# Patient Record
Sex: FEMALE | Race: WHITE | NOT HISPANIC OR LATINO | Employment: PART TIME | URBAN - METROPOLITAN AREA
[De-identification: names, ages, dates, MRNs, and addresses within clinical notes are randomized per-mention and may not be internally consistent; named-entity substitution may affect disease eponyms.]

---

## 2018-01-18 NOTE — PROGRESS NOTES
Assessment    1  Fracture, tooth (323 91) (S02  5XXA)    Plan  Fracture, tooth    · Amoxicillin 875 MG Oral Tablet; TAKE 1 TABLET EVERY 12 HOURS DAILY   · Oral Surgery Follow Up Evaluation and Treatment  Follow-up  Status: Hold For -  Scheduling  Requested for: 09Apr2016    Discussion/Summary  Discussion Summary:   Patient has broken crown leading to overgrowth of the gum tissue in the teeth which is also likely a source of bleeding  I do not see any evidence of tuan infection at this point  Advised to continue anti-inflammatory for pain control  May use benzocaine 20%(Oraljel Max strength) locally with Q-tip a few times a day as needed for pain control  Watch for worsening of the symptoms and if worsening swelling or pus discharge, start antibiotic but strongly advised to follow up with dentist as soon as possible  Medication Side Effects Reviewed: Possible side effects of new medications were reviewed with the patient/guardian today  Understands and agrees with treatment plan: The treatment plan was reviewed with the patient/guardian  The patient/guardian understands and agrees with the treatment plan      Chief Complaint    1  Dental Pain  Chief Complaint Free Text Note Form: Pt with broken left upper molar  Pain at tooth and swelling of gum for 5 days  Taking tylenol      History of Present Illness  Hospital Based Practices Required Assessment:   Pain Assessment   the patient states they have pain  The pain is located in the left upper molar  The patient describes the pain as throbbing  (on a scale of 0 to 10, the patient rates the pain at 6 )   Abuse And Domestic Violence Screen    Yes, the patient is safe at home  The patient states no one is hurting them  Depression And Suicide Screen  No, the patient has not had thoughts of hurting themself  No, the patient has not felt depressed in the past 7 days  Dental Pain: Christie Alston presents with complaints of dental pain     Associated symptoms include dental pain in a single tooth, inflamed gums and temperature sensitivity, but no dental pain in multiple teeth, no bleeding gums, no purulent discharge, no buccal mucosal irritation, no facial swelling, no fever, no headache, no sore throat, no swollen glands, no ear pain and no sinus pain  Review of Systems  Focused-Female:   Constitutional: No fever, no chills, feels well, no tiredness, no recent weight gain or loss  Active Problems    1  Abnormal menses (626 9) (N92 6)   2  Acne (706 1) (L70 9)   3  Carpal tunnel syndrome (354 0) (G56 00)   4  Irregular menstrual cycle (626 4) (N92 6)   5  Need for prophylactic vaccination and inoculation against influenza (V04 81) (Z23)   6  PCOS (polycystic ovarian syndrome) (256 4) (E28 2)   7  Screening for lipid disorders (V77 91) (Z13 220)   8  Screening for thyroid disorder (V77 0) (Z13 29)   9  Sore throat (462) (J02 9)   10  Strep pharyngitis (034 0) (J02 0)   11  Weight gain (783 1) (R63 5)   12  Well woman exam with routine gynecological exam (V72 31) (Z01 419)    Past Medical History    1  History of Blood pressure elevated (401 9) (I10)    Family History    1  Family history of Heart disease    2  Family history of Diabetes   3  Family history of Stroke    Social History    · Never smoker   · No alcohol use   · No drug use    Surgical History    1  History of  Section    Current Meds   1  Meloxicam 15 MG Oral Tablet; Therapy: (Recorded:2016) to Recorded   2  MetFORMIN HCl - 500 MG Oral Tablet; Therapy: (Recorded:2016) to Recorded    Allergies    1  No Known Drug Allergies    2  No Known Environmental Allergies   3   No Known Food Allergies    Vitals  Signs [Data Includes: Current Encounter]   Recorded: 2016 01:57PM   Temperature: 98 2 F  Heart Rate: 84  Respiration: 20  Systolic: 229  Diastolic: 80  O2 Saturation: 98  Pain Scale: 6    Physical Exam    Constitutional   General appearance: No acute distress, well appearing and well nourished  Ears, Nose, Mouth, and Throat Broken crown of the second left upper molar with surrounding gum tissue going in the teeth itself  Mild localized contained redness and swelling of the gumline noticed  No buccal mucosal irritation  No swelling of the oral cavity  No tenderness of the maxilla        Signatures   Electronically signed by : KRYSTAL Jernigan ; Apr 10 2016  1:30PM EST                       (Author)

## 2018-07-16 DIAGNOSIS — G43.909 MIGRAINE WITHOUT STATUS MIGRAINOSUS, NOT INTRACTABLE, UNSPECIFIED MIGRAINE TYPE: Primary | ICD-10-CM

## 2018-07-16 RX ORDER — TOPIRAMATE 50 MG/1
TABLET, FILM COATED ORAL
Qty: 30 TABLET | Refills: 3 | Status: SHIPPED | OUTPATIENT
Start: 2018-07-16 | End: 2018-10-30 | Stop reason: SDUPTHER

## 2018-10-30 DIAGNOSIS — G43.909 MIGRAINE WITHOUT STATUS MIGRAINOSUS, NOT INTRACTABLE, UNSPECIFIED MIGRAINE TYPE: ICD-10-CM

## 2018-10-30 RX ORDER — TOPIRAMATE 50 MG/1
TABLET, FILM COATED ORAL
Qty: 30 TABLET | Refills: 3 | Status: SHIPPED | OUTPATIENT
Start: 2018-10-30 | End: 2019-02-13 | Stop reason: SDUPTHER

## 2019-01-03 ENCOUNTER — OFFICE VISIT (OUTPATIENT)
Dept: URGENT CARE | Age: 29
End: 2019-01-03
Payer: COMMERCIAL

## 2019-01-03 VITALS
RESPIRATION RATE: 16 BRPM | BODY MASS INDEX: 37.73 KG/M2 | HEART RATE: 80 BPM | WEIGHT: 205 LBS | OXYGEN SATURATION: 98 % | HEIGHT: 62 IN | DIASTOLIC BLOOD PRESSURE: 65 MMHG | SYSTOLIC BLOOD PRESSURE: 102 MMHG | TEMPERATURE: 98 F

## 2019-01-03 DIAGNOSIS — H60.501 ACUTE OTITIS EXTERNA OF RIGHT EAR, UNSPECIFIED TYPE: Primary | ICD-10-CM

## 2019-01-03 PROCEDURE — 99283 EMERGENCY DEPT VISIT LOW MDM: CPT | Performed by: PHYSICIAN ASSISTANT

## 2019-01-03 PROCEDURE — 99203 OFFICE O/P NEW LOW 30 MIN: CPT | Performed by: PHYSICIAN ASSISTANT

## 2019-01-03 PROCEDURE — G0382 LEV 3 HOSP TYPE B ED VISIT: HCPCS | Performed by: PHYSICIAN ASSISTANT

## 2019-01-03 RX ORDER — OFLOXACIN 3 MG/ML
5 SOLUTION AURICULAR (OTIC) DAILY
Qty: 5 ML | Refills: 0 | Status: SHIPPED | OUTPATIENT
Start: 2019-01-03 | End: 2019-01-10

## 2019-01-03 NOTE — PROGRESS NOTES
[facility]        NAME: Ashley Zacarias is a 25 y o  female  : 1990    MRN: [id number]  DATE: January 3, 2019  TIME: 1:46 PM    Assessment and Plan   Acute otitis externa of right ear, unspecified type [H60 501]  1  Acute otitis externa of right ear, unspecified type  ofloxacin (FLOXIN) 0 3 % otic solution         Patient Instructions     Use Ofloxacin drops as prescribed  Avoid Q-Tip use  Tylenol/Ibuprofen for discomfort  Fluids  Change pillowcase daily  Follow up with PCP in 3-5 days  Proceed to  ER if symptoms worsen  Chief Complaint     Chief Complaint   Patient presents with    Earache     Pt c/o right ear pain x 2 days  History of Present Illness       Patient is a chronic Q-tip user  Earache    There is pain in the right ear  This is a new problem  Episode onset: 3 days  The problem occurs constantly  The problem has been gradually worsening  There has been no fever  The pain is at a severity of 6/10  The pain is moderate  Pertinent negatives include no abdominal pain, coughing, diarrhea, ear discharge, hearing loss, rash, rhinorrhea, sore throat or vomiting  She has tried NSAIDs for the symptoms  The treatment provided mild relief  Review of Systems   Review of Systems   Constitutional: Negative for appetite change, chills and fever  HENT: Positive for ear pain  Negative for ear discharge, hearing loss, postnasal drip, rhinorrhea, sinus pain, sinus pressure, sore throat, tinnitus and trouble swallowing  Respiratory: Negative for cough and shortness of breath  Cardiovascular: Negative for chest pain and palpitations  Gastrointestinal: Negative for abdominal pain, constipation, diarrhea, nausea and vomiting  Musculoskeletal: Negative for myalgias  Skin: Negative for rash  Neurological: Positive for dizziness (resolved)  Negative for light-headedness           Current Medications       Current Outpatient Prescriptions:     ofloxacin (FLOXIN) 0 3 % otic solution, Administer 5 drops to the right ear daily for 7 days, Disp: 5 mL, Rfl: 0    topiramate (TOPAMAX) 50 MG tablet, TAKE 1 TABLET BY ORAL ROUTE EVERY DAY (Patient not taking: Reported on 1/3/2019), Disp: 30 tablet, Rfl: 3    Current Allergies     Allergies as of 2019    (No Known Allergies)            The following portions of the patient's history were reviewed and updated as appropriate: allergies, current medications, past family history, past medical history, past social history, past surgical history and problem list      Past Medical History:   Diagnosis Date    PCOS (polycystic ovarian syndrome)        Past Surgical History:   Procedure Laterality Date     SECTION      x1       Family History   Problem Relation Age of Onset    Breast cancer Family     Lung cancer Family          Medications have been verified  Objective   /65   Pulse 80   Temp 98 °F (36 7 °C) (Tympanic)   Resp 16   Ht 5' 2" (1 575 m)   Wt 93 kg (205 lb)   LMP 2018   SpO2 98%   BMI 37 49 kg/m²        Physical Exam     Physical Exam   Constitutional: She appears well-developed and well-nourished  No distress  HENT:   Head: Normocephalic and atraumatic  Left Ear: External ear normal    Nose: Nose normal    Mouth/Throat: Oropharynx is clear and moist  No oropharyngeal exudate  R tragus TTP  L Auricle, tragus, and pina without tenderness to palpation  R canals with erythema and edema but without discharge  B/L TM without erythema, bulging, perforation  Eyes: EOM are normal    Cardiovascular: Normal rate, regular rhythm and normal heart sounds  Exam reveals no gallop and no friction rub  No murmur heard  Pulmonary/Chest: Effort normal and breath sounds normal  No respiratory distress  She has no wheezes  She has no rales  She exhibits no tenderness  Lymphadenopathy:     She has no cervical adenopathy  Neurological: She is alert  Skin: Skin is warm   She is not diaphoretic  Psychiatric: She has a normal mood and affect  Her behavior is normal  Judgment and thought content normal    Vitals reviewed

## 2019-02-13 DIAGNOSIS — G43.909 MIGRAINE WITHOUT STATUS MIGRAINOSUS, NOT INTRACTABLE, UNSPECIFIED MIGRAINE TYPE: ICD-10-CM

## 2019-02-13 RX ORDER — TOPIRAMATE 50 MG/1
TABLET, FILM COATED ORAL
Qty: 30 TABLET | Refills: 3 | Status: SHIPPED | OUTPATIENT
Start: 2019-02-13 | End: 2019-05-29 | Stop reason: SDUPTHER

## 2019-05-29 DIAGNOSIS — G43.909 MIGRAINE WITHOUT STATUS MIGRAINOSUS, NOT INTRACTABLE, UNSPECIFIED MIGRAINE TYPE: ICD-10-CM

## 2019-05-30 RX ORDER — TOPIRAMATE 50 MG/1
TABLET, FILM COATED ORAL
Qty: 30 TABLET | Refills: 3 | Status: SHIPPED | OUTPATIENT
Start: 2019-05-30 | End: 2019-08-06

## 2019-07-08 ENCOUNTER — OFFICE VISIT (OUTPATIENT)
Dept: FAMILY MEDICINE CLINIC | Facility: CLINIC | Age: 29
End: 2019-07-08
Payer: COMMERCIAL

## 2019-07-08 VITALS
TEMPERATURE: 98.1 F | RESPIRATION RATE: 16 BRPM | HEART RATE: 80 BPM | SYSTOLIC BLOOD PRESSURE: 110 MMHG | OXYGEN SATURATION: 99 % | HEIGHT: 62 IN | DIASTOLIC BLOOD PRESSURE: 70 MMHG | BODY MASS INDEX: 37.73 KG/M2 | WEIGHT: 205 LBS

## 2019-07-08 DIAGNOSIS — Z23 IMMUNIZATION DUE: ICD-10-CM

## 2019-07-08 DIAGNOSIS — L30.1 ECZEMA, DYSHIDROTIC: ICD-10-CM

## 2019-07-08 PROCEDURE — 3725F SCREEN DEPRESSION PERFORMED: CPT | Performed by: FAMILY MEDICINE

## 2019-07-08 PROCEDURE — 99213 OFFICE O/P EST LOW 20 MIN: CPT | Performed by: FAMILY MEDICINE

## 2019-07-08 PROCEDURE — 3008F BODY MASS INDEX DOCD: CPT | Performed by: FAMILY MEDICINE

## 2019-07-08 RX ORDER — TRIAMCINOLONE ACETONIDE 5 MG/G
CREAM TOPICAL 3 TIMES DAILY
Qty: 30 G | Refills: 2 | Status: SHIPPED | OUTPATIENT
Start: 2019-07-08 | End: 2019-08-06

## 2019-07-08 NOTE — PROGRESS NOTES
Assessment/Plan:      Diagnoses and all orders for this visit:    Eczema, dyshidrotic  Comments:  Was given prescription for triamcinolone cream twice a day  Apply moisturizer       Orders:  -     triamcinolone (KENALOG) 0 5 % cream; Apply topically 3 (three) times a day    Immunization due  -     TDAP VACCINE GREATER THAN OR EQUAL TO 8YO IM    BMI 37 0-37 9, adult          Subjective:     Patient ID: Bryan Chavez is a 30 y o  female  Pt has blisters on the bottom of her right foot that have been recurring over the past year  She wears sneakers for work everyday and is on her feet outside  She was previously told it was athlete's foot, but the prescribed cream did not help  It drains a clear liquid  No puss or drainage  Nonpainful  It itches to a point that sometimes wakes her up in the middle of the night  Doesn't bother her during the day  She reports that at one point the rash "spread" and she had three grouped blisters on her finger  She has tried eczema cream which helped minimally with the itch, but did not make the rash go away  Review of Systems   Constitutional: Negative for chills and fever  HENT: Negative for trouble swallowing  Eyes: Negative for visual disturbance  Respiratory: Negative for cough and shortness of breath  Cardiovascular: Negative for chest pain, palpitations and leg swelling  Gastrointestinal: Negative for abdominal pain, constipation and diarrhea  Endocrine: Negative for cold intolerance and heat intolerance  Genitourinary: Negative for difficulty urinating and dysuria  Musculoskeletal: Negative for gait problem  Skin: Negative for rash  Neurological: Negative for dizziness, tremors, seizures and headaches  Hematological: Negative for adenopathy  Psychiatric/Behavioral: Negative for behavioral problems  Objective:     Physical Exam   Constitutional: She appears well-developed and well-nourished     Feet:   Right Foot:   Skin Integrity: Positive for blister and dry skin  Negative for skin breakdown, erythema, warmth or callus  Skin:   Bottom of right foot reveals about 12 spots of peeled skin that resemble popped blisters  No drainage, erythema, or ecchymosis  No thickening of skin around the region  BMI Counseling: Body mass index is 37 49 kg/m²  Discussed the patient's BMI with her  The BMI is above average  BMI counseling and education was provided to the patient  Nutrition recommendations include reducing portion sizes, decreasing overall calorie intake and 3-5 servings of fruits/vegetables daily  Exercise recommendations include moderate aerobic physical activity for 150 minutes/week

## 2019-07-08 NOTE — PATIENT INSTRUCTIONS
Obesity   AMBULATORY CARE:   Obesity  is when your body mass index (BMI) is greater than 30  Your healthcare provider will use your height and weight to measure your BMI  The risks of obesity include  many health problems, such as injuries or physical disability  You may need tests to check for the following:  · Diabetes     · High blood pressure or high cholesterol     · Heart disease     · Gallbladder or liver disease     · Cancer of the colon, breast, prostate, liver, or kidney     · Sleep apnea     · Arthritis or gout  Seek care immediately if:   · You have a severe headache, confusion, or difficulty speaking  · You have weakness on one side of your body  · You have chest pain, sweating, or shortness of breath  Contact your healthcare provider if:   · You have symptoms of gallbladder or liver disease, such as pain in your upper abdomen  · You have knee or hip pain and discomfort while walking  · You have symptoms of diabetes, such as intense hunger and thirst, and frequent urination  · You have symptoms of sleep apnea, such as snoring or daytime sleepiness  · You have questions or concerns about your condition or care  Treatment for obesity  focuses on helping you lose weight to improve your health  Even a small decrease in BMI can reduce the risk for many health problems  Your healthcare provider will help you set a weight-loss goal   · Lifestyle changes  are the first step in treating obesity  These include making healthy food choices and getting regular physical activity  Your healthcare provider may suggest a weight-loss program that involves coaching, education, and therapy  · Medicine  may help you lose weight when it is used with a healthy diet and physical activity  · Surgery  can help you lose weight if you are very obese and have other health problems  There are several types of weight-loss surgery  Ask your healthcare provider for more information    Be successful losing weight:   · Set small, realistic goals  An example of a small goal is to walk for 20 minutes 5 days a week  Anther goal is to lose 5% of your body weight  · Tell friends, family members, and coworkers about your goals  and ask for their support  Ask a friend to lose weight with you, or join a weight-loss support group  · Identify foods or triggers that may cause you to overeat , and find ways to avoid them  Remove tempting high-calorie foods from your home and workplace  Place a bowl of fresh fruit on your kitchen counter  If stress causes you to eat, then find other ways to cope with stress  · Keep a diary to track what you eat and drink  Also write down how many minutes of physical activity you do each day  Weigh yourself once a week and record it in your diary  Eating changes: You will need to eat 500 to 1,000 fewer calories each day than you currently eat to lose 1 to 2 pounds a week  The following changes will help you cut calories:  · Eat smaller portions  Use small plates, no larger than 9 inches in diameter  Fill your plate half full of fruits and vegetables  Measure your food using measuring cups until you know what a serving size looks like  · Eat 3 meals and 1 or 2 snacks each day  Plan your meals in advance  [facility] and eat at home most of the time  Eat slowly  · Eat fruits and vegetables at every meal   They are low in calories and high in fiber, which makes you feel full  Do not add butter, margarine, or cream sauce to vegetables  Use herbs to season steamed vegetables  · Eat less fat and fewer fried foods  Eat more baked or grilled chicken and fish  These protein sources are lower in calories and fat than red meat  Limit fast food  Dress your salads with olive oil and vinegar instead of bottled dressing  · Limit the amount of sugar you eat  Do not drink sugary beverages  Limit alcohol  Activity changes:  Physical activity is good for your body in many ways   It helps you burn calories and build strong muscles  It decreases stress and depression, and improves your mood  It can also help you sleep better  Talk to your healthcare provider before you begin an exercise program   · Exercise for at least 30 minutes 5 days a week  Start slowly  Set aside time each day for physical activity that you enjoy and that is convenient for you  It is best to do both weight training and an activity that increases your heart rate, such as walking, bicycling, or swimming  · Find ways to be more active  Do yard work and housecleaning  Walk up the stairs instead of using elevators  Spend your leisure time going to events that require walking, such as outdoor festivals or fairs  This extra physical activity can help you lose weight and keep it off  Follow up with your healthcare provider as directed: You may need to meet with a dietitian  Write down your questions so you remember to ask them during your visits  © 2017 [employer] Information is for End User's use only and may not be sold, redistributed or otherwise used for commercial purposes  All illustrations and images included in CareNotes® are the copyrighted property of A [employer] M [employer] Inc  or [employer]  The above information is an  only  It is not intended as medical advice for individual conditions or treatments  Talk to your doctor, nurse or pharmacist before following any medical regimen to see if it is safe and effective for you

## 2019-07-30 ENCOUNTER — CLINICAL SUPPORT (OUTPATIENT)
Dept: OBGYN CLINIC | Facility: CLINIC | Age: 29
End: 2019-07-30
Payer: COMMERCIAL

## 2019-07-30 VITALS — BODY MASS INDEX: 41.19 KG/M2 | WEIGHT: 225.2 LBS

## 2019-07-30 DIAGNOSIS — N92.6 MISSED MENSES: Primary | ICD-10-CM

## 2019-07-30 LAB — SL AMB POCT URINE HCG: POSITIVE

## 2019-07-30 PROCEDURE — 81025 URINE PREGNANCY TEST: CPT | Performed by: OBSTETRICS & GYNECOLOGY

## 2019-07-30 NOTE — PROGRESS NOTES
Patient presents in office for walk in pregnancy test  Patient reports that her LMP was 6-9-2019 and she has taken 4 pregnancy tests at home which all were positive for pregnancy  Patient provided urine sample and HCG was performed in office  Test was positive giving a MCKAYLA of 3-  Patient will schedule OB intake within the next few weeks

## 2019-08-06 ENCOUNTER — INITIAL PRENATAL (OUTPATIENT)
Dept: OBGYN CLINIC | Facility: CLINIC | Age: 29
End: 2019-08-06
Payer: COMMERCIAL

## 2019-08-06 ENCOUNTER — ULTRASOUND (OUTPATIENT)
Dept: OBGYN CLINIC | Facility: CLINIC | Age: 29
End: 2019-08-06
Payer: COMMERCIAL

## 2019-08-06 VITALS
BODY MASS INDEX: 41.26 KG/M2 | DIASTOLIC BLOOD PRESSURE: 64 MMHG | WEIGHT: 224.2 LBS | HEIGHT: 62 IN | SYSTOLIC BLOOD PRESSURE: 114 MMHG

## 2019-08-06 DIAGNOSIS — O36.80X1 ENCOUNTER TO DETERMINE FETAL VIABILITY OF PREGNANCY, FETUS 1: Primary | ICD-10-CM

## 2019-08-06 DIAGNOSIS — Z34.81 SUPERVISION OF NORMAL INTRAUTERINE PREGNANCY IN MULTIGRAVIDA, FIRST TRIMESTER: Primary | ICD-10-CM

## 2019-08-06 DIAGNOSIS — Z98.891 HISTORY OF CESAREAN SECTION: ICD-10-CM

## 2019-08-06 DIAGNOSIS — Z36.9 ANTENATAL SCREENING ENCOUNTER: ICD-10-CM

## 2019-08-06 DIAGNOSIS — O09.891 HISTORY OF PRETERM DELIVERY, CURRENTLY PREGNANT IN FIRST TRIMESTER: ICD-10-CM

## 2019-08-06 DIAGNOSIS — E55.9 VITAMIN D DEFICIENCY: ICD-10-CM

## 2019-08-06 PROCEDURE — NOBC: Performed by: NURSE PRACTITIONER

## 2019-08-06 PROCEDURE — 76801 OB US < 14 WKS SINGLE FETUS: CPT | Performed by: OBSTETRICS & GYNECOLOGY

## 2019-08-06 RX ORDER — FLUTICASONE PROPIONATE 50 MCG
2 SPRAY, SUSPENSION (ML) NASAL EVERY 24 HOURS
COMMUNITY
Start: 2018-04-05 | End: 2019-08-06

## 2019-08-06 RX ORDER — ERGOCALCIFEROL (VITAMIN D2) 1250 MCG
1 CAPSULE ORAL
COMMUNITY
Start: 2018-04-24 | End: 2019-08-06

## 2019-08-06 RX ORDER — PHENTERMINE HYDROCHLORIDE 30 MG/1
1 CAPSULE ORAL EVERY 24 HOURS
COMMUNITY
Start: 2018-04-05 | End: 2019-08-06

## 2019-08-06 NOTE — PROGRESS NOTES
IU Sac=5+wks  CRL=2 9mm=5w6d    Repeat for viability in 2 wks    UT=95 x 68 x 77 mm  RT OV=34 x 33 x 25 mm  LT OV=35 x 30 x 29 mm

## 2019-08-06 NOTE — PROGRESS NOTES
Kylah Lawrence is a 30year old  3 para 3 pregnant female who presents for her obstetrical intake  Pregnancy unplanned but desired  Hx of PCOS  Complicated by obesity, h/o PTD at 34w5d, h/o   FOB/  "Edita Rich" is involved  Obstetrical history:  2006  38w, 8lbs 7oz, female, uncomplicated  6402  38w, 8lbs 1 oz, male, uncomplicated    delivery, 34w3d, 6lbs 3oz, male, emergency  due to cord prolapse    Personal history:  PCOS w/ assocd infertility, obesity     Surgical history:   delivery (3rd pregnancy)    Medications  None    Allergies:  NKDA    Family history:  Diabetes - mgm, mgf  Heart disease- mother  HIV- mother  Kidney disease- mother    Substance use:  None    Genetic history:  Unremarkable  Patient-; FOB-     Infection history:  Unremarkable  + varicella    ASSESSMENT / PLAN:    1  Supervision of normal intrauterine pregnancy in multigravida, first trimester    Unplanned but desired pregnancy, ~ 8w gestation based upon LMP of ; cycles were irregular due to hx of PCOS, therefore dating US to be done today  Agrees to genetic screening  Prenatal lab orders placed  Advised to start prenatal vitamin  Reviewed OB packet in detail  *addendum:  Dating US today measuring at 2w less than dates by LMP  Rpt US in 2 wks  - Obstetric panel; Future  - HIV 1/2 AG-AB combo; Future  - Urine culture  - Vitamin D 25 hydroxy; Future  - Glucose, 1H PG; Future  - Ambulatory Referral to Maternal Fetal Medicine; Future  - Hemoglobin Electrophoresis; Future  - Cystic fibrosis gene test; Future    2  Vitamin D deficiency  If vit D low, will supplement accordingly    - Vitamin D 25 hydroxy; Future    3   screening encounter  Genetic screening    - Ambulatory Referral to Maternal Fetal Medicine; Future  - Hemoglobin Electrophoresis; Future  - Cystic fibrosis gene test; Future    4   History of  delivery, currently pregnant in first trimester  Consult with MFM    - Ambulatory Referral to Maternal Fetal Medicine; Future    5  BMI 40 0-44 9, adult (HCC)    - Glucose, 1H PG; Future    Discomforts and complaints were addressed and her questions were answered  Return visit ~12w gestation for initial OB exam; needs pap update & gc/chlam screening

## 2019-09-12 DIAGNOSIS — G43.909 MIGRAINE WITHOUT STATUS MIGRAINOSUS, NOT INTRACTABLE, UNSPECIFIED MIGRAINE TYPE: ICD-10-CM

## 2019-09-13 DIAGNOSIS — G43.909 MIGRAINE WITHOUT STATUS MIGRAINOSUS, NOT INTRACTABLE, UNSPECIFIED MIGRAINE TYPE: Primary | ICD-10-CM

## 2019-09-14 RX ORDER — TOPIRAMATE 50 MG/1
50 TABLET, FILM COATED ORAL DAILY
Qty: 30 TABLET | Refills: 0 | Status: SHIPPED | OUTPATIENT
Start: 2019-09-14 | End: 2020-05-05

## 2019-09-14 RX ORDER — TOPIRAMATE 50 MG/1
TABLET, FILM COATED ORAL
Qty: 30 TABLET | Refills: 3 | Status: SHIPPED | OUTPATIENT
Start: 2019-09-14 | End: 2020-05-05

## 2020-01-07 ENCOUNTER — TELEPHONE (OUTPATIENT)
Dept: OBGYN CLINIC | Facility: CLINIC | Age: 30
End: 2020-01-07

## 2020-01-07 ENCOUNTER — CLINICAL SUPPORT (OUTPATIENT)
Dept: OBGYN CLINIC | Facility: CLINIC | Age: 30
End: 2020-01-07
Payer: COMMERCIAL

## 2020-01-07 VITALS — BODY MASS INDEX: 41.01 KG/M2 | HEIGHT: 62 IN

## 2020-01-07 DIAGNOSIS — N94.89 SUPPRESSION OF MENSES: Primary | ICD-10-CM

## 2020-01-07 LAB — SL AMB POCT URINE HCG: POSITIVE

## 2020-01-07 PROCEDURE — 81025 URINE PREGNANCY TEST: CPT | Performed by: OBSTETRICS & GYNECOLOGY

## 2020-01-07 NOTE — TELEPHONE ENCOUNTER
Positive HCG urine test in office today  Patient had miscarriage in August 2019, she had spotting 09/23/2019, unsure if it was her period  Based on information provided, patient is approximately 15w1d pregnant with an MCKAYLA of 06/29/2020  An initial OB Intake was scheduled before leaving the office

## 2020-01-07 NOTE — PROGRESS NOTES
Patient presents to office for walk-in hcg urine test  In office test was positive  Based on patients LMP of 09/23/2019 - she was spotting (miscarriage in August), she is currently 15w1d pregnant with an MCKAYLA of 06/29/2020  All information was given to patient and initial OB intake was scheduled before leaving the office

## 2020-01-15 ENCOUNTER — INITIAL PRENATAL (OUTPATIENT)
Dept: OBGYN CLINIC | Facility: CLINIC | Age: 30
End: 2020-01-15

## 2020-01-15 VITALS
HEART RATE: 108 BPM | OXYGEN SATURATION: 98 % | WEIGHT: 240.8 LBS | SYSTOLIC BLOOD PRESSURE: 122 MMHG | HEIGHT: 62 IN | BODY MASS INDEX: 44.31 KG/M2 | DIASTOLIC BLOOD PRESSURE: 78 MMHG

## 2020-01-15 DIAGNOSIS — O09.892 HISTORY OF PRETERM DELIVERY, CURRENTLY PREGNANT IN SECOND TRIMESTER: ICD-10-CM

## 2020-01-15 DIAGNOSIS — Z11.51 SCREENING FOR HPV (HUMAN PAPILLOMAVIRUS): ICD-10-CM

## 2020-01-15 DIAGNOSIS — Z12.4 ENCOUNTER FOR PAPANICOLAOU SMEAR FOR CERVICAL CANCER SCREENING: ICD-10-CM

## 2020-01-15 DIAGNOSIS — O99.212 OBESITY AFFECTING PREGNANCY IN SECOND TRIMESTER: Primary | ICD-10-CM

## 2020-01-15 DIAGNOSIS — Z34.82 MULTIGRAVIDA IN SECOND TRIMESTER: ICD-10-CM

## 2020-01-15 DIAGNOSIS — Z98.891 HISTORY OF CESAREAN SECTION: ICD-10-CM

## 2020-01-15 PROBLEM — O09.212 SUPERVISION OF PREGNANCY WITH HISTORY OF PRE-TERM LABOR IN SECOND TRIMESTER: Status: ACTIVE | Noted: 2019-08-06

## 2020-01-15 PROBLEM — Z34.81 SUPERVISION OF NORMAL INTRAUTERINE PREGNANCY IN MULTIGRAVIDA, FIRST TRIMESTER: Status: RESOLVED | Noted: 2019-08-06 | Resolved: 2020-01-15

## 2020-01-15 LAB — EXTERNAL CHLAMYDIA SCREEN: NEGATIVE

## 2020-01-15 PROCEDURE — OBC: Performed by: NURSE PRACTITIONER

## 2020-01-15 PROCEDURE — 87591 N.GONORRHOEAE DNA AMP PROB: CPT | Performed by: NURSE PRACTITIONER

## 2020-01-15 PROCEDURE — G0145 SCR C/V CYTO,THINLAYER,RESCR: HCPCS | Performed by: NURSE PRACTITIONER

## 2020-01-15 PROCEDURE — 87624 HPV HI-RISK TYP POOLED RSLT: CPT | Performed by: NURSE PRACTITIONER

## 2020-01-15 PROCEDURE — 87491 CHLMYD TRACH DNA AMP PROBE: CPT | Performed by: NURSE PRACTITIONER

## 2020-01-15 NOTE — PROGRESS NOTES
Morgan Manning is a 30year old  11 para 3 pregnant female who presents for her obstetrical intake  She is approximately 16 week gestation based on an LMP of 19, although it was light and short and patient has h/o very irregular menses due to PCOS  Pregnancy was a surprise but is very much desired  Obstetrical history:  2006  38w female 8lbs 1 oz, uncomplicated    38w male 8 lbs 1 oz, uncomplicated   / emergent, 34w, prolapsed cord, male 6 lbs 3 oz   2019 1st trimester miscarriage    Personal history:  Obesity, BMI 44  Hx PCOS/infertility    Surgical history:      Medications  Prenatal vitamin    Allergies:  NKDA    Family history:  Asthma  Breast cancer  Diabetes  Heart disease  Kidney disease  Prostate cancer    Substance use:  None    Genetic history:  Unremarkable  Patient- 100% , [place]  FOB- 100%     Infection history:  Unremarkable  + varicella      ASSESSMENT / PLAN:    1  Obesity affecting pregnancy in second trimester  31 yo  at ~16 wks gestation, complicated by obesity, hx of  delivery at 34 wks, hx of   Will verify dating via ultrasound in 2 days  - Chlamydia/GC amplified DNA by PCR  - Obstetric panel; Future  - HIV 1/2 AG-AB combo; Future  - Urine culture  - Glucose, 1H PG; Future  - Hemoglobin Electrophoresis; Future  - Ambulatory Referral to Maternal Fetal Medicine; Future    2  History of  delivery, currently pregnant in second trimester  Referral to [facility]    - Ambulatory Referral to Maternal Fetal Medicine; Future    3  Multigravida in second trimester      4  History of  section      31 yo  at 10M gestation, complicated by above  Discussed and reviewed initial prenatal lab work and lab order was provided to patient  Reinforced daily administration of prenatal vitamin  Discussed role of aneuploidy screening with Quad screen in her pregnancy which patient declines      Essentials of Pregnancy book and related materials were reviewed in detail  Flu vaccine was recommended  Discomforts and complaints were addressed and her questions were answered  Pap, GC & chlamydia obtained today  RV 2 days for dating US  Return visit 4 weeks for PN visit

## 2020-01-16 LAB
HPV HR 12 DNA CVX QL NAA+PROBE: NEGATIVE
HPV16 DNA CVX QL NAA+PROBE: NEGATIVE
HPV18 DNA CVX QL NAA+PROBE: NEGATIVE

## 2020-01-17 ENCOUNTER — ULTRASOUND (OUTPATIENT)
Dept: OBGYN CLINIC | Facility: CLINIC | Age: 30
End: 2020-01-17
Payer: COMMERCIAL

## 2020-01-17 ENCOUNTER — TELEPHONE (OUTPATIENT)
Dept: OBGYN CLINIC | Facility: CLINIC | Age: 30
End: 2020-01-17

## 2020-01-17 DIAGNOSIS — Z34.91 CURRENTLY PREGNANT IN FIRST TRIMESTER WITH UNKNOWN DATE OF LAST MENSTRUAL PERIOD: ICD-10-CM

## 2020-01-17 DIAGNOSIS — O36.80X0 PREGNANCY WITH UNCERTAIN FETAL VIABILITY, SINGLE OR UNSPECIFIED FETUS: Primary | ICD-10-CM

## 2020-01-17 LAB
C TRACH DNA SPEC QL NAA+PROBE: NEGATIVE
N GONORRHOEA DNA SPEC QL NAA+PROBE: NEGATIVE

## 2020-01-17 PROCEDURE — 76801 OB US < 14 WKS SINGLE FETUS: CPT | Performed by: OBSTETRICS & GYNECOLOGY

## 2020-01-17 NOTE — PROGRESS NOTES
SV AOA=680 BPM @? 16w4d  CRL=48 8mm=11w5d    EDC=8/2/2020    UT=146 x 72 x 96 mm  RT OV=35 x 29 x 24 mm  LT OV=36 x 30 x 27 mm

## 2020-01-17 NOTE — TELEPHONE ENCOUNTER
Patient needs new referral to [facility] as her ultrasound showed her to be less than 16 wks as originally thought

## 2020-01-20 ENCOUNTER — OFFICE VISIT (OUTPATIENT)
Dept: URGENT CARE | Age: 30
End: 2020-01-20
Payer: COMMERCIAL

## 2020-01-20 ENCOUNTER — OB ABSTRACT (OUTPATIENT)
Dept: OBGYN CLINIC | Facility: CLINIC | Age: 30
End: 2020-01-20

## 2020-01-20 VITALS
DIASTOLIC BLOOD PRESSURE: 74 MMHG | HEIGHT: 61 IN | WEIGHT: 239 LBS | HEART RATE: 108 BPM | SYSTOLIC BLOOD PRESSURE: 129 MMHG | OXYGEN SATURATION: 99 % | BODY MASS INDEX: 45.12 KG/M2 | RESPIRATION RATE: 18 BRPM | TEMPERATURE: 100.2 F

## 2020-01-20 DIAGNOSIS — J11.1 INFLUENZA: Primary | ICD-10-CM

## 2020-01-20 DIAGNOSIS — O99.211 OBESITY COMPLICATING PREGNANCY, FIRST TRIMESTER: Primary | ICD-10-CM

## 2020-01-20 PROBLEM — O09.211 SUPERVISION OF PREGNANCY WITH HISTORY OF PRE-TERM LABOR IN FIRST TRIMESTER: Status: ACTIVE | Noted: 2019-08-06

## 2020-01-20 LAB
LAB AP GYN PRIMARY INTERPRETATION: NORMAL
Lab: NORMAL
S PYO AG THROAT QL: NEGATIVE
SL AMB  POCT GLUCOSE, UA: ABNORMAL
SL AMB LEUKOCYTE ESTERASE,UA: NEGATIVE
SL AMB POCT BILIRUBIN,UA: ABNORMAL
SL AMB POCT BLOOD,UA: NEGATIVE
SL AMB POCT CLARITY,UA: ABNORMAL
SL AMB POCT COLOR,UA: YELLOW
SL AMB POCT KETONES,UA: 5
SL AMB POCT NITRITE,UA: NEGATIVE
SL AMB POCT PH,UA: 7.5
SL AMB POCT SPECIFIC GRAVITY,UA: 1.01
SL AMB POCT URINE PROTEIN: ABNORMAL
SL AMB POCT UROBILINOGEN: 1

## 2020-01-20 PROCEDURE — G0382 LEV 3 HOSP TYPE B ED VISIT: HCPCS | Performed by: PHYSICIAN ASSISTANT

## 2020-01-20 PROCEDURE — 87631 RESP VIRUS 3-5 TARGETS: CPT | Performed by: PHYSICIAN ASSISTANT

## 2020-01-20 PROCEDURE — 87086 URINE CULTURE/COLONY COUNT: CPT | Performed by: PHYSICIAN ASSISTANT

## 2020-01-20 PROCEDURE — 87070 CULTURE OTHR SPECIMN AEROBIC: CPT | Performed by: PHYSICIAN ASSISTANT

## 2020-01-20 PROCEDURE — 81002 URINALYSIS NONAUTO W/O SCOPE: CPT | Performed by: PHYSICIAN ASSISTANT

## 2020-01-20 PROCEDURE — 87880 STREP A ASSAY W/OPTIC: CPT | Performed by: PHYSICIAN ASSISTANT

## 2020-01-20 RX ORDER — OSELTAMIVIR PHOSPHATE 75 MG/1
75 CAPSULE ORAL 2 TIMES DAILY
Qty: 10 CAPSULE | Refills: 0 | Status: SHIPPED | OUTPATIENT
Start: 2020-01-20 | End: 2020-01-25

## 2020-01-20 RX ORDER — ALBUTEROL SULFATE 90 UG/1
2 AEROSOL, METERED RESPIRATORY (INHALATION) EVERY 6 HOURS PRN
Qty: 8.5 G | Refills: 0 | Status: SHIPPED | OUTPATIENT
Start: 2020-01-20 | End: 2021-09-21 | Stop reason: ALTCHOICE

## 2020-01-20 NOTE — PROGRESS NOTES
[facility]        NAME: Ana Lin is a 30 y o  female  : 1990    MRN: [id number]  DATE: 2020  TIME: 3:30 PM    /74 (BP Location: Left arm, Patient Position: Sitting, Cuff Size: Adult)   Pulse (!) 108   Temp 100 2 °F (37 9 °C) (Tympanic)   Resp 18   Ht 5' 1" (1 549 m)   Wt 108 kg (239 lb)   LMP 2019   SpO2 99%   BMI 45 16 kg/m²     Assessment and Plan   Influenza [J11 1]  1  Influenza  POCT rapid strepA    Influenza A/B and RSV by PCR    POCT urine dip    Throat culture    Urine culture    albuterol (PROAIR HFA) 90 mcg/act inhaler    oseltamivir (TAMIFLU) 75 mg capsule         Patient Instructions       Follow up with PCP in 3-5 days  Proceed to  ER if symptoms worsen  Chief Complaint     Chief Complaint   Patient presents with    Sore Throat     pt c/o sore throat x2 days  since yesterday bodyaches, chills, cough, and fever of 101 9 at home  pt took robatussin and tylenol (last dose @ 1000)         History of Present Illness       Pt with cough congestion fever and total body aches for 2 days     Sore Throat    This is a new problem  The current episode started yesterday  The problem has been unchanged  Neither side of throat is experiencing more pain than the other  There has been no fever  The pain is at a severity of 2/10  The pain is mild  Associated symptoms include congestion and coughing  Pertinent negatives include no abdominal pain, diarrhea, drooling, ear discharge, headaches, plugged ear sensation, neck pain, shortness of breath, swollen glands, trouble swallowing or vomiting  She has tried nothing for the symptoms  The treatment provided no relief  Review of Systems   Review of Systems   Constitutional: Negative  HENT: Positive for congestion and sore throat  Negative for drooling, ear discharge and trouble swallowing  Eyes: Negative  Respiratory: Positive for cough  Negative for shortness of breath      Cardiovascular: Negative  Gastrointestinal: Negative  Negative for abdominal pain, diarrhea and vomiting  Endocrine: Negative  Genitourinary: Negative  Musculoskeletal: Negative  Negative for neck pain  Skin: Negative  Allergic/Immunologic: Negative  Neurological: Negative  Negative for headaches  Hematological: Negative  Psychiatric/Behavioral: Negative  All other systems reviewed and are negative          Current Medications       Current Outpatient Medications:     Prenatal Vit-Fe Fumarate-FA (PRENATAL VITAMIN PO), Take by mouth, Disp: , Rfl:     albuterol (PROAIR HFA) 90 mcg/act inhaler, Inhale 2 puffs every 6 (six) hours as needed for wheezing, Disp: 8 5 g, Rfl: 0    oseltamivir (TAMIFLU) 75 mg capsule, Take 1 capsule (75 mg total) by mouth 2 (two) times a day for 5 days, Disp: 10 capsule, Rfl: 0    topiramate (TOPAMAX) 50 MG tablet, TAKE 1 TABLET BY ORAL ROUTE EVERY DAY (Patient not taking: Reported on 1/15/2020), Disp: 30 tablet, Rfl: 3    topiramate (TOPAMAX) 50 MG tablet, Take 1 tablet (50 mg total) by mouth daily (Patient not taking: Reported on 1/15/2020), Disp: 30 tablet, Rfl: 0    Current Allergies     Allergies as of 2020    (No Known Allergies)            The following portions of the patient's history were reviewed and updated as appropriate: allergies, current medications, past family history, past medical history, past social history, past surgical history and problem list      Past Medical History:   Diagnosis Date    Female infertility     PCOS related    Obesity, morbid, BMI 40 0-49 9 ([address] )     Polycystic ovary syndrome     Varicella        Past Surgical History:   Procedure Laterality Date     SECTION      x1       Family History   Problem Relation Age of Onset    Breast cancer Family     Lung cancer Family     Fibroids Mother     Heart disease Mother     HIV Mother     Kidney disease Mother     Asthma Son     Lung cancer Maternal Grandmother     Diabetes Maternal Grandmother     Prostate cancer Maternal Grandfather     Breast cancer Maternal Grandfather     Diabetes Maternal Grandfather          Medications have been verified  Objective   /74 (BP Location: Left arm, Patient Position: Sitting, Cuff Size: Adult)   Pulse (!) 108   Temp 100 2 °F (37 9 °C) (Tympanic)   Resp 18   Ht 5' 1" (1 549 m)   Wt 108 kg (239 lb)   LMP 09/23/2019   SpO2 99%   BMI 45 16 kg/m²        Physical Exam     Physical Exam   Constitutional: She is oriented to person, place, and time  She appears well-developed and well-nourished  Pt appears to not feel well   Coughing in office    HENT:   Head: Normocephalic and atraumatic  Right Ear: Hearing, tympanic membrane, external ear and ear canal normal    Left Ear: Hearing, tympanic membrane, external ear and ear canal normal    Nose: Nose normal    Mouth/Throat: Oropharynx is clear and moist    Eyes: Pupils are equal, round, and reactive to light  Conjunctivae and EOM are normal    Neck: Normal range of motion  Neck supple  Cardiovascular: Normal rate, regular rhythm, normal heart sounds and intact distal pulses  Pulmonary/Chest: Effort normal and breath sounds normal    Abdominal: Soft  Bowel sounds are normal    Musculoskeletal: Normal range of motion  Neurological: She is alert and oriented to person, place, and time  Skin: Skin is warm  Capillary refill takes less than 2 seconds  Psychiatric: She has a normal mood and affect  Her behavior is normal    Nursing note and vitals reviewed

## 2020-01-20 NOTE — PATIENT INSTRUCTIONS
Influenza   WHAT YOU NEED TO KNOW:   Influenza (the flu) is an infection caused by the influenza virus  The flu is easily spread when an infected person coughs, sneezes, or has close contact with others  You may be able to spread the flu to others for 1 week or longer after signs or symptoms appear  DISCHARGE INSTRUCTIONS:   Call 911 for any of the following:   · You have trouble breathing, and your lips look purple or blue  · You have a seizure  Return to the emergency department if:   · You are dizzy, or you are urinating less or not at all  · You have a headache with a stiff neck, and you feel tired or confused  · You have new pain or pressure in your chest     · Your symptoms, such as shortness of breath, vomiting, or diarrhea, get worse  · Your symptoms, such as fever and coughing, seem to get better, but then get worse  Contact your healthcare provider if:   · You have new muscle pain or weakness  · You have questions or concerns about your condition or care  Medicines: You may need any of the following:  · Acetaminophen  decreases pain and fever  It is available without a doctor's order  Ask how much to take and how often to take it  Follow directions  Acetaminophen can cause liver damage if not taken correctly  · NSAIDs , such as ibuprofen, help decrease swelling, pain, and fever  This medicine is available with or without a doctor's order  NSAIDs can cause stomach bleeding or kidney problems in certain people  If you take blood thinner medicine, always ask your healthcare provider if NSAIDs are safe for you  Always read the medicine label and follow directions  · Antivirals  help fight a viral infection  · Take your medicine as directed  Contact your healthcare provider if you think your medicine is not helping or if you have side effects  Tell him or her if you are allergic to any medicine  Keep a list of the medicines, vitamins, and herbs you take   Include the amounts, and when and why you take them  Bring the list or the pill bottles to follow-up visits  Carry your medicine list with you in case of an emergency  Rest  as much as you can to help you recover  Drink liquids as directed  to help prevent dehydration  Ask how much liquid to drink each day and which liquids are best for you  Prevent the spread of influenza:   · Wash your hands often  Use soap and water  Wash your hands after you use the bathroom, change a child's diapers, or sneeze  Wash your hands before you prepare or eat food  Use gel hand cleanser when soap and water are not available  Do not touch your eyes, nose, or mouth unless you have washed your hands first            · Cover your mouth when you sneeze or cough  Cough into a tissue or the bend of your arm  · Clean shared items with a germ-killing   Clean table surfaces, doorknobs, and light switches  Do not share towels, silverware, and dishes with people who are sick  Wash bed sheets, towels, silverware, and dishes with soap and water  · Wear a mask  over your mouth and nose if you are sick or are near anyone who is sick  · Stay away from others  if you are sick  · Influenza vaccine  helps prevent influenza (flu)  Everyone older than 6 months should get a yearly influenza vaccine  Get the vaccine as soon as it is available, usually in September or October each year  Follow up with your healthcare provider as directed:  Write down your questions so you remember to ask them during your visits  © 2017 [address] [employer] Information is for End User's use only and may not be sold, redistributed or otherwise used for commercial purposes  All illustrations and images included in CareNotes® are the copyrighted property of [employer] , [employer]  or Phil Hdez  The above information is an  only  It is not intended as medical advice for individual conditions or treatments   Talk to your doctor, nurse or pharmacist before following any medical regimen to see if it is safe and effective for you

## 2020-01-22 ENCOUNTER — TELEPHONE (OUTPATIENT)
Dept: URGENT CARE | Age: 30
End: 2020-01-22

## 2020-01-22 LAB
BACTERIA UR CULT: NORMAL
FLUAV RNA NPH QL NAA+PROBE: DETECTED
FLUBV RNA NPH QL NAA+PROBE: ABNORMAL
RSV RNA NPH QL NAA+PROBE: ABNORMAL

## 2020-01-22 NOTE — TELEPHONE ENCOUNTER
PT CALLED TODAY ACQUIRING FLU RESULTS, I TOLD THE PT THAT HER FLU RESULTS ARE POSITIVE AND SHE SHOULD CONTINUE TAKING THE TAMAFLU AS PRESCRIBED

## 2020-01-23 LAB — BACTERIA THROAT CULT: NORMAL

## 2020-01-24 ENCOUNTER — OFFICE VISIT (OUTPATIENT)
Dept: URGENT CARE | Age: 30
End: 2020-01-24
Payer: COMMERCIAL

## 2020-01-24 VITALS
SYSTOLIC BLOOD PRESSURE: 131 MMHG | TEMPERATURE: 97.6 F | RESPIRATION RATE: 18 BRPM | DIASTOLIC BLOOD PRESSURE: 75 MMHG | BODY MASS INDEX: 44.37 KG/M2 | HEART RATE: 94 BPM | HEIGHT: 61 IN | OXYGEN SATURATION: 100 % | WEIGHT: 235 LBS

## 2020-01-24 DIAGNOSIS — J11.1 INFLUENZA: Primary | ICD-10-CM

## 2020-01-24 PROCEDURE — G0382 LEV 3 HOSP TYPE B ED VISIT: HCPCS | Performed by: PHYSICIAN ASSISTANT

## 2020-01-24 NOTE — LETTER
January 24, 2020     Patient: Rigoberto Ontiveros   YOB: 1990   Date of Visit: 1/24/2020       To Whom It May Concern: It is my medical opinion that Rigoberto Ontiveros may return to work on 1/27/2020  If you have any questions or concerns, please don't hesitate to call           Sincerely,        Sami Acevedo PA-C    CC: No Recipients

## 2020-01-24 NOTE — PROGRESS NOTES
[facility]        NAME: Karen Duenas is a 30 y o  female  : 1990    MRN: [id number]  DATE: 2020  TIME: 2:01 PM    /75 (BP Location: Right arm, Patient Position: Sitting, Cuff Size: Adult)   Pulse 94   Temp 97 6 °F (36 4 °C) (Tympanic)   Resp 18   Ht 5' 1" (1 549 m)   Wt 107 kg (235 lb)   LMP 2019   SpO2 100%   BMI 44 40 kg/m²     Assessment and Plan   Influenza [J11 1]  1  Influenza           Patient Instructions       Follow up with PCP in 3-5 days  Proceed to  ER if symptoms worsen  Chief Complaint     Chief Complaint   Patient presents with    Cold Like Symptoms     pt was seen x4 days ago  tested positive for the flu  pt states work is requiring her to get a work note and re evaluated  pt states has sweats today and a cough  History of Present Illness       Pt for flu follow up pt feeling better but asking for longer work note pt taking her tamiflu     Cough   This is a new problem  The current episode started in the past 7 days  The problem has been unchanged  The cough is non-productive  Associated symptoms include myalgias  Pertinent negatives include no chest pain, chills, ear congestion, ear pain, fever, headaches, heartburn, hemoptysis, nasal congestion, postnasal drip, rash, rhinorrhea, sore throat, shortness of breath, sweats, weight loss or wheezing  Nothing aggravates the symptoms  She has tried prescription cough suppressant for the symptoms  The treatment provided mild relief  Review of Systems   Review of Systems   Constitutional: Negative  Negative for chills, fever and weight loss  HENT: Negative  Negative for ear pain, postnasal drip, rhinorrhea and sore throat  Eyes: Negative  Respiratory: Positive for cough  Negative for hemoptysis, shortness of breath and wheezing  Cardiovascular: Negative  Negative for chest pain  Gastrointestinal: Negative for heartburn  Endocrine: Negative  Genitourinary: Negative  Musculoskeletal: Positive for myalgias  Skin: Negative  Negative for rash  Allergic/Immunologic: Negative  Neurological: Negative  Negative for headaches  Hematological: Negative  Psychiatric/Behavioral: Negative  All other systems reviewed and are negative          Current Medications       Current Outpatient Medications:     albuterol (PROAIR HFA) 90 mcg/act inhaler, Inhale 2 puffs every 6 (six) hours as needed for wheezing, Disp: 8 5 g, Rfl: 0    oseltamivir (TAMIFLU) 75 mg capsule, Take 1 capsule (75 mg total) by mouth 2 (two) times a day for 5 days, Disp: 10 capsule, Rfl: 0    Prenatal Vit-Fe Fumarate-FA (PRENATAL VITAMIN PO), Take by mouth, Disp: , Rfl:     topiramate (TOPAMAX) 50 MG tablet, TAKE 1 TABLET BY ORAL ROUTE EVERY DAY (Patient not taking: Reported on 1/15/2020), Disp: 30 tablet, Rfl: 3    topiramate (TOPAMAX) 50 MG tablet, Take 1 tablet (50 mg total) by mouth daily (Patient not taking: Reported on 1/15/2020), Disp: 30 tablet, Rfl: 0    Current Allergies     Allergies as of 2020    (No Known Allergies)            The following portions of the patient's history were reviewed and updated as appropriate: allergies, current medications, past family history, past medical history, past social history, past surgical history and problem list      Past Medical History:   Diagnosis Date    Female infertility     PCOS related    Obesity, morbid, BMI 40 0-49 9 [address] [address]     Polycystic ovary syndrome     Varicella        Past Surgical History:   Procedure Laterality Date     SECTION      x1       Family History   Problem Relation Age of Onset    Breast cancer Family     Lung cancer Family     Fibroids Mother     Heart disease Mother     HIV Mother     Kidney disease Mother     Asthma Son     Lung cancer Maternal Grandmother     Diabetes Maternal Grandmother     Prostate cancer Maternal Grandfather     Breast cancer Maternal Grandfather  Diabetes Maternal Grandfather          Medications have been verified  Objective   /75 (BP Location: Right arm, Patient Position: Sitting, Cuff Size: Adult)   Pulse 94   Temp 97 6 °F (36 4 °C) (Tympanic)   Resp 18   Ht 5' 1" (1 549 m)   Wt 107 kg (235 lb)   LMP 09/23/2019   SpO2 100%   BMI 44 40 kg/m²        Physical Exam     Physical Exam   Constitutional: She is oriented to person, place, and time  She appears well-developed and well-nourished  HENT:   Head: Normocephalic and atraumatic  Right Ear: External ear normal    Left Ear: External ear normal    Nose: Nose normal    Mouth/Throat: Oropharynx is clear and moist    Eyes: Pupils are equal, round, and reactive to light  Conjunctivae and EOM are normal    Neck: Normal range of motion  Neck supple  Cardiovascular: Normal rate, regular rhythm, normal heart sounds and intact distal pulses  Pulmonary/Chest: Effort normal and breath sounds normal    Abdominal: Soft  Bowel sounds are normal    Musculoskeletal: Normal range of motion  Neurological: She is alert and oriented to person, place, and time  Skin: Skin is warm  Psychiatric: She has a normal mood and affect  Her behavior is normal    Nursing note and vitals reviewed

## 2020-01-24 NOTE — PATIENT INSTRUCTIONS
Influenza   WHAT YOU NEED TO KNOW:   Influenza (the flu) is an infection caused by the influenza virus  The flu is easily spread when an infected person coughs, sneezes, or has close contact with others  You may be able to spread the flu to others for 1 week or longer after signs or symptoms appear  DISCHARGE INSTRUCTIONS:   Call 911 for any of the following:   · You have trouble breathing, and your lips look purple or blue  · You have a seizure  Return to the emergency department if:   · You are dizzy, or you are urinating less or not at all  · You have a headache with a stiff neck, and you feel tired or confused  · You have new pain or pressure in your chest     · Your symptoms, such as shortness of breath, vomiting, or diarrhea, get worse  · Your symptoms, such as fever and coughing, seem to get better, but then get worse  Contact your healthcare provider if:   · You have new muscle pain or weakness  · You have questions or concerns about your condition or care  Medicines: You may need any of the following:  · Acetaminophen  decreases pain and fever  It is available without a doctor's order  Ask how much to take and how often to take it  Follow directions  Acetaminophen can cause liver damage if not taken correctly  · NSAIDs , such as ibuprofen, help decrease swelling, pain, and fever  This medicine is available with or without a doctor's order  NSAIDs can cause stomach bleeding or kidney problems in certain people  If you take blood thinner medicine, always ask your healthcare provider if NSAIDs are safe for you  Always read the medicine label and follow directions  · Antivirals  help fight a viral infection  · Take your medicine as directed  Contact your healthcare provider if you think your medicine is not helping or if you have side effects  Tell him or her if you are allergic to any medicine  Keep a list of the medicines, vitamins, and herbs you take   Include the amounts, and when and why you take them  Bring the list or the pill bottles to follow-up visits  Carry your medicine list with you in case of an emergency  Rest  as much as you can to help you recover  Drink liquids as directed  to help prevent dehydration  Ask how much liquid to drink each day and which liquids are best for you  Prevent the spread of influenza:   · Wash your hands often  Use soap and water  Wash your hands after you use the bathroom, change a child's diapers, or sneeze  Wash your hands before you prepare or eat food  Use gel hand cleanser when soap and water are not available  Do not touch your eyes, nose, or mouth unless you have washed your hands first            · Cover your mouth when you sneeze or cough  Cough into a tissue or the bend of your arm  · Clean shared items with a germ-killing   Clean table surfaces, doorknobs, and light switches  Do not share towels, silverware, and dishes with people who are sick  Wash bed sheets, towels, silverware, and dishes with soap and water  · Wear a mask  over your mouth and nose if you are sick or are near anyone who is sick  · Stay away from others  if you are sick  · Influenza vaccine  helps prevent influenza (flu)  Everyone older than 6 months should get a yearly influenza vaccine  Get the vaccine as soon as it is available, usually in September or October each year  Follow up with your healthcare provider as directed:  Write down your questions so you remember to ask them during your visits  © 2017 [employer] [employer] Information is for End User's use only and may not be sold, redistributed or otherwise used for commercial purposes  All illustrations and images included in CareNotes® are the copyrighted property of [employer] , [employer]  or Phil Hdez  The above information is an  only  It is not intended as medical advice for individual conditions or treatments   Talk to your doctor, nurse or pharmacist before following any medical regimen to see if it is safe and effective for you

## 2020-02-10 ENCOUNTER — ROUTINE PRENATAL (OUTPATIENT)
Dept: OBGYN CLINIC | Facility: CLINIC | Age: 30
End: 2020-02-10

## 2020-02-10 VITALS
HEART RATE: 91 BPM | OXYGEN SATURATION: 98 % | WEIGHT: 239.6 LBS | HEIGHT: 61 IN | BODY MASS INDEX: 45.24 KG/M2 | DIASTOLIC BLOOD PRESSURE: 84 MMHG | SYSTOLIC BLOOD PRESSURE: 134 MMHG

## 2020-02-10 DIAGNOSIS — O09.212 SUPERVISION OF PREGNANCY WITH HISTORY OF PRE-TERM LABOR IN SECOND TRIMESTER: ICD-10-CM

## 2020-02-10 DIAGNOSIS — E66.01 MATERNAL MORBID OBESITY, ANTEPARTUM, SECOND TRIMESTER (HCC): ICD-10-CM

## 2020-02-10 DIAGNOSIS — O99.212 MATERNAL MORBID OBESITY, ANTEPARTUM, SECOND TRIMESTER (HCC): ICD-10-CM

## 2020-02-10 DIAGNOSIS — O99.212 OBESITY AFFECTING PREGNANCY IN SECOND TRIMESTER: Primary | ICD-10-CM

## 2020-02-10 PROCEDURE — PNV: Performed by: OBSTETRICS & GYNECOLOGY

## 2020-02-10 NOTE — PROGRESS NOTES
Pt is a 30 y o  [id number] 15w1d  Pregnancy is complicated by Obesity, History of  and History of Pre-term Delivery  Pt reports +FM  Denies vb, lof, ctx  PTL precautions  Reviewed  Reviewed with patient use of Wyocena and recent withdrawal from the FDA, but that ACOG still supports use, pt agreeable to try--will order  Pt desires TOLAC  Pt has not had PN labs drawn as of yet, encouraged to do so  Pt reports she had influenza and did not have in the vaccination  Symptoms resolved  Has anatomy scan scheduled for 3/17  F/U 4 weeks

## 2020-03-02 ENCOUNTER — APPOINTMENT (OUTPATIENT)
Dept: LAB | Age: 30
End: 2020-03-02
Payer: COMMERCIAL

## 2020-03-02 DIAGNOSIS — O99.212 OBESITY AFFECTING PREGNANCY IN SECOND TRIMESTER: ICD-10-CM

## 2020-03-02 LAB
ABO GROUP BLD: NORMAL
BASOPHILS # BLD AUTO: 0.03 THOUSANDS/ΜL (ref 0–0.1)
BASOPHILS NFR BLD AUTO: 0 % (ref 0–1)
BLD GP AB SCN SERPL QL: NEGATIVE
EOSINOPHIL # BLD AUTO: 0.13 THOUSAND/ΜL (ref 0–0.61)
EOSINOPHIL NFR BLD AUTO: 2 % (ref 0–6)
ERYTHROCYTE [DISTWIDTH] IN BLOOD BY AUTOMATED COUNT: 13.4 % (ref 11.6–15.1)
EXTERNAL GENE TEST BETA-THALASSEMIA: NORMAL
HBV SURFACE AG SER QL: NORMAL
HCT VFR BLD AUTO: 35.4 % (ref 34.8–46.1)
HGB BLD-MCNC: 11.7 G/DL (ref 11.5–15.4)
IMM GRANULOCYTES # BLD AUTO: 0.03 THOUSAND/UL (ref 0–0.2)
IMM GRANULOCYTES NFR BLD AUTO: 0 % (ref 0–2)
LYMPHOCYTES # BLD AUTO: 1.51 THOUSANDS/ΜL (ref 0.6–4.47)
LYMPHOCYTES NFR BLD AUTO: 18 % (ref 14–44)
MCH RBC QN AUTO: 30.1 PG (ref 26.8–34.3)
MCHC RBC AUTO-ENTMCNC: 33.1 G/DL (ref 31.4–37.4)
MCV RBC AUTO: 91 FL (ref 82–98)
MONOCYTES # BLD AUTO: 0.46 THOUSAND/ΜL (ref 0.17–1.22)
MONOCYTES NFR BLD AUTO: 6 % (ref 4–12)
NEUTROPHILS # BLD AUTO: 6.11 THOUSANDS/ΜL (ref 1.85–7.62)
NEUTS SEG NFR BLD AUTO: 74 % (ref 43–75)
NRBC BLD AUTO-RTO: 0 /100 WBCS
PLATELET # BLD AUTO: 220 THOUSANDS/UL (ref 149–390)
PMV BLD AUTO: 11.3 FL (ref 8.9–12.7)
RBC # BLD AUTO: 3.89 MILLION/UL (ref 3.81–5.12)
RH BLD: POSITIVE
RUBV IGG SERPL IA-ACNC: 19.9 IU/ML
SPECIMEN EXPIRATION DATE: NORMAL
WBC # BLD AUTO: 8.27 THOUSAND/UL (ref 4.31–10.16)

## 2020-03-02 PROCEDURE — 87086 URINE CULTURE/COLONY COUNT: CPT | Performed by: NURSE PRACTITIONER

## 2020-03-02 PROCEDURE — 36415 COLL VENOUS BLD VENIPUNCTURE: CPT

## 2020-03-02 PROCEDURE — 80081 OBSTETRIC PANEL INC HIV TSTG: CPT

## 2020-03-02 PROCEDURE — 83020 HEMOGLOBIN ELECTROPHORESIS: CPT

## 2020-03-03 ENCOUNTER — OB ABSTRACT (OUTPATIENT)
Dept: OBGYN CLINIC | Facility: CLINIC | Age: 30
End: 2020-03-03

## 2020-03-03 LAB
BACTERIA UR CULT: NORMAL
HIV 1+2 AB+HIV1 P24 AG SERPL QL IA: NORMAL
RPR SER QL: NORMAL

## 2020-03-04 LAB
DEPRECATED HGB OTHER BLD-IMP: 0 %
HGB A MFR BLD: 2.4 % (ref 1.8–3.2)
HGB A MFR BLD: 97.6 % (ref 96.4–98.8)
HGB C MFR BLD: 0 %
HGB F MFR BLD: 0 % (ref 0–2)
HGB FRACT BLD-IMP: NORMAL
HGB S BLD QL SOLY: NEGATIVE
HGB S MFR BLD: 0 %

## 2020-03-05 ENCOUNTER — OB ABSTRACT (OUTPATIENT)
Dept: OBGYN CLINIC | Facility: CLINIC | Age: 30
End: 2020-03-05

## 2020-03-10 ENCOUNTER — ROUTINE PRENATAL (OUTPATIENT)
Dept: OBGYN CLINIC | Facility: CLINIC | Age: 30
End: 2020-03-10

## 2020-03-10 VITALS
HEART RATE: 106 BPM | SYSTOLIC BLOOD PRESSURE: 100 MMHG | WEIGHT: 242.3 LBS | BODY MASS INDEX: 45.78 KG/M2 | OXYGEN SATURATION: 98 % | DIASTOLIC BLOOD PRESSURE: 70 MMHG

## 2020-03-10 DIAGNOSIS — E66.01 MATERNAL MORBID OBESITY, ANTEPARTUM, SECOND TRIMESTER (HCC): ICD-10-CM

## 2020-03-10 DIAGNOSIS — O99.212 OBESITY AFFECTING PREGNANCY IN SECOND TRIMESTER: ICD-10-CM

## 2020-03-10 DIAGNOSIS — O09.212 SUPERVISION OF PREGNANCY WITH HISTORY OF PRE-TERM LABOR IN SECOND TRIMESTER: Primary | ICD-10-CM

## 2020-03-10 DIAGNOSIS — O99.212 MATERNAL MORBID OBESITY, ANTEPARTUM, SECOND TRIMESTER (HCC): ICD-10-CM

## 2020-03-10 PROCEDURE — PNV: Performed by: OBSTETRICS & GYNECOLOGY

## 2020-03-10 RX ORDER — HYDROXYPROGESTERONE CAPROATE 250 MG/ML
250 INJECTION INTRAMUSCULAR
Status: DISCONTINUED | OUTPATIENT
Start: 2020-03-10 | End: 2020-07-20 | Stop reason: HOSPADM

## 2020-03-10 RX ADMIN — HYDROXYPROGESTERONE CAPROATE 250 MG: 250 INJECTION INTRAMUSCULAR at 13:38

## 2020-03-10 NOTE — PROGRESS NOTES
Pt is a 30 y o  [id number] 19w2d  Pregnancy is complicated by morbid obesity, history of C/S, h o  delivery  Pt reports +FM  Denies vb, lof, ctx  PTL precautions reviewed  Pt received first carlos today  Prenatal labs wnl and reviewed with patient  Has anatomy scan scheduled  Pt reports SOB when she lies flat on her back, advised this is due to compression of IVC  Advised to lie on her side  F/u 4 weeks

## 2020-03-16 ENCOUNTER — TELEPHONE (OUTPATIENT)
Dept: PERINATAL CARE | Facility: CLINIC | Age: 30
End: 2020-03-16

## 2020-03-17 ENCOUNTER — ROUTINE PRENATAL (OUTPATIENT)
Dept: PERINATAL CARE | Facility: OTHER | Age: 30
End: 2020-03-17
Payer: COMMERCIAL

## 2020-03-17 ENCOUNTER — CLINICAL SUPPORT (OUTPATIENT)
Dept: OBGYN CLINIC | Facility: CLINIC | Age: 30
End: 2020-03-17
Payer: COMMERCIAL

## 2020-03-17 VITALS
HEIGHT: 61 IN | DIASTOLIC BLOOD PRESSURE: 70 MMHG | HEART RATE: 96 BPM | SYSTOLIC BLOOD PRESSURE: 119 MMHG | BODY MASS INDEX: 45.99 KG/M2 | WEIGHT: 243.6 LBS

## 2020-03-17 VITALS — HEIGHT: 61 IN | BODY MASS INDEX: 46.03 KG/M2 | WEIGHT: 243.8 LBS

## 2020-03-17 DIAGNOSIS — O09.892 HISTORY OF PREMATURE DELIVERY, CURRENTLY PREGNANT, SECOND TRIMESTER: ICD-10-CM

## 2020-03-17 DIAGNOSIS — Z98.891 HISTORY OF CESAREAN SECTION: ICD-10-CM

## 2020-03-17 DIAGNOSIS — O09.892 HISTORY OF PRETERM DELIVERY, CURRENTLY PREGNANT IN SECOND TRIMESTER: Primary | ICD-10-CM

## 2020-03-17 DIAGNOSIS — E66.01 MATERNAL MORBID OBESITY, ANTEPARTUM, SECOND TRIMESTER (HCC): ICD-10-CM

## 2020-03-17 DIAGNOSIS — Z3A.20 20 WEEKS GESTATION OF PREGNANCY: ICD-10-CM

## 2020-03-17 DIAGNOSIS — O99.212 MATERNAL MORBID OBESITY, ANTEPARTUM, SECOND TRIMESTER (HCC): ICD-10-CM

## 2020-03-17 DIAGNOSIS — O09.212 SUPERVISION OF PREGNANCY WITH HISTORY OF PRE-TERM LABOR IN SECOND TRIMESTER: ICD-10-CM

## 2020-03-17 PROCEDURE — 76811 OB US DETAILED SNGL FETUS: CPT | Performed by: OBSTETRICS & GYNECOLOGY

## 2020-03-17 PROCEDURE — 76817 TRANSVAGINAL US OBSTETRIC: CPT | Performed by: OBSTETRICS & GYNECOLOGY

## 2020-03-17 PROCEDURE — 96372 THER/PROPH/DIAG INJ SC/IM: CPT | Performed by: OBSTETRICS & GYNECOLOGY

## 2020-03-17 PROCEDURE — 99241 PR OFFICE CONSULTATION NEW/ESTAB PATIENT 15 MIN: CPT | Performed by: OBSTETRICS & GYNECOLOGY

## 2020-03-17 NOTE — PROGRESS NOTES
Thank you very much for your kind referral of Ken Reid for fetal ultrasound evaluation and MFM consult at [facility] on 2020  Jonathan Smith is a 18-year-old  white female who is currently at 21 and 2/7 weeks gestation by an estimated due date of 2020 which is based upon first-trimester ultrasound dating  Consultation is performed for the indications of prior spontaneous  birth and Class 3 obesity  Jonathan Smith has had an unremarkable prenatal course so far  She has no complaints today  She reports fetal movement and denies vaginal bleeding  Jonathan Smith has not yet been screened for gestational diabetes during this pregnancy  She declined genetic screening earlier in the pregnancy  Jonathan Smith has a history of two prior vaginal deliveries at term in  and  following uncomplicated prenatal courses  She delivered appropriately grown babies, each currently healthy  Her next pregnancy in  was complicated by  labor, resulting in delivery by emergency  section following a diagnosis of cord prolapse at 34 and 3/7 weeks gestation  She delivered a 6 lb 3 oz baby boy who had a one week NICU stay and is currently healthy  Jonathan Smith also has history one first-trimester spontaneous pregnancy loss  Her current BMI is 45 9  She has a history of PCOS  Her past medical history is otherwise unremarkable with the exception of asthma  She takes no medication with the exception of a prenatal vitamin on a daily basis, along with low-dose aspirin to reduce her risk for preeclampsia  She is treated with weekly 17 P given her history of prior spontaneous  birth  Jonathan Smith denies tobacco, alcohol, or illicit drug use during the pregnancy  The family medical history is negative with respect to first-degree relatives with diabetes, hypertension, venous thromboembolism, or preeclampsia    The family genetic history is negative with respect to genetic abnormalities, birth defects, or mental retardation  No fetal structural abnormality or ultrasound marker for aneuploidy is identified on the Level II ultrasound study today   Several anatomic targets are suboptimally imaged secondary to unfavorable fetal position and maternal body habitus  Fetal growth and amniotic fluid volume are normal   The placenta is normal in appearance  The cervix is normal in appearance by transvaginal sonography  The cervical length is normal   Cervical debris is not present  Cervical funneling is not present  Neither provocative nor dynamic change is appreciated  There is no suspicion of vasa previa using color Doppler evaluation  Today's ultrasound findings and suggested follow-up were discussed in detail with Jonathan Smith  We discussed that prenatal ultrasound cannot rule out all congenital abnormalities  The strongest predictor of  birth (PTB)  is a prior spontaneous  birth (sPTB)  Spontaneous  birth (sPTB) recurs in 28 to 48 percent of pregnancies, and tends to recur at similar gestational ages  Based upon the study of Minda et al, women with a prior sPTB between 12 and 36 weeks of gestation should be offered empiric prophylactic treatment with weekly IM progesterone therapy (17 P)  The initial dose of 17 P should be started at 16 weeks of gestation optimally  17 P should be given every week  17 P should be continued through 36 weeks of gestation  Based upon the Meis study, administration of 17 P will reduce Tamika's risk for recurrence of sPTB by about 30 percent  However, the recently published PROLONG study showed no benefit  of treatment with 17 P in reduction of recurrence of sPTB  Currently, the FDA is reviewing data related to use of 17 P (Charisse) during pregnancy  Current recommendations by ACOG and SMFM suggest continuation of prior guidelines with respect to use of 17 P during pregnancy until the FDA review is complete    Women with a prior sPTB between 12 and 36 weeks of gestation should undergo cervical length (CL) screening via transvaginal ultrasound (TVU)  Women found to have a CL less than or equal to 25 mm at less than 24 weeks gestation should be offered an ultrasound indicated cerclage  Sharri Luis will return in two weeks for transvaginal ultrasound assessment of her cervix  Class 3 obesity is associated with an increased risk for adverse pregnancy outcomes, including gestational diabetes, fetal growth abnormalities including macrosomia, fetal structural abnormalities, preeclampsia, venous thromboembolism, stillbirth, and increased likelihood for  section  Serial fetal growth evaluations are recommended during the second half of the pregnancy   Sharri Luis will return at about 28 weeks gestation to assess interval growth and evaluate anatomic targets not imaged well today  Weekly non stress testing is recommended for additional pregnancy surveillance beginning at 36 weeks gestation, sooner if otherwise indicated, with a BMI of 40 or greater  Screening for gestational diabetes should be considered soon  Continuation of daily low dose aspirin therapy is recommended until delivery  The face to face time, in addition to time spent discussing ultrasound results, was approximately 15 minutes, greater than 50% of which was spent during counseling and coordination of care

## 2020-03-17 NOTE — PROGRESS NOTES
A transvaginal ultrasound was performed  Sonographer note on use of High Level Disinfection process (Trophon) for transvaginal probe #3 used, serial [id number]     Nadeem ARTEAGA, RDMS, RVT

## 2020-03-17 NOTE — LETTER
March 17, 2020     Jessica Acevedo MD  [address] [place]     Patient: Aruna Tapia   YOB: 1990   Date of Visit: 3/17/2020       Dear Dr Agus East: Thank you for referring Aruna Tapia to me for evaluation  Below are my notes for this consultation  If you have questions, please do not hesitate to call me  I look forward to following your patient along with you           Sincerely,        Daria Arias MD        CC: No Recipients

## 2020-03-24 ENCOUNTER — TELEPHONE (OUTPATIENT)
Dept: PERINATAL CARE | Facility: OTHER | Age: 30
End: 2020-03-24

## 2020-03-24 ENCOUNTER — CLINICAL SUPPORT (OUTPATIENT)
Dept: OBGYN CLINIC | Facility: CLINIC | Age: 30
End: 2020-03-24

## 2020-03-24 ENCOUNTER — TELEPHONE (OUTPATIENT)
Dept: OBGYN CLINIC | Facility: CLINIC | Age: 30
End: 2020-03-24

## 2020-03-24 VITALS — WEIGHT: 243.8 LBS | BODY MASS INDEX: 46.07 KG/M2

## 2020-03-24 DIAGNOSIS — O09.892 HISTORY OF PREMATURE DELIVERY, CURRENTLY PREGNANT, SECOND TRIMESTER: ICD-10-CM

## 2020-03-24 PROCEDURE — 96372 THER/PROPH/DIAG INJ SC/IM: CPT | Performed by: OBSTETRICS & GYNECOLOGY

## 2020-03-24 NOTE — TELEPHONE ENCOUNTER
Patient in office today for Kelso injection, last Gallito Sen was used  Attempted to call [facility] [address] [place] [address] for refills  and spoke with Dylan @4:27  [facility] has been trying to get in contact with patient for refills  Patient called and aware and given number to do so       [facility] [address] [place] [address] [phone] option 2

## 2020-03-24 NOTE — TELEPHONE ENCOUNTER
Due to ongoing concerns with coronavirus, Dr Nicky Escobedo, our MFM , reviewed your chart and feels it is safe to postpone your ultrasound date  Spoke to patient appt for 3/31 cx we will see patient in May  Patient ok with the cancellation        Jenifer Newburg

## 2020-03-24 NOTE — TELEPHONE ENCOUNTER
Dylan from from [facility] [address] [place] [address] called stating she did speak with patient  Delivery for patients Charisse to be here 3/26/2020

## 2020-03-31 ENCOUNTER — CLINICAL SUPPORT (OUTPATIENT)
Dept: OBGYN CLINIC | Facility: CLINIC | Age: 30
End: 2020-03-31

## 2020-03-31 DIAGNOSIS — O09.892 HISTORY OF PREMATURE DELIVERY, CURRENTLY PREGNANT, SECOND TRIMESTER: Primary | ICD-10-CM

## 2020-04-03 ENCOUNTER — TELEMEDICINE (OUTPATIENT)
Dept: FAMILY MEDICINE CLINIC | Facility: CLINIC | Age: 30
End: 2020-04-03
Payer: COMMERCIAL

## 2020-04-03 ENCOUNTER — TELEPHONE (OUTPATIENT)
Dept: OBGYN CLINIC | Facility: CLINIC | Age: 30
End: 2020-04-03

## 2020-04-03 VITALS — TEMPERATURE: 97.8 F

## 2020-04-03 DIAGNOSIS — Z20.828 EXPOSURE TO SARS-ASSOCIATED CORONAVIRUS: Primary | ICD-10-CM

## 2020-04-03 DIAGNOSIS — R05.9 COUGH: ICD-10-CM

## 2020-04-03 DIAGNOSIS — Z20.828 EXPOSURE TO SARS-ASSOCIATED CORONAVIRUS: ICD-10-CM

## 2020-04-03 PROCEDURE — 87635 SARS-COV-2 COVID-19 AMP PRB: CPT

## 2020-04-03 PROCEDURE — 99213 OFFICE O/P EST LOW 20 MIN: CPT | Performed by: FAMILY MEDICINE

## 2020-04-05 LAB — SARS-COV-2 RNA SPEC QL NAA+PROBE: NOT DETECTED

## 2020-04-06 ENCOUNTER — TELEMEDICINE (OUTPATIENT)
Dept: PERINATAL CARE | Facility: OTHER | Age: 30
End: 2020-04-06
Payer: COMMERCIAL

## 2020-04-06 ENCOUNTER — TELEMEDICINE (OUTPATIENT)
Dept: OBGYN CLINIC | Facility: CLINIC | Age: 30
End: 2020-04-06

## 2020-04-06 VITALS — WEIGHT: 243.5 LBS | BODY MASS INDEX: 46.01 KG/M2

## 2020-04-06 DIAGNOSIS — O09.212 SUPERVISION OF PREGNANCY WITH HISTORY OF PRE-TERM LABOR IN SECOND TRIMESTER: ICD-10-CM

## 2020-04-06 DIAGNOSIS — O99.519 ASTHMA AFFECTING PREGNANCY, ANTEPARTUM: ICD-10-CM

## 2020-04-06 DIAGNOSIS — J45.909 ASTHMA AFFECTING PREGNANCY, ANTEPARTUM: ICD-10-CM

## 2020-04-06 DIAGNOSIS — E66.01 MATERNAL MORBID OBESITY, ANTEPARTUM, SECOND TRIMESTER (HCC): ICD-10-CM

## 2020-04-06 DIAGNOSIS — Z20.828 EXPOSURE TO SARS-ASSOCIATED CORONAVIRUS: Primary | ICD-10-CM

## 2020-04-06 DIAGNOSIS — Z98.891 HISTORY OF CESAREAN SECTION: Primary | ICD-10-CM

## 2020-04-06 DIAGNOSIS — O99.212 MATERNAL MORBID OBESITY, ANTEPARTUM, SECOND TRIMESTER (HCC): ICD-10-CM

## 2020-04-06 DIAGNOSIS — O09.892 HISTORY OF PREMATURE DELIVERY, CURRENTLY PREGNANT, SECOND TRIMESTER: ICD-10-CM

## 2020-04-06 PROCEDURE — PNV: Performed by: OBSTETRICS & GYNECOLOGY

## 2020-04-06 PROCEDURE — G2012 BRIEF CHECK IN BY MD/QHP: HCPCS | Performed by: OBSTETRICS & GYNECOLOGY

## 2020-04-06 RX ORDER — ADHESIVE BANDAGE 3/4"
BANDAGE TOPICAL ONCE
Qty: 1 EACH | Refills: 0 | Status: SHIPPED | OUTPATIENT
Start: 2020-04-06 | End: 2020-04-06

## 2020-04-09 ENCOUNTER — CLINICAL SUPPORT (OUTPATIENT)
Dept: OBGYN CLINIC | Facility: CLINIC | Age: 30
End: 2020-04-09
Payer: COMMERCIAL

## 2020-04-09 ENCOUNTER — TELEPHONE (OUTPATIENT)
Dept: FAMILY MEDICINE CLINIC | Facility: CLINIC | Age: 30
End: 2020-04-09

## 2020-04-09 DIAGNOSIS — O09.892 HISTORY OF PREMATURE DELIVERY, CURRENTLY PREGNANT, SECOND TRIMESTER: Primary | ICD-10-CM

## 2020-04-09 PROCEDURE — 96372 THER/PROPH/DIAG INJ SC/IM: CPT | Performed by: OBSTETRICS & GYNECOLOGY

## 2020-04-14 ENCOUNTER — TELEPHONE (OUTPATIENT)
Dept: PERINATAL CARE | Facility: OTHER | Age: 30
End: 2020-04-14

## 2020-04-14 ENCOUNTER — CLINICAL SUPPORT (OUTPATIENT)
Dept: OBGYN CLINIC | Facility: CLINIC | Age: 30
End: 2020-04-14

## 2020-04-14 ENCOUNTER — TELEPHONE (OUTPATIENT)
Dept: OBGYN CLINIC | Facility: CLINIC | Age: 30
End: 2020-04-14

## 2020-04-14 DIAGNOSIS — O09.892 HISTORY OF PREMATURE DELIVERY, CURRENTLY PREGNANT, SECOND TRIMESTER: Primary | ICD-10-CM

## 2020-05-05 ENCOUNTER — ROUTINE PRENATAL (OUTPATIENT)
Dept: OBGYN CLINIC | Facility: CLINIC | Age: 30
End: 2020-05-05

## 2020-05-05 VITALS
WEIGHT: 256.8 LBS | DIASTOLIC BLOOD PRESSURE: 78 MMHG | SYSTOLIC BLOOD PRESSURE: 130 MMHG | HEIGHT: 62 IN | BODY MASS INDEX: 47.26 KG/M2

## 2020-05-05 DIAGNOSIS — O99.212 MATERNAL MORBID OBESITY, ANTEPARTUM, SECOND TRIMESTER (HCC): ICD-10-CM

## 2020-05-05 DIAGNOSIS — J45.909 ASTHMA AFFECTING PREGNANCY, ANTEPARTUM: Primary | ICD-10-CM

## 2020-05-05 DIAGNOSIS — O99.519 ASTHMA AFFECTING PREGNANCY, ANTEPARTUM: Primary | ICD-10-CM

## 2020-05-05 DIAGNOSIS — O09.892 HISTORY OF PREMATURE DELIVERY, CURRENTLY PREGNANT, SECOND TRIMESTER: ICD-10-CM

## 2020-05-05 DIAGNOSIS — O09.212 SUPERVISION OF PREGNANCY WITH HISTORY OF PRE-TERM LABOR IN SECOND TRIMESTER: ICD-10-CM

## 2020-05-05 DIAGNOSIS — O34.219 HISTORY OF CESAREAN DELIVERY, ANTEPARTUM: ICD-10-CM

## 2020-05-05 DIAGNOSIS — E66.01 MATERNAL MORBID OBESITY, ANTEPARTUM, SECOND TRIMESTER (HCC): ICD-10-CM

## 2020-05-05 PROCEDURE — PNV: Performed by: OBSTETRICS & GYNECOLOGY

## 2020-05-11 ENCOUNTER — TELEPHONE (OUTPATIENT)
Dept: PERINATAL CARE | Facility: OTHER | Age: 30
End: 2020-05-11

## 2020-05-11 PROBLEM — O99.213 MATERNAL MORBID OBESITY, ANTEPARTUM, THIRD TRIMESTER (HCC): Status: ACTIVE | Noted: 2020-02-10

## 2020-05-11 PROBLEM — O09.893 HISTORY OF PREMATURE DELIVERY, CURRENTLY PREGNANT, THIRD TRIMESTER: Status: ACTIVE | Noted: 2020-03-17

## 2020-05-12 ENCOUNTER — CLINICAL SUPPORT (OUTPATIENT)
Dept: OBGYN CLINIC | Facility: CLINIC | Age: 30
End: 2020-05-12
Payer: COMMERCIAL

## 2020-05-12 ENCOUNTER — ULTRASOUND (OUTPATIENT)
Dept: PERINATAL CARE | Facility: OTHER | Age: 30
End: 2020-05-12
Payer: COMMERCIAL

## 2020-05-12 VITALS
HEART RATE: 106 BPM | HEIGHT: 62 IN | DIASTOLIC BLOOD PRESSURE: 77 MMHG | TEMPERATURE: 98 F | WEIGHT: 261.2 LBS | SYSTOLIC BLOOD PRESSURE: 115 MMHG | BODY MASS INDEX: 48.07 KG/M2

## 2020-05-12 DIAGNOSIS — O34.211 MATERNAL CARE DUE TO LOW TRANSVERSE UTERINE SCAR FROM PREVIOUS CESAREAN DELIVERY: ICD-10-CM

## 2020-05-12 DIAGNOSIS — O99.519 ASTHMA AFFECTING PREGNANCY, ANTEPARTUM: ICD-10-CM

## 2020-05-12 DIAGNOSIS — O09.892 HISTORY OF PRETERM DELIVERY, CURRENTLY PREGNANT IN SECOND TRIMESTER: Primary | ICD-10-CM

## 2020-05-12 DIAGNOSIS — E66.01 MATERNAL MORBID OBESITY, ANTEPARTUM, THIRD TRIMESTER (HCC): Primary | ICD-10-CM

## 2020-05-12 DIAGNOSIS — O99.213 MATERNAL MORBID OBESITY, ANTEPARTUM, THIRD TRIMESTER (HCC): Primary | ICD-10-CM

## 2020-05-12 DIAGNOSIS — J45.909 ASTHMA AFFECTING PREGNANCY, ANTEPARTUM: ICD-10-CM

## 2020-05-12 DIAGNOSIS — O09.893 HISTORY OF PREMATURE DELIVERY, CURRENTLY PREGNANT, THIRD TRIMESTER: ICD-10-CM

## 2020-05-12 DIAGNOSIS — Z3A.28 28 WEEKS GESTATION OF PREGNANCY: ICD-10-CM

## 2020-05-12 PROCEDURE — 76816 OB US FOLLOW-UP PER FETUS: CPT | Performed by: OBSTETRICS & GYNECOLOGY

## 2020-05-12 PROCEDURE — 96372 THER/PROPH/DIAG INJ SC/IM: CPT | Performed by: OBSTETRICS & GYNECOLOGY

## 2020-05-19 ENCOUNTER — ROUTINE PRENATAL (OUTPATIENT)
Dept: OBGYN CLINIC | Facility: CLINIC | Age: 30
End: 2020-05-19

## 2020-05-19 VITALS
SYSTOLIC BLOOD PRESSURE: 138 MMHG | HEART RATE: 108 BPM | BODY MASS INDEX: 47.55 KG/M2 | WEIGHT: 260 LBS | TEMPERATURE: 97.1 F | OXYGEN SATURATION: 98 % | DIASTOLIC BLOOD PRESSURE: 82 MMHG

## 2020-05-19 DIAGNOSIS — E66.01 MATERNAL MORBID OBESITY IN THIRD TRIMESTER, ANTEPARTUM (HCC): ICD-10-CM

## 2020-05-19 DIAGNOSIS — O99.519 ASTHMA AFFECTING PREGNANCY, ANTEPARTUM: ICD-10-CM

## 2020-05-19 DIAGNOSIS — J45.909 ASTHMA AFFECTING PREGNANCY, ANTEPARTUM: ICD-10-CM

## 2020-05-19 DIAGNOSIS — O99.213 MATERNAL MORBID OBESITY IN THIRD TRIMESTER, ANTEPARTUM (HCC): ICD-10-CM

## 2020-05-19 DIAGNOSIS — O09.893 HISTORY OF PRETERM DELIVERY, CURRENTLY PREGNANT IN THIRD TRIMESTER: Primary | ICD-10-CM

## 2020-05-19 PROCEDURE — PNV: Performed by: NURSE PRACTITIONER

## 2020-05-26 ENCOUNTER — TELEPHONE (OUTPATIENT)
Dept: OBGYN CLINIC | Facility: CLINIC | Age: 30
End: 2020-05-26

## 2020-05-27 ENCOUNTER — APPOINTMENT (OUTPATIENT)
Dept: LAB | Age: 30
End: 2020-05-27
Payer: COMMERCIAL

## 2020-05-27 ENCOUNTER — TELEPHONE (OUTPATIENT)
Dept: OBGYN CLINIC | Facility: CLINIC | Age: 30
End: 2020-05-27

## 2020-05-27 ENCOUNTER — CLINICAL SUPPORT (OUTPATIENT)
Dept: OBGYN CLINIC | Facility: CLINIC | Age: 30
End: 2020-05-27
Payer: COMMERCIAL

## 2020-05-27 DIAGNOSIS — O09.892 HISTORY OF PREMATURE DELIVERY, CURRENTLY PREGNANT, SECOND TRIMESTER: ICD-10-CM

## 2020-05-27 DIAGNOSIS — E66.01 MATERNAL MORBID OBESITY, ANTEPARTUM, SECOND TRIMESTER (HCC): ICD-10-CM

## 2020-05-27 DIAGNOSIS — O99.519 ASTHMA AFFECTING PREGNANCY, ANTEPARTUM: ICD-10-CM

## 2020-05-27 DIAGNOSIS — J45.909 ASTHMA AFFECTING PREGNANCY, ANTEPARTUM: ICD-10-CM

## 2020-05-27 DIAGNOSIS — O99.212 MATERNAL MORBID OBESITY, ANTEPARTUM, SECOND TRIMESTER (HCC): ICD-10-CM

## 2020-05-27 DIAGNOSIS — O09.212 SUPERVISION OF PREGNANCY WITH HISTORY OF PRE-TERM LABOR IN SECOND TRIMESTER: ICD-10-CM

## 2020-05-27 DIAGNOSIS — O34.219 HISTORY OF CESAREAN DELIVERY, ANTEPARTUM: ICD-10-CM

## 2020-05-27 DIAGNOSIS — O09.893 HISTORY OF PREMATURE DELIVERY, CURRENTLY PREGNANT, THIRD TRIMESTER: ICD-10-CM

## 2020-05-27 LAB
ERYTHROCYTE [DISTWIDTH] IN BLOOD BY AUTOMATED COUNT: 13.5 % (ref 11.6–15.1)
GLUCOSE 1H P 50 G GLC PO SERPL-MCNC: 139 MG/DL
HCT VFR BLD AUTO: 32.3 % (ref 34.8–46.1)
HGB BLD-MCNC: 10.6 G/DL (ref 11.5–15.4)
MCH RBC QN AUTO: 30.5 PG (ref 26.8–34.3)
MCHC RBC AUTO-ENTMCNC: 32.8 G/DL (ref 31.4–37.4)
MCV RBC AUTO: 93 FL (ref 82–98)
PLATELET # BLD AUTO: 203 THOUSANDS/UL (ref 149–390)
PMV BLD AUTO: 10.9 FL (ref 8.9–12.7)
RBC # BLD AUTO: 3.48 MILLION/UL (ref 3.81–5.12)
WBC # BLD AUTO: 9.72 THOUSAND/UL (ref 4.31–10.16)

## 2020-05-27 PROCEDURE — 85027 COMPLETE CBC AUTOMATED: CPT

## 2020-05-27 PROCEDURE — 36415 COLL VENOUS BLD VENIPUNCTURE: CPT

## 2020-05-27 PROCEDURE — 86592 SYPHILIS TEST NON-TREP QUAL: CPT

## 2020-05-27 PROCEDURE — 96372 THER/PROPH/DIAG INJ SC/IM: CPT | Performed by: OBSTETRICS & GYNECOLOGY

## 2020-05-27 PROCEDURE — 82950 GLUCOSE TEST: CPT

## 2020-05-28 ENCOUNTER — TELEPHONE (OUTPATIENT)
Dept: OBGYN CLINIC | Facility: CLINIC | Age: 30
End: 2020-05-28

## 2020-05-28 DIAGNOSIS — R73.09 ELEVATED GLUCOSE TOLERANCE TEST: Primary | ICD-10-CM

## 2020-05-28 LAB — RPR SER QL: NORMAL

## 2020-05-29 ENCOUNTER — TELEPHONE (OUTPATIENT)
Dept: PERINATAL CARE | Facility: OTHER | Age: 30
End: 2020-05-29

## 2020-06-01 ENCOUNTER — TELEPHONE (OUTPATIENT)
Dept: OBGYN CLINIC | Facility: CLINIC | Age: 30
End: 2020-06-01

## 2020-06-02 ENCOUNTER — ROUTINE PRENATAL (OUTPATIENT)
Dept: OBGYN CLINIC | Facility: CLINIC | Age: 30
End: 2020-06-02
Payer: COMMERCIAL

## 2020-06-02 VITALS
HEART RATE: 98 BPM | TEMPERATURE: 94.6 F | OXYGEN SATURATION: 99 % | BODY MASS INDEX: 47.92 KG/M2 | DIASTOLIC BLOOD PRESSURE: 82 MMHG | SYSTOLIC BLOOD PRESSURE: 124 MMHG | WEIGHT: 262 LBS

## 2020-06-02 DIAGNOSIS — Z23 NEED FOR TDAP VACCINATION: ICD-10-CM

## 2020-06-02 DIAGNOSIS — O09.213 SUPERVISION OF PREGNANCY WITH HISTORY OF PRE-TERM LABOR IN THIRD TRIMESTER: ICD-10-CM

## 2020-06-02 DIAGNOSIS — O99.213 MATERNAL MORBID OBESITY, ANTEPARTUM, THIRD TRIMESTER (HCC): ICD-10-CM

## 2020-06-02 DIAGNOSIS — O09.893 HISTORY OF PREMATURE DELIVERY, CURRENTLY PREGNANT, THIRD TRIMESTER: Primary | ICD-10-CM

## 2020-06-02 DIAGNOSIS — E66.01 MATERNAL MORBID OBESITY, ANTEPARTUM, THIRD TRIMESTER (HCC): ICD-10-CM

## 2020-06-02 DIAGNOSIS — O34.219 HISTORY OF CESAREAN DELIVERY, ANTEPARTUM: ICD-10-CM

## 2020-06-02 DIAGNOSIS — O99.519 ASTHMA AFFECTING PREGNANCY, ANTEPARTUM: ICD-10-CM

## 2020-06-02 DIAGNOSIS — J45.909 ASTHMA AFFECTING PREGNANCY, ANTEPARTUM: ICD-10-CM

## 2020-06-02 PROCEDURE — 90472 IMMUNIZATION ADMIN EACH ADD: CPT

## 2020-06-02 PROCEDURE — 90471 IMMUNIZATION ADMIN: CPT

## 2020-06-02 PROCEDURE — PNV: Performed by: OBSTETRICS & GYNECOLOGY

## 2020-06-02 PROCEDURE — 90715 TDAP VACCINE 7 YRS/> IM: CPT

## 2020-06-09 ENCOUNTER — CLINICAL SUPPORT (OUTPATIENT)
Dept: OBGYN CLINIC | Facility: CLINIC | Age: 30
End: 2020-06-09

## 2020-06-09 DIAGNOSIS — O09.893 HISTORY OF PREMATURE DELIVERY, CURRENTLY PREGNANT, THIRD TRIMESTER: Primary | ICD-10-CM

## 2020-06-22 ENCOUNTER — TELEPHONE (OUTPATIENT)
Dept: OBGYN CLINIC | Facility: CLINIC | Age: 30
End: 2020-06-22

## 2020-06-23 ENCOUNTER — CLINICAL SUPPORT (OUTPATIENT)
Dept: OBGYN CLINIC | Facility: CLINIC | Age: 30
End: 2020-06-23
Payer: COMMERCIAL

## 2020-06-23 VITALS
BODY MASS INDEX: 47.55 KG/M2 | TEMPERATURE: 97.6 F | WEIGHT: 260 LBS | DIASTOLIC BLOOD PRESSURE: 76 MMHG | SYSTOLIC BLOOD PRESSURE: 118 MMHG

## 2020-06-23 DIAGNOSIS — O09.893 HISTORY OF PREMATURE DELIVERY, CURRENTLY PREGNANT, THIRD TRIMESTER: ICD-10-CM

## 2020-06-23 PROCEDURE — 96372 THER/PROPH/DIAG INJ SC/IM: CPT | Performed by: OBSTETRICS & GYNECOLOGY

## 2020-07-01 ENCOUNTER — ROUTINE PRENATAL (OUTPATIENT)
Dept: OBGYN CLINIC | Facility: CLINIC | Age: 30
End: 2020-07-01

## 2020-07-01 VITALS
SYSTOLIC BLOOD PRESSURE: 108 MMHG | WEIGHT: 260 LBS | OXYGEN SATURATION: 98 % | BODY MASS INDEX: 47.55 KG/M2 | TEMPERATURE: 97.4 F | HEART RATE: 116 BPM | DIASTOLIC BLOOD PRESSURE: 80 MMHG

## 2020-07-01 DIAGNOSIS — O09.213 SUPERVISION OF PREGNANCY WITH HISTORY OF PRE-TERM LABOR IN THIRD TRIMESTER: ICD-10-CM

## 2020-07-01 DIAGNOSIS — O99.519 ASTHMA AFFECTING PREGNANCY, ANTEPARTUM: ICD-10-CM

## 2020-07-01 DIAGNOSIS — J45.909 ASTHMA AFFECTING PREGNANCY, ANTEPARTUM: ICD-10-CM

## 2020-07-01 DIAGNOSIS — O99.213 MATERNAL MORBID OBESITY, ANTEPARTUM, THIRD TRIMESTER (HCC): Primary | ICD-10-CM

## 2020-07-01 DIAGNOSIS — O09.893 HISTORY OF PREMATURE DELIVERY, CURRENTLY PREGNANT, THIRD TRIMESTER: ICD-10-CM

## 2020-07-01 DIAGNOSIS — O34.219 HISTORY OF CESAREAN DELIVERY, ANTEPARTUM: ICD-10-CM

## 2020-07-01 DIAGNOSIS — E66.01 MATERNAL MORBID OBESITY, ANTEPARTUM, THIRD TRIMESTER (HCC): Primary | ICD-10-CM

## 2020-07-01 DIAGNOSIS — R73.09 ELEVATED GLUCOSE TOLERANCE TEST: ICD-10-CM

## 2020-07-01 PROCEDURE — 87653 STREP B DNA AMP PROBE: CPT | Performed by: OBSTETRICS & GYNECOLOGY

## 2020-07-01 PROCEDURE — PNV: Performed by: OBSTETRICS & GYNECOLOGY

## 2020-07-01 NOTE — PROGRESS NOTES
Pt is a 26 y o  [id number] 35w3d  Pregnancy is complicated by morbid obesity, h o c/s desires TOLAC, h o PTD, elevated 1 hour and did not have 3 hour gtt done  Reviewed importance of 3 hour gtt and advised patient to complete it  Pt reports +FM  Denies vb, lof  Pt reports occ ctx  Labor precautions and fkc reviewed  [place] administered  Growth scan at 36 weeks; S>D dates  GBS and consents today  F/u 1 week

## 2020-07-02 ENCOUNTER — TELEPHONE (OUTPATIENT)
Dept: PERINATAL CARE | Facility: CLINIC | Age: 30
End: 2020-07-02

## 2020-07-02 NOTE — TELEPHONE ENCOUNTER
Spoke with patient and confirmed appointment with [facility]  1 support person ( must be over age of 15) may accompany you for your appointment  Are you and your support person able to wear a mask without a valve during entire appointment YES  [facility] does not allow cell phone use, recording device or streaming during the ultrasound  Check in and rooming questions will be done via phone, when you arrive in the parking lot please call the following inside line # prior to entering office:    [facility] [phone]  [facility] [place]: [phone] line:  [phone] line: [phone]  [place] line:  [phone]  [place] line: [phone]    Do you or your support person currently have:  Fever or flu- like symptoms? NO  Symptoms of upper respiratory infection like runny nose, sore throat or cough? NO  Do you have new headache that you have not had in the past? NO  Have you experienced any new shortness of breath recently? NO  Do you have any new loss of taste or smell? NO  Do you have any new diarrhea, nausea or vomiting? NO  Have you recently been in contact with anyone who has been sick or diagnosed with COVID-19 infection? NO  Have you been recommended to quarantine because of an exposure to a confirmed positive COVID19 person?  NO   You and your support person will be screened upon arrival     Patient verbalized understanding of all instructions   -------------------------------------------------------------

## 2020-07-03 PROBLEM — O99.820 GROUP B STREPTOCOCCUS CARRIER, ANTEPARTUM: Status: ACTIVE | Noted: 2020-07-03

## 2020-07-03 LAB — GP B STREP DNA SPEC QL NAA+PROBE: ABNORMAL

## 2020-07-06 ENCOUNTER — ROUTINE PRENATAL (OUTPATIENT)
Dept: PERINATAL CARE | Facility: OTHER | Age: 30
End: 2020-07-06
Payer: COMMERCIAL

## 2020-07-06 ENCOUNTER — ULTRASOUND (OUTPATIENT)
Dept: PERINATAL CARE | Facility: OTHER | Age: 30
End: 2020-07-06
Payer: COMMERCIAL

## 2020-07-06 VITALS
BODY MASS INDEX: 49.47 KG/M2 | HEIGHT: 61 IN | WEIGHT: 262 LBS | TEMPERATURE: 97.6 F | HEART RATE: 110 BPM | SYSTOLIC BLOOD PRESSURE: 123 MMHG | DIASTOLIC BLOOD PRESSURE: 81 MMHG

## 2020-07-06 DIAGNOSIS — E66.01 MATERNAL MORBID OBESITY, ANTEPARTUM, THIRD TRIMESTER (HCC): Primary | ICD-10-CM

## 2020-07-06 DIAGNOSIS — E66.01 MATERNAL MORBID OBESITY, ANTEPARTUM, THIRD TRIMESTER (HCC): ICD-10-CM

## 2020-07-06 DIAGNOSIS — Z3A.36 36 WEEKS GESTATION OF PREGNANCY: ICD-10-CM

## 2020-07-06 DIAGNOSIS — O99.213 MATERNAL MORBID OBESITY, ANTEPARTUM, THIRD TRIMESTER (HCC): ICD-10-CM

## 2020-07-06 DIAGNOSIS — O99.213 MATERNAL MORBID OBESITY, ANTEPARTUM, THIRD TRIMESTER (HCC): Primary | ICD-10-CM

## 2020-07-06 DIAGNOSIS — Z03.74 SUSPECTED PROBLEM WITH FETAL GROWTH NOT FOUND: Primary | ICD-10-CM

## 2020-07-06 PROCEDURE — 76816 OB US FOLLOW-UP PER FETUS: CPT | Performed by: OBSTETRICS & GYNECOLOGY

## 2020-07-06 PROCEDURE — 59025 FETAL NON-STRESS TEST: CPT | Performed by: OBSTETRICS & GYNECOLOGY

## 2020-07-06 PROCEDURE — NC001 PR NO CHARGE

## 2020-07-06 NOTE — LETTER
2020     Agustin Nice MD  [address] [place]     Patient: Edil Faulkner   YOB: 1990   Date of Visit: 2020       Dear Dr Ally Hunt: Thank you for referring Edil Faulkner to me for evaluation  Below are my notes for this consultation  If you have questions, please do not hesitate to call me  I look forward to following your patient along with you  Sincerely,        Alma Rosa Segal MD        CC: No Recipients  Alma Rosa Segal MD  2020 12:43 PM  Sign at close encounter  Edil Faulkner has no complaints today  She reports regular fetal movements and denies problems related to hypertension,  labor, or vaginal bleeding  Her prenatal course has apparently been unremarkable in the interim since her most recent visit here  She reports that in her OB office the baby was measuring 5 weeks ahead by fundal height  She failed her Glucola with a value of 139 on  and has a 3 hour glucose tolerance test ordered  Problem list:  1  Increased BMI  2  Prior  delivery at 35 weeks and 6 days  Delivered stat by low-transverse  for cord prolapse  She is receiving [place] through her OB office  Prior to this she had 2 vaginal deliveries without complication and her largest baby had a 20 minutes 2nd stage and was completed with 3 pushes per her recollection  She thinks this baby weighed 8 pounds 9 ounces  Review of her records showed the baby weighed 8 pound 7 ounces and the delivery occurred in   3  GBS positive    Ultrasound findings: The ultrasound today shows a fetus that is measuring 1 week ahead of dates in the 85th percentile with an estimated weight of 7 pounds 4 ounces  The VANI is normal   The fetal abdomen is 2 5 centimeters larger than the fetal head  NST is reactive and reassuring      Pregnancy ultrasound has limitations and is unable to detect all forms of fetal congenital abnormalities  The inaccuracy in the EFW can be off by 1 lb either way in the third trimester  Follow up:  No further follow up ultrasounds are recommended at this time  Recommend weekly NST/fluid scans till delivery  She reports she is set up for induction at 39 weeks if delivery has not occurred       Finesse Geronimo MD

## 2020-07-06 NOTE — PATIENT INSTRUCTIONS
Nonstress Test for Pregnancy   WHAT YOU NEED TO KNOW:   What do I need to know about a nonstress test?  A nonstress test measures your baby's heart rate and movements  Nonstress means that no stress will be placed on your baby during the test    How do I prepare for a nonstress test?  Your healthcare provider will talk to you about how to prepare for this test  He may tell you to eat and drink plenty of fluids before your test  If you smoke, you may be asked not to smoke within 2 hours before the test  He will also tell you what medicines to take or not take on the day of your test    What will happen during a nonstress test?  You may be asked to lie down or recline back for the test on a bed  One or two belts with sensors will be placed around your abdomen  Your baby's heart rate will be recorded with a machine  If your baby does not move, your baby may be asleep  Your healthcare provider may make a noise near your abdomen to try to wake your baby  The test usually takes about 20 minutes, but can take longer if your baby needs to be awakened  What do I need to know about the test results? Your baby will be expected to move at least twice for a certain amount of time  Your baby's heart rate will be expected to go up by a certain number of beats per minute during movement  If your baby does not move as expected, the test may need to be repeated or you may need other tests  CARE AGREEMENT:   You have the right to help plan your care  Learn about your health condition and how it may be treated  Discuss treatment options with your caregivers to decide what care you want to receive  You always have the right to refuse treatment  The above information is an  only  It is not intended as medical advice for individual conditions or treatments  Talk to your doctor, nurse or pharmacist before following any medical regimen to see if it is safe and effective for you    © 2017 [employer]  Information is for End User's use only and may not be sold, redistributed or otherwise used for commercial purposes  All illustrations and images included in CareNotes® are the copyrighted property of A D A M , Inc  or Phil Hdez

## 2020-07-06 NOTE — PROGRESS NOTES
NST procedure and expected outcome explained to patient  Daily fetal kick count discussed with handout given  Patient verbalized understanding of all and was receptive      Juan Carlos Acevedo RN

## 2020-07-06 NOTE — PROGRESS NOTES
Bolivar Cortes has no complaints today  She reports regular fetal movements and denies problems related to hypertension,  labor, or vaginal bleeding  Her prenatal course has apparently been unremarkable in the interim since her most recent visit here  She reports that in her OB office the baby was measuring 5 weeks ahead by fundal height  She failed her Glucola with a value of 139 on  and has a 3 hour glucose tolerance test ordered  Problem list:  1  Increased BMI  2  Prior  delivery at 35 weeks and 6 days  Delivered stat by low-transverse  for cord prolapse  She is receiving [place] through her OB office  Prior to this she had 2 vaginal deliveries without complication and her largest baby had a 20 minutes 2nd stage and was completed with 3 pushes per her recollection  She thinks this baby weighed 8 pounds 9 ounces  Review of her records showed the baby weighed 8 pound 7 ounces and the delivery occurred in   3  GBS positive    Ultrasound findings: The ultrasound today shows a fetus that is measuring 1 week ahead of dates in the 85th percentile with an estimated weight of 7 pounds 4 ounces  The VANI is normal   The fetal abdomen is 2 5 centimeters larger than the fetal head  NST is reactive and reassuring  Pregnancy ultrasound has limitations and is unable to detect all forms of fetal congenital abnormalities  The inaccuracy in the EFW can be off by 1 lb either way in the third trimester  Follow up:  No further follow up ultrasounds are recommended at this time  Recommend weekly NST/fluid scans till delivery  She reports she is set up for induction at 39 weeks if delivery has not occurred       Juancho Doss MD

## 2020-07-08 ENCOUNTER — ROUTINE PRENATAL (OUTPATIENT)
Dept: OBGYN CLINIC | Facility: CLINIC | Age: 30
End: 2020-07-08

## 2020-07-08 VITALS
BODY MASS INDEX: 49.65 KG/M2 | SYSTOLIC BLOOD PRESSURE: 120 MMHG | HEIGHT: 61 IN | WEIGHT: 263 LBS | HEART RATE: 113 BPM | TEMPERATURE: 97.4 F | OXYGEN SATURATION: 98 % | DIASTOLIC BLOOD PRESSURE: 80 MMHG

## 2020-07-08 DIAGNOSIS — O34.219 HISTORY OF CESAREAN DELIVERY, ANTEPARTUM: ICD-10-CM

## 2020-07-08 DIAGNOSIS — O09.213 SUPERVISION OF PREGNANCY WITH HISTORY OF PRE-TERM LABOR IN THIRD TRIMESTER: ICD-10-CM

## 2020-07-08 DIAGNOSIS — O99.820 GROUP B STREPTOCOCCUS CARRIER, ANTEPARTUM: ICD-10-CM

## 2020-07-08 DIAGNOSIS — O99.519 ASTHMA AFFECTING PREGNANCY, ANTEPARTUM: Primary | ICD-10-CM

## 2020-07-08 DIAGNOSIS — O09.893 HISTORY OF PREMATURE DELIVERY, CURRENTLY PREGNANT, THIRD TRIMESTER: ICD-10-CM

## 2020-07-08 DIAGNOSIS — J45.909 ASTHMA AFFECTING PREGNANCY, ANTEPARTUM: Primary | ICD-10-CM

## 2020-07-08 DIAGNOSIS — O99.213 MATERNAL MORBID OBESITY, ANTEPARTUM, THIRD TRIMESTER (HCC): ICD-10-CM

## 2020-07-08 DIAGNOSIS — E66.01 MATERNAL MORBID OBESITY, ANTEPARTUM, THIRD TRIMESTER (HCC): ICD-10-CM

## 2020-07-08 PROCEDURE — PNV: Performed by: OBSTETRICS & GYNECOLOGY

## 2020-07-08 NOTE — PROGRESS NOTES
Pt is a 26 y o  [id number] 36w3d  Pregnancy is complicated by morbid obesity, h o C/S desires TOLAC, h o PTD, elevated 1 hour gtt and did not have 3 hour gtt done  Pt again advised of importance of this test to prevent macrosomia, higher degree vaginal lacerations and  hypoglycemia  Pt reports h o of a very large child causing 4th degree laceration, but still desires TOLAC knowing the risk of recurrence  GBS positive and reviewed with patient  Pt reports +FM  Denies vb, lof, ctx   Labor precautions  and fkc reviewed  To start  testing once weekly, will schedule before she leaves today  Has elective IOL scheduled on  at 0800

## 2020-07-13 ENCOUNTER — TELEPHONE (OUTPATIENT)
Dept: PERINATAL CARE | Facility: OTHER | Age: 30
End: 2020-07-13

## 2020-07-13 NOTE — TELEPHONE ENCOUNTER
Spoke with patient and confirmed appointment with [facility]  1 support person ( must be over age of 15) may accompany you for your appointment  Are you and your support person able to wear a mask without a valve during entire appointment? [facility] does not allow cell phone use, recording device or streaming during the ultrasound  Check in and rooming questions will be done via phone, when you arrive in the parking lot please call the following inside line # prior to entering office:    [facility] [phone]  [facility] [place]: [address] [facility] line:  [address] line: [phone]  [place] line:  [phone]  [place] line: [phone]    Have you or your support person traveled outside the state in the last 2 weeks? If yes, what state did you travel to? Do you or your support person have:  Fever or flu- like symptoms? Symptoms of upper respiratory infection like runny nose, sore throat or cough? Do you have new headache that you have not had in the past   Have you experienced any new shortness of breath recently? Do you have any new loss of taste or smell? Do you have any new diarrhea, nausea or vomiting? Have you recently been in contact with anyone who has been sick or diagnosed with COVID-19 infection? Have you been recommended to quarantine because of an exposure to a confirmed positive COVID19 person? You and your support person will be screened upon arrival     Patient verbalized understanding of all instructions   -------------------------------------------------------------    Attempted to reach patient by phone and left voicemail to confirm appointment for [facility] ultrasound  1 support person ( must be over the age of 15) may accompany you for your appointment  If you or your support person have traveled outside the state in the past 2 weeks, please call our office today #[phone]    You and your support person must wear a mask ,covering nose and mouth,during your entire visit  You and your support person will be screened upon arrival   IF not feeling well- cough, fever, shortness of breath or any flu like symptoms contact your primary care physician or 1-866-[address] [place] [place]  Please call our office prior to entering the building  Check in and rooming questions will be done via phone  Inside office # provided:  [facility] line: [phone]  [place] line:  [phone]  [facility] line:  [phone] line:  [phone]  [place] line:  [phone]  [place] line:  [phone]    [facility] does not allow cell phone use, recording device or streaming during ultrasound     Any questions with these instructions please call Maternal Fetal Medicine nurse line today @ # [phone]

## 2020-07-14 ENCOUNTER — ROUTINE PRENATAL (OUTPATIENT)
Dept: OBGYN CLINIC | Facility: CLINIC | Age: 30
End: 2020-07-14
Payer: COMMERCIAL

## 2020-07-14 ENCOUNTER — ULTRASOUND (OUTPATIENT)
Dept: OBGYN CLINIC | Facility: CLINIC | Age: 30
End: 2020-07-14
Payer: COMMERCIAL

## 2020-07-14 VITALS
DIASTOLIC BLOOD PRESSURE: 78 MMHG | TEMPERATURE: 97.6 F | HEIGHT: 61 IN | WEIGHT: 260.8 LBS | SYSTOLIC BLOOD PRESSURE: 122 MMHG | OXYGEN SATURATION: 99 % | HEART RATE: 105 BPM | BODY MASS INDEX: 49.24 KG/M2

## 2020-07-14 DIAGNOSIS — O09.893 HISTORY OF PREMATURE DELIVERY, CURRENTLY PREGNANT, THIRD TRIMESTER: ICD-10-CM

## 2020-07-14 DIAGNOSIS — O09.213 SUPERVISION OF PREGNANCY WITH HISTORY OF PRE-TERM LABOR IN THIRD TRIMESTER: ICD-10-CM

## 2020-07-14 DIAGNOSIS — O34.219 HISTORY OF CESAREAN DELIVERY, ANTEPARTUM: ICD-10-CM

## 2020-07-14 DIAGNOSIS — O99.213 MATERNAL MORBID OBESITY, ANTEPARTUM, THIRD TRIMESTER (HCC): ICD-10-CM

## 2020-07-14 DIAGNOSIS — O99.519 ASTHMA AFFECTING PREGNANCY, ANTEPARTUM: Primary | ICD-10-CM

## 2020-07-14 DIAGNOSIS — R73.09 ELEVATED GLUCOSE TOLERANCE TEST: ICD-10-CM

## 2020-07-14 DIAGNOSIS — E66.01 MATERNAL MORBID OBESITY, ANTEPARTUM, THIRD TRIMESTER (HCC): ICD-10-CM

## 2020-07-14 DIAGNOSIS — O40.3XX0 POLYHYDRAMNIOS IN THIRD TRIMESTER COMPLICATION, SINGLE OR UNSPECIFIED FETUS: ICD-10-CM

## 2020-07-14 DIAGNOSIS — O99.820 GROUP B STREPTOCOCCUS CARRIER, ANTEPARTUM: ICD-10-CM

## 2020-07-14 DIAGNOSIS — J45.909 ASTHMA AFFECTING PREGNANCY, ANTEPARTUM: Primary | ICD-10-CM

## 2020-07-14 PROCEDURE — 76815 OB US LIMITED FETUS(S): CPT | Performed by: OBSTETRICS & GYNECOLOGY

## 2020-07-14 PROCEDURE — 76819 FETAL BIOPHYS PROFIL W/O NST: CPT | Performed by: OBSTETRICS & GYNECOLOGY

## 2020-07-14 PROCEDURE — PNV: Performed by: OBSTETRICS & GYNECOLOGY

## 2020-07-14 NOTE — PROGRESS NOTES
SV PAY=241 BPM  VERTEX  Fundal II Plac    Movement=8  2  Tone=6  2  Breathing=10+ 2  Fluid-LVP=9 6cm 2-VANI=24 8cm       POLY    Total BPP  8

## 2020-07-14 NOTE — PROGRESS NOTES
Pt is a 26 y o  [id number] 37w2d  Pregnancy is complicated by Morbid obesity, h o c/s desires TOLAC, h o PTD, elevated 1 hour gtt and did not complete 3 hour gtt, GBS carrier, and new onset polyhydramnios noted on BPP today  Pt reports she will have 3 hour GTT tomorrow  Pt reports +FM  Denies vb, lof, ctx   Labor precautions and fkc reviewed  BPP 8/8 VANI 24 8, LVP 9 6  F/u 1 week

## 2020-07-18 ENCOUNTER — TELEPHONE (OUTPATIENT)
Dept: OTHER | Facility: OTHER | Age: 30
End: 2020-07-18

## 2020-07-18 ENCOUNTER — HOSPITAL ENCOUNTER (INPATIENT)
Facility: HOSPITAL | Age: 30
LOS: 2 days | Discharge: HOME | End: 2020-07-20
Attending: OBSTETRICS & GYNECOLOGY | Admitting: OBSTETRICS & GYNECOLOGY
Payer: COMMERCIAL

## 2020-07-18 DIAGNOSIS — Z3A.37 37 WEEKS GESTATION OF PREGNANCY: ICD-10-CM

## 2020-07-18 DIAGNOSIS — O34.219 VBAC (VAGINAL BIRTH AFTER CESAREAN): Primary | ICD-10-CM

## 2020-07-18 LAB
ABO GROUP BLD: NORMAL
BLD GP AB SCN SERPL QL: NEGATIVE
ERYTHROCYTE [DISTWIDTH] IN BLOOD BY AUTOMATED COUNT: 13.4 % (ref 11.6–15.1)
GLUCOSE SERPL-MCNC: 84 MG/DL (ref 65–140)
HCT VFR BLD AUTO: 33.6 % (ref 34.8–46.1)
HGB BLD-MCNC: 10.8 G/DL (ref 11.5–15.4)
MCH RBC QN AUTO: 28.2 PG (ref 26.8–34.3)
MCHC RBC AUTO-ENTMCNC: 32.1 G/DL (ref 31.4–37.4)
MCV RBC AUTO: 88 FL (ref 82–98)
PLATELET # BLD AUTO: 207 THOUSANDS/UL (ref 149–390)
PMV BLD AUTO: 11.2 FL (ref 8.9–12.7)
RBC # BLD AUTO: 3.83 MILLION/UL (ref 3.81–5.12)
RH BLD: POSITIVE
SPECIMEN EXPIRATION DATE: NORMAL
WBC # BLD AUTO: 12.43 THOUSAND/UL (ref 4.31–10.16)

## 2020-07-18 PROCEDURE — 86850 RBC ANTIBODY SCREEN: CPT | Performed by: OBSTETRICS & GYNECOLOGY

## 2020-07-18 PROCEDURE — 82948 REAGENT STRIP/BLOOD GLUCOSE: CPT

## 2020-07-18 PROCEDURE — 86592 SYPHILIS TEST NON-TREP QUAL: CPT | Performed by: OBSTETRICS & GYNECOLOGY

## 2020-07-18 PROCEDURE — 99213 OFFICE O/P EST LOW 20 MIN: CPT

## 2020-07-18 PROCEDURE — NC001 PR NO CHARGE: Performed by: OBSTETRICS & GYNECOLOGY

## 2020-07-18 PROCEDURE — 86901 BLOOD TYPING SEROLOGIC RH(D): CPT | Performed by: OBSTETRICS & GYNECOLOGY

## 2020-07-18 PROCEDURE — 86900 BLOOD TYPING SEROLOGIC ABO: CPT | Performed by: OBSTETRICS & GYNECOLOGY

## 2020-07-18 PROCEDURE — 85027 COMPLETE CBC AUTOMATED: CPT | Performed by: OBSTETRICS & GYNECOLOGY

## 2020-07-18 RX ORDER — FLUTICASONE FUROATE AND VILANTEROL 100; 25 UG/1; UG/1
1 POWDER RESPIRATORY (INHALATION) DAILY
Status: DISCONTINUED | OUTPATIENT
Start: 2020-07-19 | End: 2020-07-20 | Stop reason: HOSPADM

## 2020-07-18 RX ORDER — FLUCONAZOLE 150 MG/1
150 TABLET ORAL ONCE
Status: COMPLETED | OUTPATIENT
Start: 2020-07-18 | End: 2020-07-18

## 2020-07-18 RX ORDER — SODIUM CHLORIDE, SODIUM LACTATE, POTASSIUM CHLORIDE, CALCIUM CHLORIDE 600; 310; 30; 20 MG/100ML; MG/100ML; MG/100ML; MG/100ML
125 INJECTION, SOLUTION INTRAVENOUS CONTINUOUS
Status: DISCONTINUED | OUTPATIENT
Start: 2020-07-18 | End: 2020-07-19

## 2020-07-18 RX ORDER — ALBUTEROL SULFATE 90 UG/1
2 AEROSOL, METERED RESPIRATORY (INHALATION) EVERY 6 HOURS PRN
Status: DISCONTINUED | OUTPATIENT
Start: 2020-07-18 | End: 2020-07-20 | Stop reason: HOSPADM

## 2020-07-18 RX ADMIN — SODIUM CHLORIDE, SODIUM LACTATE, POTASSIUM CHLORIDE, AND CALCIUM CHLORIDE 125 ML/HR: .6; .31; .03; .02 INJECTION, SOLUTION INTRAVENOUS at 22:03

## 2020-07-18 RX ADMIN — FLUCONAZOLE 150 MG: 150 TABLET ORAL at 22:29

## 2020-07-18 RX ADMIN — SODIUM CHLORIDE 5 MILLION UNITS: 0.9 INJECTION, SOLUTION INTRAVENOUS at 22:17

## 2020-07-18 NOTE — TELEPHONE ENCOUNTER
38wks/ having contractions 5-6 mins apart and lasting about a minute each   Just wanted to know if they should go to the hospital      Paged Dr Sarahi Campbell via [facility]

## 2020-07-19 ENCOUNTER — ANESTHESIA (INPATIENT)
Dept: ANESTHESIOLOGY | Facility: HOSPITAL | Age: 30
End: 2020-07-19
Payer: COMMERCIAL

## 2020-07-19 ENCOUNTER — ANESTHESIA EVENT (INPATIENT)
Dept: ANESTHESIOLOGY | Facility: HOSPITAL | Age: 30
End: 2020-07-19
Payer: COMMERCIAL

## 2020-07-19 PROBLEM — O34.219 VBAC (VAGINAL BIRTH AFTER CESAREAN): Status: ACTIVE | Noted: 2020-07-19

## 2020-07-19 LAB
BASE EXCESS BLDCOA CALC-SCNC: -4.4 MMOL/L (ref 3–11)
BASE EXCESS BLDCOV CALC-SCNC: -3.8 MMOL/L (ref 1–9)
HCO3 BLDCOA-SCNC: 21.1 MMOL/L (ref 17.3–27.3)
HCO3 BLDCOV-SCNC: 19.5 MMOL/L (ref 12.2–28.6)
O2 CT VFR BLDCOA CALC: 9.3 ML/DL
OXYHGB MFR BLDCOA: 47.9 %
OXYHGB MFR BLDCOV: 79.4 %
PCO2 BLDCOA: 40.2 MM[HG] (ref 30–60)
PCO2 BLDCOV: 30.5 MM HG (ref 27–43)
PH BLDCOA: 7.34 [PH] (ref 7.23–7.43)
PH BLDCOV: 7.42 [PH] (ref 7.19–7.49)
PO2 BLDCOA: 21.4 MM HG (ref 5–25)
PO2 BLDCOV: 34.1 MM HG (ref 15–45)
SAO2 % BLDCOV: 15.8 ML/DL

## 2020-07-19 PROCEDURE — 4A1HXCZ MONITORING OF PRODUCTS OF CONCEPTION, CARDIAC RATE, EXTERNAL APPROACH: ICD-10-PCS | Performed by: OBSTETRICS & GYNECOLOGY

## 2020-07-19 PROCEDURE — 88307 TISSUE EXAM BY PATHOLOGIST: CPT | Performed by: PATHOLOGY

## 2020-07-19 PROCEDURE — 10907ZC DRAINAGE OF AMNIOTIC FLUID, THERAPEUTIC FROM PRODUCTS OF CONCEPTION, VIA NATURAL OR ARTIFICIAL OPENING: ICD-10-PCS | Performed by: OBSTETRICS & GYNECOLOGY

## 2020-07-19 PROCEDURE — 0HQ9XZZ REPAIR PERINEUM SKIN, EXTERNAL APPROACH: ICD-10-PCS | Performed by: OBSTETRICS & GYNECOLOGY

## 2020-07-19 PROCEDURE — 82805 BLOOD GASES W/O2 SATURATION: CPT | Performed by: OBSTETRICS & GYNECOLOGY

## 2020-07-19 PROCEDURE — 99024 POSTOP FOLLOW-UP VISIT: CPT | Performed by: OBSTETRICS & GYNECOLOGY

## 2020-07-19 RX ORDER — DIPHENHYDRAMINE HCL 25 MG
25 TABLET ORAL EVERY 6 HOURS PRN
Status: DISCONTINUED | OUTPATIENT
Start: 2020-07-19 | End: 2020-07-20 | Stop reason: HOSPADM

## 2020-07-19 RX ORDER — SIMETHICONE 80 MG
80 TABLET,CHEWABLE ORAL 4 TIMES DAILY PRN
Status: DISCONTINUED | OUTPATIENT
Start: 2020-07-19 | End: 2020-07-20 | Stop reason: HOSPADM

## 2020-07-19 RX ORDER — DIAPER,BRIEF,INFANT-TODD,DISP
1 EACH MISCELLANEOUS 4 TIMES DAILY PRN
Status: DISCONTINUED | OUTPATIENT
Start: 2020-07-19 | End: 2020-07-20 | Stop reason: HOSPADM

## 2020-07-19 RX ORDER — DOCUSATE SODIUM 100 MG/1
100 CAPSULE, LIQUID FILLED ORAL 2 TIMES DAILY
Status: DISCONTINUED | OUTPATIENT
Start: 2020-07-19 | End: 2020-07-20 | Stop reason: HOSPADM

## 2020-07-19 RX ORDER — ROPIVACAINE HYDROCHLORIDE 2 MG/ML
INJECTION, SOLUTION EPIDURAL; INFILTRATION; PERINEURAL
Status: COMPLETED | OUTPATIENT
Start: 2020-07-19 | End: 2020-07-19

## 2020-07-19 RX ORDER — CALCIUM CARBONATE 200(500)MG
500 TABLET,CHEWABLE ORAL DAILY PRN
Status: DISCONTINUED | OUTPATIENT
Start: 2020-07-19 | End: 2020-07-20 | Stop reason: HOSPADM

## 2020-07-19 RX ORDER — ACETAMINOPHEN 325 MG/1
650 TABLET ORAL EVERY 6 HOURS PRN
Status: DISCONTINUED | OUTPATIENT
Start: 2020-07-19 | End: 2020-07-20 | Stop reason: HOSPADM

## 2020-07-19 RX ORDER — ROPIVACAINE HYDROCHLORIDE 2 MG/ML
INJECTION, SOLUTION EPIDURAL; INFILTRATION; PERINEURAL AS NEEDED
Status: DISCONTINUED | OUTPATIENT
Start: 2020-07-19 | End: 2020-07-19 | Stop reason: SURG

## 2020-07-19 RX ORDER — IBUPROFEN 600 MG/1
600 TABLET ORAL EVERY 6 HOURS PRN
Status: DISCONTINUED | OUTPATIENT
Start: 2020-07-19 | End: 2020-07-20 | Stop reason: HOSPADM

## 2020-07-19 RX ORDER — CEFAZOLIN SODIUM 2 G/50ML
2000 SOLUTION INTRAVENOUS ONCE
Status: COMPLETED | OUTPATIENT
Start: 2020-07-19 | End: 2020-07-19

## 2020-07-19 RX ORDER — SODIUM CHLORIDE, SODIUM LACTATE, POTASSIUM CHLORIDE, CALCIUM CHLORIDE 600; 310; 30; 20 MG/100ML; MG/100ML; MG/100ML; MG/100ML
100 INJECTION, SOLUTION INTRAVENOUS CONTINUOUS
Status: DISCONTINUED | OUTPATIENT
Start: 2020-07-19 | End: 2020-07-19

## 2020-07-19 RX ORDER — OXYTOCIN/RINGER'S LACTATE 30/500 ML
1-30 PLASTIC BAG, INJECTION (ML) INTRAVENOUS
Status: DISCONTINUED | OUTPATIENT
Start: 2020-07-19 | End: 2020-07-19

## 2020-07-19 RX ORDER — METOCLOPRAMIDE HYDROCHLORIDE 5 MG/ML
10 INJECTION INTRAMUSCULAR; INTRAVENOUS EVERY 6 HOURS PRN
Status: DISCONTINUED | OUTPATIENT
Start: 2020-07-19 | End: 2020-07-20 | Stop reason: HOSPADM

## 2020-07-19 RX ORDER — ROPIVACAINE HYDROCHLORIDE 2 MG/ML
INJECTION, SOLUTION EPIDURAL; INFILTRATION; PERINEURAL
Status: COMPLETED
Start: 2020-07-19 | End: 2020-07-19

## 2020-07-19 RX ADMIN — ROPIVACAINE HYDROCHLORIDE 5 ML: 2 INJECTION, SOLUTION EPIDURAL; INFILTRATION at 13:14

## 2020-07-19 RX ADMIN — ROPIVACAINE HYDROCHLORIDE: 2 INJECTION, SOLUTION EPIDURAL; INFILTRATION at 00:45

## 2020-07-19 RX ADMIN — ROPIVACAINE HYDROCHLORIDE: 2 INJECTION, SOLUTION EPIDURAL; INFILTRATION at 14:27

## 2020-07-19 RX ADMIN — SODIUM CHLORIDE 2.5 MILLION UNITS: 9 INJECTION, SOLUTION INTRAVENOUS at 10:45

## 2020-07-19 RX ADMIN — SODIUM CHLORIDE 2.5 MILLION UNITS: 9 INJECTION, SOLUTION INTRAVENOUS at 02:15

## 2020-07-19 RX ADMIN — SODIUM CHLORIDE, SODIUM LACTATE, POTASSIUM CHLORIDE, AND CALCIUM CHLORIDE 300 ML: .6; .31; .03; .02 INJECTION, SOLUTION INTRAVENOUS at 11:45

## 2020-07-19 RX ADMIN — SODIUM CHLORIDE, SODIUM LACTATE, POTASSIUM CHLORIDE, AND CALCIUM CHLORIDE 125 ML/HR: .6; .31; .03; .02 INJECTION, SOLUTION INTRAVENOUS at 04:26

## 2020-07-19 RX ADMIN — SODIUM CHLORIDE, SODIUM LACTATE, POTASSIUM CHLORIDE, AND CALCIUM CHLORIDE 125 ML/HR: .6; .31; .03; .02 INJECTION, SOLUTION INTRAVENOUS at 01:45

## 2020-07-19 RX ADMIN — SODIUM CHLORIDE 2.5 MILLION UNITS: 9 INJECTION, SOLUTION INTRAVENOUS at 06:05

## 2020-07-19 RX ADMIN — BENZOCAINE AND LEVOMENTHOL 1 APPLICATION: 200; 5 SPRAY TOPICAL at 18:21

## 2020-07-19 RX ADMIN — DOCUSATE SODIUM 100 MG: 100 CAPSULE, LIQUID FILLED ORAL at 18:22

## 2020-07-19 RX ADMIN — SODIUM CHLORIDE 2.5 MILLION UNITS: 9 INJECTION, SOLUTION INTRAVENOUS at 14:27

## 2020-07-19 RX ADMIN — Medication 2 MILLI-UNITS/MIN: at 05:57

## 2020-07-19 RX ADMIN — CALCIUM CARBONATE (ANTACID) CHEW TAB 500 MG 500 MG: 500 CHEW TAB at 03:18

## 2020-07-19 RX ADMIN — FLUTICASONE FUROATE AND VILANTEROL TRIFENATATE 1 PUFF: 100; 25 POWDER RESPIRATORY (INHALATION) at 10:47

## 2020-07-19 RX ADMIN — IBUPROFEN 600 MG: 600 TABLET ORAL at 19:18

## 2020-07-19 RX ADMIN — ROPIVACAINE HYDROCHLORIDE 10 ML: 2 INJECTION, SOLUTION EPIDURAL; INFILTRATION at 00:49

## 2020-07-19 RX ADMIN — ROPIVACAINE HYDROCHLORIDE 5 ML: 2 INJECTION, SOLUTION EPIDURAL; INFILTRATION at 13:15

## 2020-07-19 RX ADMIN — CEFAZOLIN SODIUM 2000 MG: 2 SOLUTION INTRAVENOUS at 17:06

## 2020-07-19 RX ADMIN — SODIUM CHLORIDE, SODIUM LACTATE, POTASSIUM CHLORIDE, AND CALCIUM CHLORIDE 999 ML/HR: .6; .31; .03; .02 INJECTION, SOLUTION INTRAVENOUS at 00:40

## 2020-07-19 RX ADMIN — WITCH HAZEL 1 PAD: 500 SOLUTION RECTAL; TOPICAL at 18:21

## 2020-07-19 NOTE — ANESTHESIA PROCEDURE NOTES
Epidural Block    Patient location during procedure: OB  Start time: 7/19/2020 12:41 AM  Reason for block: at surgeon's request and primary anesthetic  Staffing  Anesthesiologist: Gordon Cho MD  Performed: anesthesiologist   Preanesthetic Checklist  Completed: patient identified, site marked, surgical consent, pre-op evaluation, timeout performed, IV checked, risks and benefits discussed and monitors and equipment checked  Epidural  Patient position: sitting  Prep: Betadine  Patient monitoring: frequent blood pressure checks  Approach: midline  Location: lumbar (1-5)  Injection technique: BHAVIN saline  Needle  Needle type: Tuohy   Needle gauge: 18 G  Catheter type: side hole  Catheter size: 20 G  Test dose: negativeropivacaine (NAROPIN) 0 2% epidural injection, 10 mL  Assessment  Sensory level: [id number] aspiration for CSF, negative aspiration for heme and no paresthesia on injection  patient tolerated the procedure well with no immediate complications

## 2020-07-19 NOTE — DISCHARGE SUMMARY
Discharge Summary - OB/GYN  Anu Gauthier 26 y o  female MRN: [id number]  Unit/Bed#: L&D 326-01 Encounter: [id number]    Admission Date: 2020     Discharge Date: 20    Admitting Attending: Renu Dietz MD    Delivering Attending: Dr Jean Chicas    Discharging Attending: Dr Carlos Dan    Principal Diagnosis: Pregnancy at 38w0d    Secondary Diagnosis:   1  History of  section  2  Asthma    Procedures: vaginal birth after  ()    Anesthesia: epidural    Hospital course: Anu Gauthier is a 26 y o  [id number] at 38w0d who was initially admitted for labor with a desire to [facility]  Her labor was augmented with pitocin and she received an epidural for analgesia  She was artificially ruptured for clear fluid  An IUPC was placed and she received an amnioinfusion  She delivered a viable male  on 2020 at 0  Weight 8lbs 3oz via normal spontaneous vaginal delivery  She sustained a first degree laceration during delivery which was adequately repaired  She required manual extraction of the placenta, and received Ancef  Apgars were 8 (1 min) and 9 (5 min)   was transferred to  nursery  Patient tolerated the procedure well  Her post-delivery course was uncomplicated  Her postpartum pain was well controlled with oral analgesics  On day of discharge, she was ambulating and able to reasonably perform all ADLs  She was voiding and had appropriate bowel function  Pain was well controlled  She was discharged home on postpartum day #1 without complications  Patient was instructed to follow up with her OB as an outpatient and was given appropriate warnings to call provider if she develops signs of infection or uncontrolled pain  Complications: none apparent    Condition at discharge: good     Discharge instructions/Information to patient and family:   See after visit summary for information provided to patient and family        Provisions for Follow-Up Care:  See after visit summary for information related to follow-up care and any pertinent home health orders  Disposition: See After Visit Summary for discharge disposition information  Planned Readmission: No    Discharge medications and instructions:   Discharge instructions/Information to patient and family:   -Do not place anything (no partner, tampons or douche) in your vagina for 6 weeks  -You may walk for exercise for the first 6 weeks then gradually return to your usual activities    -Please do not drive for 1 week if you have no stitches and for 2 weeks if you have stitches or underwent a  delivery     -You may take baths or shower per your preference    -Please look at your bust (breasts) in the mirror daily and call for redness or tenderness or increased warmth    -Please call us for temperature > 100 4*F or 38* C, worsening pain or a foul discharge  Discharge Medications:   Prenatal vitamin daily for 6 months or the duration of nursing whichever is longer    Motrin 600 mg orally every 6 hours as needed for pain  Tylenol (over the counter) per bottle directions as needed for pain: do NOT use with percocet  Hydrocortisone cream 1% (over the counter) applied 1-2x daily to hemorrhoids as needed

## 2020-07-19 NOTE — H&P
History & Physical - OB/GYN   Arlin Matthew 26 y o  female MRN: [id number]  Unit/Bed#: L&D 326-01 Encounter: [id number]    26 y o  [id number] at 37w6d weeks by ultrasound  She is a patient of Dr Loc Mares    Chief complaint:  contractions    HPI:  Contractions:  yes  Fetal movement:  yes  Vaginal bleeding:   no  Leaking of fluid:  possible    Arlin Matthew is a 26 y o  [id number] female at 41w10d who presents with regular contractions for the last few days, now stronger today  Passed mucus plug a few days ago  Denies VB  Has noted possible leakage of fluid with contractions  Denies feeling a big gush at any point  Notes fluid appears yellow, thinks it may just be urine  Denies vaginal itching, irritation  Denies abnormal discharge  Last episode of intercourse was 2-3 weeks ago  Denies recent trauma, falls  Pregnancy history notable for:  - BMI 49  - Elevated 1 hr GTT; did not yet complete 3 hour GTT   - Hx PCOS  - Hx tSVD x 2  - Hx spontaneous PTL at 25 weeks in  resulting in C/S 2/2 cord prolapse  - Was on [place] this pregnancy   - Desires TOLAC for this pregnancy   - Hx Asthma   - Hx influenza in 2020     She is GBS positive  She is not allergic to PCN  She is Rh positive  She denies history of DVT  She would like an epidural for labor       Pregnancy Complications:  Patient Active Problem List   Diagnosis    Eczema, dyshidrotic    Acne    Carpal tunnel syndrome    Fracture, tooth    Irregular menstrual cycle    Overweight    Polycystic ovaries    Vitamin D deficiency    Supervision of pregnancy with history of pre-term labor in third trimester    BMI 40 0-44 9, adult ([facility] [address] )    History of  section    Maternal morbid obesity, antepartum, third trimester ([facility] [address] )    History of premature delivery, currently pregnant, third trimester    Exposure to SARS-associated coronavirus    Asthma affecting pregnancy, antepartum    History of  delivery, antepartum    Elevated glucose tolerance test    Group B Streptococcus carrier, antepartum       PMH:  Past Medical History:   Diagnosis Date    Asthma     Female infertility     PCOS related    Obesity, morbid, BMI 40 0-49 9 (HCC)     Polycystic ovary syndrome     Varicella        OB Hx:  OB History    Para Term  AB Living   5 3 2 1 1 3   SAB TAB Ectopic Multiple Live Births   1 0 0 0 3      # Outcome Date GA Lbr Deni/2nd Weight Sex Delivery Anes PTL Lv   5 Current            4 SAB 2019 6w0d    SAB      3  10/23/12 35w6d  2807 g (6 lb 3 oz) M CS-LTranv EPI Y SUE      Birth Comments: prolapsed cord, emerg c/s      Complications:  delivery, Delivery by emergency       Name: Misael Wood   2 Term 08 38w0d  3657 g (8 lb 1 oz) M  EPI N SUE      Name: Kuldip Blas   1 Term 06 39w0d  3657 g (8 lb 1 oz) F  EPI N SUE      Name: [facility]      Obstetric Comments   Vaginal birth x 2    x 1       PSH:  Past Surgical History:   Procedure Laterality Date     SECTION      x1       Social Hx:  Denies history of tobacco, alcohol, or other drug use  Meds:  No current facility-administered medications on file prior to encounter  Current Outpatient Medications on File Prior to Encounter   Medication Sig Dispense Refill    ADVAIR DISKUS 100-50 MCG/DOSE inhaler INHALE 1 PUFF 2 (TWO) TIMES A DAY RINSE MOUTH AFTER USE   60 each 1    albuterol (PROAIR HFA) 90 mcg/act inhaler Inhale 2 puffs every 6 (six) hours as needed for wheezing 8 5 g 0    Prenatal Vit-Fe Fumarate-FA (PRENATAL VITAMIN PO) Take by mouth         Allergies:  No Known Allergies    Labs:  Blood type: O+  Antibody: negative  Group B strep: positive  HIV: negative  Hepatitis B: negative  RPR: negative  Rubella: Immune  Varicella Unknown  1 hour Glucose: 139  3 hour Glucose: not yet completed     Physical Exam:  /70 (BP Location: Left arm)   Pulse (!) 110   Temp 97 7 °F (36 5 °C) (Oral)   Resp 20   Ht 5' 1" (1 549 m)   Wt 118 kg (260 lb)   LMP 2019   BMI 49 13 kg/m²   Gen: NAD, cooperative, pleasant, appears uncomfortable   Cardio: RRR, S1/S2 normal   Lungs: good effort, CTAB  Abdomen: soft, nontender  Gravid uterus  Body mass index is 49 13 kg/m²  Estimated Fetal Weight: 3296 grams - 7 lbs 4 oz  (85%) on 2020  Presentation: cephalic  SVE: /11/-6 --> 5//-2  FHT:  140bpm / Moderate 6 - 25 bpm / accelerations present, no decelerations   [place]: q4-5min  Membranes: intact on admission    - negative pooling, ferning, nitrazine   - membranes palpable on cervical exam   Speculum exam: notable for thick, clumpy, yellow-green discharge throughout the vaginal vault  Small amount of fluid-like discharge also noted  Assessment:   26 y o  [id number] at 37w6d weeks, pregnancy notable for obesity, hx PTD, hx C/S desiring TOLAC, hx asthma; now with newly diagnosed yeast infection and regular contractions with associated cervical change  Admit for labor  Plan:   - Admit to L&D; anticipate   - Expectant management for now as she is 37 weeks  - Yeast infection: will treat with 150mg Diflucan PO once; if symptoms severe or persistent, can consider repeat dosing during admission  - CBC, RPR, type and screen  - GBS positive: PCN for GBS prophylaxis   - Rh positive   - Elevated 1 hr GTT, 3 hr GTT not yet completed: Will obtain fingerstick glucose on admission  - IVF: LR 125cc/hr  - Clear liquid diet   - Epidural upon request    Discussed with Dr Jf Stover MD  PGY-2, OB/GYN  2020 9:23 PM

## 2020-07-19 NOTE — OB LABOR/OXYTOCIN SAFETY PROGRESS
Labor Progress Note - Lori Lea 26 y o  female MRN: [id number]    Unit/Bed#: L&D 326-01 Encounter: [id number]       Contraction Frequency (minutes): 4  Contraction Quality: Mild  Tachysystole: No   Dilation: 6        Effacement (%): 70  Station: -2  Baseline Rate: 145 bpm  Fetal Heart Rate: 150 BPM  FHR Category: Category I             Notes/comments:   Pt comfortable with epidural  Feeling some cramping pain with contractions but tolerating them  FHT has been notable for a recent period of minimal variability  SVE 6/70/-2 to -3, fetal head not completely engaged against the cervix  Scalp stimulation performed  Continue expectant management  FHT improved to moderate variability  Will try peanut ball      Geraldo Banks MD 7/19/2020 3:12 AM

## 2020-07-19 NOTE — ANESTHESIA POSTPROCEDURE EVALUATION
Post-Op Assessment Note    CV Status:  Stable  Pain Score: 0    Pain management: adequate     Mental Status:  Alert and awake   Hydration Status:  Euvolemic and stable   PONV Controlled:  Controlled   Airway Patency:  Patent   Post Op Vitals Reviewed: Yes      Staff: Anesthesiologist     Post-op block assessment: site cleaned and catheter intact        BP      Temp      Pulse    Resp      SpO2

## 2020-07-19 NOTE — OB LABOR/OXYTOCIN SAFETY PROGRESS
Oxytocin Safety Progress Check Note - Didi Ching 26 y o  female MRN: [id number]    Unit/Bed#: L&D 326-01 Encounter: [id number]    Dose (ranulfo-units/min) Oxytocin: 20 ranulfo-units/min  Contraction Frequency (minutes): 2-4  Contraction Quality: Strong  Tachysystole: No   Dilation: Lip/rim (Comment)        Effacement (%): 100  Station: 1  Baseline Rate: 140 bpm  Fetal Heart Rate: 150 BPM  FHR Category: Category II             Notes/comments:   Patient feeling rectal pressure, SVE with small, reducible anterior lip  FHT with occasional small variable decelerations, minimal to moderate variability   Will begin Daxa Kiran MD 7/19/2020 3:32 PM

## 2020-07-19 NOTE — ANESTHESIA PREPROCEDURE EVALUATION
Review of Systems/Medical History          Cardiovascular  Negative cardio ROS    Pulmonary  Asthma ,        GI/Hepatic            Endo/Other    Obesity  morbid obesity   GYN  Currently pregnant (prior c section) ,          Hematology  Negative hematology ROS      Musculoskeletal  Negative musculoskeletal ROS        Neurology  Negative neurology ROS      Psychology           Physical Exam    Airway    Mallampati score: I  TM Distance: >3 FB  Neck ROM: full     Dental   No notable dental hx     Cardiovascular  Comment: Negative ROS, Rhythm: regular, Rate: normal, Cardiovascular exam normal    Pulmonary  Pulmonary exam normal     Other Findings        Anesthesia Plan  ASA Score- 3     Anesthesia Type- epidural with ASA Monitors  Additional Monitors:   Airway Plan:         Plan Factors-    Induction-     Postoperative Plan-     Informed Consent- Anesthetic plan and risks discussed with patient

## 2020-07-19 NOTE — OB LABOR/OXYTOCIN SAFETY PROGRESS
Labor Progress Note - Alondra Fermin 26 y o  female MRN: [id number]    Unit/Bed#: L&D 326-01 Encounter: [id number]       Contraction Frequency (minutes): 2-4  Contraction Quality: Mild  Tachysystole: No   Dilation: 5-6        Effacement (%): 70  Station: -3  Baseline Rate: 145 bpm  Fetal Heart Rate: 150 BPM  FHR Category: Category I             Notes/comments:   Pt feeling more pain with contractions  SVE 5-6/70/-3  Ctx difficult to trace, were previously q3-5min  Pt requesting epidural  Will notify anesthesia         Brianna Hanna MD 7/18/2020 11:41 PM

## 2020-07-19 NOTE — PLAN OF CARE
Problem: PAIN - ADULT  Goal: Verbalizes/displays adequate comfort level or baseline comfort level  Description  Interventions:  - Encourage patient to monitor pain and request assistance  - Assess pain using appropriate pain scale  - Administer analgesics based on type and severity of pain and evaluate response  - Implement non-pharmacological measures as appropriate and evaluate response  - Consider cultural and social influences on pain and pain management  - Notify physician/advanced practitioner if interventions unsuccessful or patient reports new pain  Outcome: Progressing     Problem: INFECTION - ADULT  Goal: Absence or prevention of progression during hospitalization  Description  INTERVENTIONS:  - Assess and monitor for signs and symptoms of infection  - Monitor lab/diagnostic results  - Monitor all insertion sites, i e  indwelling lines, tubes, and drains  - Monitor endotracheal if appropriate and nasal secretions for changes in amount and color  - [place] appropriate cooling/warming therapies per order  - Administer medications as ordered  - Instruct and encourage patient and family to use good hand hygiene technique  - Identify and instruct in appropriate isolation precautions for identified infection/condition  Outcome: Progressing  Goal: Absence of fever/infection during neutropenic period  Description  INTERVENTIONS:  - Monitor WBC    Outcome: Progressing     Problem: SAFETY ADULT  Goal: Patient will remain free of falls  Description  INTERVENTIONS:  - Assess patient frequently for physical needs  -  Identify cognitive and physical deficits and behaviors that affect risk of falls    -  [place] fall precautions as indicated by assessment   - Educate patient/family on patient safety including physical limitations  - Instruct patient to call for assistance with activity based on assessment  - Modify environment to reduce risk of injury  - Consider OT/PT consult to assist with strengthening/mobility  Outcome: Progressing  Goal: Maintain or return to baseline ADL function  Description  INTERVENTIONS:  -  Assess patient's ability to carry out ADLs; assess patient's baseline for ADL function and identify physical deficits which impact ability to perform ADLs (bathing, care of mouth/teeth, toileting, grooming, dressing, etc )  - Assess/evaluate cause of self-care deficits   - Assess range of motion  - Assess patient's mobility; develop plan if impaired  - Assess patient's need for assistive devices and provide as appropriate  - Encourage maximum independence but intervene and supervise when necessary  - Involve family in performance of ADLs  - Assess for home care needs following discharge   - Consider OT consult to assist with ADL evaluation and planning for discharge  - Provide patient education as appropriate  Outcome: Progressing  Goal: Maintain or return mobility status to optimal level  Description  INTERVENTIONS:  - Assess patient's baseline mobility status (ambulation, transfers, stairs, etc )    - Identify cognitive and physical deficits and behaviors that affect mobility  - Identify mobility aids required to assist with transfers and/or ambulation (gait belt, sit-to-stand, lift, walker, cane, etc )  - [place] fall precautions as indicated by assessment  - Record patient progress and toleration of activity level on Mobility SBAR; progress patient to next Phase/Stage  - Instruct patient to call for assistance with activity based on assessment  - Consider rehabilitation consult to assist with strengthening/weightbearing, etc   Outcome: Progressing     Problem: Knowledge Deficit  Goal: Patient/family/caregiver demonstrates understanding of disease process, treatment plan, medications, and discharge instructions  Description  Complete learning assessment and assess knowledge base    Interventions:  - Provide teaching at level of understanding  - Provide teaching via preferred learning methods  Outcome: Progressing     Problem: DISCHARGE PLANNING  Goal: Discharge to home or other facility with appropriate resources  Description  INTERVENTIONS:  - Identify barriers to discharge w/patient and caregiver  - Arrange for needed discharge resources and transportation as appropriate  - Identify discharge learning needs (meds, wound care, etc )  - Arrange for interpretive services to assist at discharge as needed  - Refer to Case Management Department for coordinating discharge planning if the patient needs post-hospital services based on physician/advanced practitioner order or complex needs related to functional status, cognitive ability, or social support system  Outcome: Progressing     Problem: BIRTH - VAGINAL/ SECTION  Goal: Fetal and maternal status remain reassuring during the birth process  Description  INTERVENTIONS:  - Monitor vital signs  - Monitor fetal heart rate  - Monitor uterine activity  - Monitor labor progression (vaginal delivery)  - DVT prophylaxis  - Antibiotic prophylaxis  Outcome: Progressing  Goal: Emotionally satisfying birthing experience for mother/fetus  Description  Interventions:  - Assess, plan, implement and evaluate the nursing care given to the patient in labor  - Advocate the philosophy that each childbirth experience is a unique experience and support the family's chosen level of involvement and control during the labor process   - Actively participate in both the patient's and family's teaching of the birth process  - Consider cultural, Faith and age-specific factors and plan care for the patient in labor  Outcome: Progressing     Problem: POSTPARTUM  Goal: Experiences normal postpartum course  Description  INTERVENTIONS:  - Monitor maternal vital signs  - Assess uterine involution and lochia  Outcome: Progressing  Goal: Appropriate maternal -  bonding  Description  INTERVENTIONS:  - Identify family support  - Assess for appropriate maternal/infant bonding   -Encourage maternal/infant bonding opportunities  - Referral to  or  as needed  Outcome: Progressing  Goal: Establishment of infant feeding pattern  Description  INTERVENTIONS:  - Assess breast/bottle feeding  - Refer to lactation as needed  Outcome: Progressing  Goal: Incision(s), wounds(s) or drain site(s) healing without S/S of infection  Description  INTERVENTIONS  - Assess and document risk factors for skin impairment   - Assess and document dressing, incision, wound bed, drain sites and surrounding tissue  - Consider nutrition services referral as needed  - Oral mucous membranes remain intact  - Provide patient/ family education  Outcome: Progressing

## 2020-07-19 NOTE — OB LABOR/OXYTOCIN SAFETY PROGRESS
Labor Progress Note - Edil Faulkner 26 y o  female MRN: [id number]    Unit/Bed#: L&D 326-01 Encounter: [id number]       Contraction Frequency (minutes): 2-4  Contraction Quality: Mild  Tachysystole: No   Dilation: 6        Effacement (%): 60  Station: -2  Baseline Rate: 145 bpm  Fetal Heart Rate: 150 BPM  FHR Category: Category I             Notes/comments:   Pt comfortable with epidural    FHT notable for spontaneous prolonged deceleration at 0106 last 3 min with tracy into the 110s  Resolution with maternal repositioning  Moderate variability noted throughout  SVE 6/60/-2  Continue expectant management, fetal monitoring      Rocío Miranda MD 7/19/2020 1:14 AM

## 2020-07-19 NOTE — OB LABOR/OXYTOCIN SAFETY PROGRESS
Oxytocin Safety Progress Check Note - Brayden Patrick 26 y o  female MRN: [id number]    Unit/Bed#: L&D 326-01 Encounter: [id number]    Dose (ranulfo-units/min) Oxytocin: 14 ranulfo-units/min  Contraction Frequency (minutes): 4-5  Contraction Quality: Mild  Tachysystole: No   Dilation: 6        Effacement (%): 70  Station: -2  Baseline Rate: 150 bpm  Fetal Heart Rate: 150 BPM  FHR Category: Category II             Notes/comments:   SVE unchanged; FHT with some variable decelerations, patient also noted to be hypotensive; will increase IV fluids and change maternal position  Continue pitocin titration      Kim Hill MD 7/19/2020 10:40 AM

## 2020-07-19 NOTE — PLAN OF CARE
Problem: PAIN - ADULT  Goal: Verbalizes/displays adequate comfort level or baseline comfort level  Description  Interventions:  - Encourage patient to monitor pain and request assistance  - Assess pain using appropriate pain scale  - Administer analgesics based on type and severity of pain and evaluate response  - Implement non-pharmacological measures as appropriate and evaluate response  - Consider cultural and social influences on pain and pain management  - Notify physician/advanced practitioner if interventions unsuccessful or patient reports new pain  Outcome: Progressing     Problem: INFECTION - ADULT  Goal: Absence or prevention of progression during hospitalization  Description  INTERVENTIONS:  - Assess and monitor for signs and symptoms of infection  - Monitor lab/diagnostic results  - Monitor all insertion sites, i e  indwelling lines, tubes, and drains  - Monitor endotracheal if appropriate and nasal secretions for changes in amount and color  - [place] appropriate cooling/warming therapies per order  - Administer medications as ordered  - Instruct and encourage patient and family to use good hand hygiene technique  - Identify and instruct in appropriate isolation precautions for identified infection/condition  Outcome: Progressing  Goal: Absence of fever/infection during neutropenic period  Description  INTERVENTIONS:  - Monitor WBC    Outcome: Progressing     Problem: SAFETY ADULT  Goal: Patient will remain free of falls  Description  INTERVENTIONS:  - Assess patient frequently for physical needs  -  Identify cognitive and physical deficits and behaviors that affect risk of falls    -  [place] fall precautions as indicated by assessment   - Educate patient/family on patient safety including physical limitations  - Instruct patient to call for assistance with activity based on assessment  - Modify environment to reduce risk of injury  - Consider OT/PT consult to assist with strengthening/mobility  Outcome: Progressing  Goal: Maintain or return to baseline ADL function  Description  INTERVENTIONS:  -  Assess patient's ability to carry out ADLs; assess patient's baseline for ADL function and identify physical deficits which impact ability to perform ADLs (bathing, care of mouth/teeth, toileting, grooming, dressing, etc )  - Assess/evaluate cause of self-care deficits   - Assess range of motion  - Assess patient's mobility; develop plan if impaired  - Assess patient's need for assistive devices and provide as appropriate  - Encourage maximum independence but intervene and supervise when necessary  - Involve family in performance of ADLs  - Assess for home care needs following discharge   - Consider OT consult to assist with ADL evaluation and planning for discharge  - Provide patient education as appropriate  Outcome: Progressing  Goal: Maintain or return mobility status to optimal level  Description  INTERVENTIONS:  - Assess patient's baseline mobility status (ambulation, transfers, stairs, etc )    - Identify cognitive and physical deficits and behaviors that affect mobility  - Identify mobility aids required to assist with transfers and/or ambulation (gait belt, sit-to-stand, lift, walker, cane, etc )  - [place] fall precautions as indicated by assessment  - Record patient progress and toleration of activity level on Mobility SBAR; progress patient to next Phase/Stage  - Instruct patient to call for assistance with activity based on assessment  - Consider rehabilitation consult to assist with strengthening/weightbearing, etc   Outcome: Progressing     Problem: Knowledge Deficit  Goal: Patient/family/caregiver demonstrates understanding of disease process, treatment plan, medications, and discharge instructions  Description  Complete learning assessment and assess knowledge base    Interventions:  - Provide teaching at level of understanding  - Provide teaching via preferred learning methods  Outcome: Progressing     Problem: DISCHARGE PLANNING  Goal: Discharge to home or other facility with appropriate resources  Description  INTERVENTIONS:  - Identify barriers to discharge w/patient and caregiver  - Arrange for needed discharge resources and transportation as appropriate  - Identify discharge learning needs (meds, wound care, etc )  - Arrange for interpretive services to assist at discharge as needed  - Refer to Case Management Department for coordinating discharge planning if the patient needs post-hospital services based on physician/advanced practitioner order or complex needs related to functional status, cognitive ability, or social support system  Outcome: Progressing     Problem: BIRTH - VAGINAL/ SECTION  Goal: Fetal and maternal status remain reassuring during the birth process  Description  INTERVENTIONS:  - Monitor vital signs  - Monitor fetal heart rate  - Monitor uterine activity  - Monitor labor progression (vaginal delivery)  - DVT prophylaxis  - Antibiotic prophylaxis  Outcome: Progressing  Goal: Emotionally satisfying birthing experience for mother/fetus  Description  Interventions:  - Assess, plan, implement and evaluate the nursing care given to the patient in labor  - Advocate the philosophy that each childbirth experience is a unique experience and support the family's chosen level of involvement and control during the labor process   - Actively participate in both the patient's and family's teaching of the birth process  - Consider cultural, [place] and age-specific factors and plan care for the patient in labor  Outcome: Progressing

## 2020-07-19 NOTE — L&D DELIVERY NOTE
Vaginal Delivery Summary - OB/GYN   Arlin Matthew 26 y o  female MRN: [id number]  Unit/Bed#: L&D 326-01 Encounter: [id number]          Predelivery Diagnosis:  1  Pregnancy at 38w0d  2  History of  section  3  Asthma     Postdelivery Diagnosis:  1  Same as above  2  Delivery of term     Procedure: Spontaneous Vaginal Delivery/, repair of first degree laceration    Attending: Dr Chris Molina    Assistant: Dr Satinder Hernandez     Anesthesia: Epidural    QBL: 127 cc  Admission Hg: 10 8  Admission platelets: 157    Complications: none apparent    Specimens: cord blood, arterial and venous cord blood gasses, placenta to storage    Findings:   1  Viable male at 1546, with APGARS of 8 and 9 at 1 and 5 minutes respectively,  2  Manual extraction of placenta at 1607  3  1 degree laceration repaired with 3-0 Vicryl  4  Blood gases:    Umbilical Cord Venous Blood Gas:  Results from last 7 days   Lab Units 20  1546   PH COV  7 423   PCO2 COV mm HG 30 5   HCO3 COV mmol/L 19 5   BASE EXC COV mmol/L -3 8*   O2 CT CD VB mL/dL 15 8   O2 HGB, VENOUS CORD % 39 0     Umbilical Cord Arterial Blood Gas:  Results from last 7 days   Lab Units 20  1546   PH COA  7 338   PCO2 COA  40 2   PO2 COA mm HG 21 4   HCO3 COA mmol/L 21 1   BASE EXC COA mmol/L -4 4*   O2 CONTENT CORD ART ml/dl 9 3   O2 HGB, ARTERIAL CORD % 47 9       Disposition:  Patient tolerated the procedure well and was recovering in labor and delivery room     Brief history and labor course:  Arlin Matthew is a 26 y o   at 38w0d who was initially admitted for labor with a desire to [facility]  Her labor was augmented with pitocin and she received an epidural for analgesia  She was artificially ruptured for clear fluid  An IUPC was placed and she received an amnioinfusion  Description of procedure    After pushing for 8 minutes, at 1546 patient delivered a viable male , wt pending, apgars of 8 (1 min) and 9 (5 min)   The fetal vertex delivered spontaneously  Baby was checked for nuchal  A tight nuchal cord was present around the neck which was clamped and cut  The anterior shoulder delivered atraumatically with maternal expulsive forces and the assistance of downward traction  The posterior shoulder delivered with maternal expulsive forces and the assistance of upward traction  The remainder of the fetus delivered spontaneously  Upon delivery, the infant was placed on the mothers abdomen and the cord was clamped and cut  Delayed cord clamping was not performed  The infant was noted to cry spontaneously and was moving all extremities appropriately  There was no evidence for injury  Awaiting nurse resuscitators evaluated the   Arterial and venous cord blood gases and cord blood was collected for analysis  These were promptly sent to the lab  In the immediate post-partum, 30 units of IV pitocin was administered, and the uterus was noted to contract down well with massage and pitocin  The placenta failed to deliver spontaneously, and was manually extracted  The patient received Ancef  The vagina, cervix, perineum, and rectum were inspected and there was noted to be a first degree laceration that was repaired in the usual fashion  At the conclusion of the procedure, all needle, sponge, and instrument counts were noted to be correct  Patient tolerated the procedure well and was allowed to recover in labor and delivery room with family and  before being transferred to the post-partum floor  Dr Felicity Casper was present and participated in all key portions of the case          Marisa Montoya MD  2020  5:49 PM

## 2020-07-19 NOTE — OB LABOR/OXYTOCIN SAFETY PROGRESS
Oxytocin Safety Progress Check Note - Bronwyn Garcia 26 y o  female MRN: [id number]    Unit/Bed#: L&D 326-01 Encounter: [id number]    Dose (ranulfo-units/min) Oxytocin: 16 ranulfo-units/min  Contraction Frequency (minutes): 4-5  Contraction Quality: Mild  Tachysystole: No   Dilation: 6        Effacement (%): 70  Station: -2  Baseline Rate: 150 bpm  Fetal Heart Rate: 150 BPM  FHR Category: Category II             Notes/comments:   SVE unchanged, variable decelerations now recurrent and not responsive to position change and IV fluid bolus  Will start amnioinfusion      Courtland Cowden, MD 7/19/2020 11:30 AM

## 2020-07-19 NOTE — OB LABOR/OXYTOCIN SAFETY PROGRESS
Labor Progress Note - Threasa Days 26 y o  female MRN: [id number]    Unit/Bed#: L&D 326-01 Encounter: [id number]       Contraction Frequency (minutes): 4-5  Contraction Quality: Moderate  Tachysystole: No   Dilation: 6        Effacement (%): 70  Station: -2  Baseline Rate: 140 bpm  Fetal Heart Rate: 150 BPM  FHR Category: Category I             Notes/comments:   Pt comfortable with epidural but does feel cramping with contractions  SVE 6/70/-2  Per nursing ctx q3-4min on palpation, not tracking well on toco    Patient has been 6 cm dilated for last several exams  Will provide labor augmentation now that she is in the active phase of labor  Plan for pitocin titration to start, then AROM later this morning       Nina Hammond MD 7/19/2020 5:40 AM

## 2020-07-19 NOTE — OB LABOR/OXYTOCIN SAFETY PROGRESS
Oxytocin Safety Progress Check Note - Anu Gauthier 26 y o  female MRN: [id number]    Unit/Bed#: L&D 326-01 Encounter: [id number]    Dose (ranulfo-units/min) Oxytocin: 18 ranulfo-units/min  Contraction Frequency (minutes): 3  Contraction Quality: Mild  Tachysystole: No   Dilation: 8        Effacement (%): 90  Station: -1  Baseline Rate: 130 bpm  Fetal Heart Rate: 150 BPM  FHR Category: Category II             Notes/comments:     Patient reporting increased vaginal pressure, especially with contractions   Will consult anesthesia     Renu Dietz MD 7/19/2020 1:05 PM

## 2020-07-19 NOTE — OB LABOR/OXYTOCIN SAFETY PROGRESS
Oxytocin Safety Progress Check Note - Orpha Heads 26 y o  female MRN: [id number]    Unit/Bed#: L&D 326-01 Encounter: [id number]    Dose (ranulfo-units/min) Oxytocin: 8 ranulfo-units/min  Contraction Frequency (minutes): 7  Contraction Quality: Mild  Tachysystole: No   Dilation: 6        Effacement (%): 70  Station: -2  Baseline Rate: 150 bpm  Fetal Heart Rate: 150 BPM  FHR Category: Category I             Notes/comments:   SVE unchanged; artificially ruptured for clear fluid, IUPC placed to better monitor contractions  Category I FHT  Continue pitocin titration      Nilsa Rodarte MD 7/19/2020 7:52 AM

## 2020-07-20 VITALS
HEART RATE: 80 BPM | DIASTOLIC BLOOD PRESSURE: 70 MMHG | OXYGEN SATURATION: 98 % | RESPIRATION RATE: 18 BRPM | SYSTOLIC BLOOD PRESSURE: 107 MMHG | TEMPERATURE: 98.3 F | BODY MASS INDEX: 49.09 KG/M2 | WEIGHT: 260 LBS | HEIGHT: 61 IN

## 2020-07-20 DIAGNOSIS — O99.519 ASTHMA AFFECTING PREGNANCY, ANTEPARTUM: ICD-10-CM

## 2020-07-20 DIAGNOSIS — J45.909 ASTHMA AFFECTING PREGNANCY, ANTEPARTUM: ICD-10-CM

## 2020-07-20 LAB — RPR SER QL: NORMAL

## 2020-07-20 PROCEDURE — 99024 POSTOP FOLLOW-UP VISIT: CPT | Performed by: OBSTETRICS & GYNECOLOGY

## 2020-07-20 RX ORDER — ACETAMINOPHEN 325 MG/1
650 TABLET ORAL EVERY 6 HOURS PRN
Qty: 30 TABLET | Refills: 0 | Status: SHIPPED | OUTPATIENT
Start: 2020-07-20 | End: 2021-09-08 | Stop reason: HOSPADM

## 2020-07-20 RX ORDER — DIAPER,BRIEF,INFANT-TODD,DISP
1 EACH MISCELLANEOUS 4 TIMES DAILY PRN
Qty: 30 G | Refills: 0 | Status: SHIPPED | OUTPATIENT
Start: 2020-07-20 | End: 2020-07-23

## 2020-07-20 RX ORDER — CALCIUM CARBONATE 200(500)MG
500 TABLET,CHEWABLE ORAL DAILY PRN
Qty: 30 TABLET | Refills: 0 | Status: SHIPPED | OUTPATIENT
Start: 2020-07-20 | End: 2020-11-24 | Stop reason: ALTCHOICE

## 2020-07-20 RX ORDER — DOCUSATE SODIUM 100 MG/1
100 CAPSULE, LIQUID FILLED ORAL 2 TIMES DAILY
Qty: 10 CAPSULE | Refills: 0 | Status: SHIPPED | OUTPATIENT
Start: 2020-07-20 | End: 2020-11-24 | Stop reason: ALTCHOICE

## 2020-07-20 RX ORDER — IBUPROFEN 600 MG/1
600 TABLET ORAL EVERY 6 HOURS PRN
Qty: 30 TABLET | Refills: 0 | Status: SHIPPED | OUTPATIENT
Start: 2020-07-20 | End: 2020-07-23

## 2020-07-20 RX ORDER — SIMETHICONE 80 MG
80 TABLET,CHEWABLE ORAL 4 TIMES DAILY PRN
Qty: 30 TABLET | Refills: 0 | Status: SHIPPED | OUTPATIENT
Start: 2020-07-20 | End: 2020-11-24 | Stop reason: ALTCHOICE

## 2020-07-20 RX ADMIN — IBUPROFEN 600 MG: 600 TABLET ORAL at 15:44

## 2020-07-20 RX ADMIN — IBUPROFEN 600 MG: 600 TABLET ORAL at 04:07

## 2020-07-20 RX ADMIN — FLUTICASONE FUROATE AND VILANTEROL TRIFENATATE 1 PUFF: 100; 25 POWDER RESPIRATORY (INHALATION) at 08:16

## 2020-07-20 RX ADMIN — DOCUSATE SODIUM 100 MG: 100 CAPSULE, LIQUID FILLED ORAL at 08:15

## 2020-07-20 RX ADMIN — DOCUSATE SODIUM 100 MG: 100 CAPSULE, LIQUID FILLED ORAL at 17:52

## 2020-07-20 NOTE — DISCHARGE INSTRUCTIONS
Vaginal Delivery   WHAT YOU SHOULD KNOW:   A vaginal delivery is the birth of your baby through your vagina (birth canal)  AFTER YOU LEAVE:   Medicines:  · NSAIDs  help decrease swelling and pain or fever  This medicine is available with or without a doctor's order  NSAIDs can cause stomach bleeding or kidney problems in certain people  If you take blood thinner medicine, always ask your healthcare provider if NSAIDs are safe for you  Always read the medicine label and follow directions  · Take your medicine as directed  Call your healthcare provider if you think your medicine is not helping or if you have side effects  Tell him if you are allergic to any medicine  Keep a list of the medicines, vitamins, and herbs you take  Include the amounts, and when and why you take them  Bring the list or the pill bottles to follow-up visits  Carry your medicine list with you in case of an emergency  Follow up with your primary healthcare provider:  Most women need to return 6 weeks after a vaginal delivery  Ask about how to care for your wounds or stitches  Write down your questions so you remember to ask them during your visits  Activity:  Rest as much as possible  Try to keep all activities short  You may be able to do some exercise soon after you have your baby  Talk with your primary healthcare provider before you start exercising  If you work outside the home, ask when you can return to your job  Kegel exercises:  Kegel exercises may help your vaginal and rectal muscles heal faster  You can do Kegel exercises by tightening and relaxing the muscles around your vagina  Kegel exercises help make the muscles stronger  Breast care:  When your milk comes in, your breasts may feel full and hard  Ask how to care for your breasts, even if you are not breastfeeding  Constipation:  Do not try to push the bowel movement out if it is too hard   High-fiber foods, extra liquids, and regular exercise can help you prevent constipation  Examples of high-fiber foods are fruit and bran  Prune juice and water are good liquids to drink  Regular exercise helps your digestive system work  You may also be told to take over-the-counter fiber and stool softener medicines  Take these items as directed  Hemorrhoids:  Pregnancy can cause severe hemorrhoids  You may have rectal pain because of the hemorrhoids  Ask how to prevent or treat hemorrhoids  Perineum care: Your perineum is the area between your vagina and anus  Keep the area clean and dry to help it heal and to prevent infection  Wash the area gently with soap and water when you bathe or shower  Rinse your perineum with warm water when you use the toilet  Your primary healthcare provider may suggest you use a warm sitz bath to help decrease pain  A sitz bath is a bathtub or basin filled to hip level  Stay in the sitz bath for 20 to 30 minutes, or as directed  Vaginal discharge: You will have vaginal discharge, called lochia, after your delivery  The lochia is bright red the first day or two after the birth  By the fourth day, the amount decreases, and it turns red-brown  Use a sanitary pad rather than a tampon to prevent a vaginal infection  It is normal to have lochia up to 8 weeks after your baby is born  Monthly periods: Your period may start again within 7 to 12 weeks after your baby is born  If you are breastfeeding, it may take longer for your period to start again  You can still get pregnant again even though you do not have your monthly period  Talk with your primary healthcare provider about a birth control method that will be good for you if you do not want to get pregnant  Mood changes: Many new mothers have some kind of mood changes after delivery  Some of these changes occur because of lack of sleep, hormone changes, and caring for a new baby  Some mood changes can be more serious, such as postpartum depression   Talk with your primary healthcare provider if you feel unable to care for yourself or your baby  Sexual activity:  You may need to avoid sex for 6 to 7 weeks after you have your baby  You may notice you have a decreased desire for sex, or sex may be painful  You may need to use a vaginal lubricant (gel) to help make sex more comfortable  Contact your primary healthcare provider if:   · You have heavy vaginal bleeding that fills 1 or more sanitary pads in 1 hour  · You have a fever  · Your pain does not go away, or gets worse  · The skin between your vagina and rectum is swollen, warm, or red  · You have swollen, hard, or painful breasts  · You feel very sad or depressed  · You feel more tired than usual      · You have questions or concerns about your condition or care  Seek care immediately or call 911 if:   · You have pus or yellow drainage coming from your vagina or wound  · You are urinating very little, or not at all  · Your arm or leg feels warm, tender, and painful  It may look swollen and red  · You feel lightheaded, have sudden and worsening chest pain, or trouble breathing  You may have more pain when you take deep breaths or cough, or you may cough up blood  © 2014 [address] is for End User's use only and may not be sold, redistributed or otherwise used for commercial purposes  All illustrations and images included in CareNotes® are the copyrighted property of [employer] M [employer]  or Phil Hdez  The above information is an  only  It is not intended as medical advice for individual conditions or treatments  Talk to your doctor, nurse or pharmacist before following any medical regimen to see if it is safe and effective for you  Postpartum Perineal Care   WHAT YOU NEED TO KNOW:   Postpartum perineal care is care for your perineum after you have a baby  The perineum is your vagina and anus     DISCHARGE INSTRUCTIONS:   Care for your perineum:  Healthcare providers will give you a small squirt bottle and show you how to use it  Do the following after you use the toilet and before you put on a new pad:  · Remove the soiled pad    · Use the squirt bottle to rinse your perineum from front to back while you sit on the toilet     · Pat the area dry from front to back with toilet paper or a cotton cloth     · Put on a fresh pad    · Wash your hands  Decrease pain:  Ask your healthcare provider about these and other ways to decrease perineal pain:  · Sitz baths:  Healthcare providers may give you a portable sitz bath  This is a small tub that fits in the toilet  Fill the sitz bath or bathtub with 4 to 6 inches of warm water  Sit in the warm water for 20 minutes 2 to 3 times a day  · Ice:  Ice helps decrease swelling and pain  Ice may also help prevent tissue damage  Use an ice pack, or put crushed ice in a plastic bag  Cover it with a towel and place it on your perineum for 15 to 20 minutes every hour, or as directed  · Medicine spray, wipes, or pads:  Healthcare providers may give you a medicine spray or wipes soaked with numbing medicine to decrease the pain  Pads that contain an herb called witch hazel may also help reduce pain  Use these after perineal care or a sitz bath  Follow up with your healthcare provider as directed:  Write down your questions so you remember to ask them during your visits  Contact your healthcare provider if:   · You have heavy vaginal bleeding that fills 1 or more sanitary pads in 1 hour  · You have foul-smelling vaginal discharge  · You feel weak or lightheaded  · You have questions or concerns about your condition or care  Seek care immediately or call 911 if:   · You have large blood clots or bright red blood coming from your vagina  · You have abdominal pain, vomiting, and a fever    © 2017 [address] Information is for End User's use only and may not be sold, redistributed or otherwise used for commercial purposes  All illustrations and images included in CareNotes® are the copyrighted property of [employer]  or Phil Hdez  The above information is an  only  It is not intended as medical advice for individual conditions or treatments  Talk to your doctor, nurse or pharmacist before following any medical regimen to see if it is safe and effective for you  Postpartum Depression   WHAT YOU NEED TO KNOW:   What is postpartum depression? Postpartum depression is a mood disorder that occurs after giving birth  A mood is an emotion or a feeling  Moods affect your behavior and how you feel about yourself and life in general  Depression is a sad mood that you cannot control  Women often feel sad, afraid, or nervous after their baby is born  These feelings are called postpartum blues or baby blues, and they usually go away in 1 to 2 weeks  With postpartum depression, these symptoms get worse and continue for more than 2 weeks  Postpartum depression is a serious condition that affects your daily activities and relationships  What causes postpartum depression? Healthcare providers do not know exactly what causes postpartum depression  It may be caused by a sudden drop in hormone levels after childbirth  A previous episode of postpartum depression or a family history of depression may increase your risk  Several things may trigger postpartum depression:  · Lack of support from the baby's father or other family members    · Feeling more tired than usual    · Stress, a poor diet, or lack of sleep    · Pain after childbirth or pain during breastfeeding    · Sudden change in lifestyle  How is postpartum depression diagnosed? Postpartum depression affects your daily activities and your relationships with other people  Healthcare providers will ask you questions about your signs and symptoms and how they are affecting your life   The symptoms of postpartum depression usually begin within 1 month after childbirth  You feel depressed or lose interest in activities you enjoy nearly every day for at least 2 weeks  You also have 4 or more of the following symptoms:  · You feel tired or have less energy than usual      · You feel unimportant or guilty most of the time  · You think about hurting or killing yourself  · Your appetite changes  You may lose your appetite and lose weight without trying  Your appetite may also increase and you may gain weight  · You are restless, irritable, or withdrawn  · You have trouble concentrating and remembering things  You have trouble doing daily tasks or making decisions  · You have trouble sleeping, even after the baby is asleep  How is postpartum depression treated? · Psychotherapy:  During therapy, you will talk with healthcare providers about how to cope with your feelings and moods  This can be done alone or in a group  It may also be done with family members or your partner  · Antidepressants: This medicine is given to decrease or stop the symptoms of depression  You usually need to take antidepressants for several weeks before you begin to feel better  Do not stop taking antidepressants unless your healthcare provider tells you to  Healthcare providers may try a different antidepressant if one type does not work  What can I do to feel better? · Rest:  Do not try to do everything all at the same time  Do only what is needed and let other things wait until later  Ask your family or friends for help, especially if you have other children  Ask your partner to help with night feedings or other baby care  Try to sleep when the baby naps  · Get emotional support:  Share your feelings with your partner, a friend, or another mother  · Take care of yourself:  Shower and dress each day  Do not skip meals  Try to get out of the house a little each day  Get regular exercise  Eat a healthy diet   Avoid alcohol because it can make your depression worse  Do not isolate yourself  Go for a walk or meet with a friend  It is also important that you have some time by yourself each day  How do I find support and more information? · [address] [facility], Public Information & Communication Branch  [address], [address], [address]  [place] [address]   Phone: [phone]  Phone: [phone]  Web Address: [facility]  When should I contact my healthcare provider? · You cannot make it to your next visit  · Your depression does not get better with treatment or it gets worse  · You have questions or concerns about your condition or care  When should I seek immediate care or call 911? · You think about hurting or killing yourself, your baby, or someone else  · You feel like other people want to hurt you  · You hear voices telling you to hurt yourself or your baby  CARE AGREEMENT:   You have the right to help plan your care  Learn about your health condition and how it may be treated  Discuss treatment options with your caregivers to decide what care you want to receive  You always have the right to refuse treatment  The above information is an  only  It is not intended as medical advice for individual conditions or treatments  Talk to your doctor, nurse or pharmacist before following any medical regimen to see if it is safe and effective for you  © [address] [facility] Information is for End User's use only and may not be sold, redistributed or otherwise used for commercial purposes  All illustrations and images included in CareNotes® are the copyrighted property of A D A M , Inc  or Phil Hdez  Postpartum Bleeding   WHAT YOU NEED TO KNOW:   Postpartum bleeding is vaginal bleeding after childbirth  This bleeding is normal, whether your baby was born vaginally or by    It contains blood and the tissue that lined the inside of your uterus when you were pregnant  DISCHARGE INSTRUCTIONS:   What to expect with postpartum bleeding:  Postpartum bleeding usually lasts at least 10 days, and may last longer than 6 weeks  Your bleeding may range from light (barely staining a pad) to heavy (soaking a pad in 1 hour)  Usually, you have heavier bleeding right after childbirth, which slows over the next few weeks until it stops  The bleeding is red or dark brown with clots for the first 1 to 3 days  It then turns pink for several days, and then becomes a white or yellow discharge until it ends  Follow up with your obstetrician as directed:  Do not have sex until your obstetrician says it is okay  Write down your questions so you remember to ask them during your visits  Contact your healthcare provider or obstetrician if:   · Your bleeding increases, or you have heavy bleeding that soaks a pad in 1 hour for 2 hours in a row  · You pass large blood clots  · You are breathing faster than normal, or your heart is beating faster than normal     · You are urinating less than usual, or not at all  · You feel dizzy  · You have questions or concerns about your condition or care  Seek immediate care or call 911 if:   · You are suddenly short of breath and feel lightheaded  · You have sudden chest pain  © 2017 [address] Information is for End User's use only and may not be sold, redistributed or otherwise used for commercial purposes  All illustrations and images included in CareNotes® are the copyrighted property of [employer] M [employer] Inc  or Phil Hdez  The above information is an  only  It is not intended as medical advice for individual conditions or treatments  Talk to your doctor, nurse or pharmacist before following any medical regimen to see if it is safe and effective for you        Breast Care for the Breast Feeding Mother   WHAT YOU SHOULD KNOW:   Your breasts will go through normal changes while you are breastfeeding  Sometimes breast and nipple problems can develop while you are breastfeeding  Learn about changes that are normal and those that may be a problem  Breast care can help you prevent and manage problems so you and your baby can enjoy the benefits of breastfeeding  AFTER YOU LEAVE:   Breast changes while you are breastfeeding:   · For the first few days after your baby is born, your body makes a small amount of breast milk (colostrum)  Within about 2 to 5 days, your body will begin making mature milk  It may take up to 10 days or longer for mature milk to come in  When your mature milk comes in, your breasts will become full and firm  They may feel tender  · Breastfeeding your baby will decrease the full feeling in your breasts  You may feel a tingly sensation during feedings as milk is released from your breasts  This is called the milk let-down reflex  After 7 or more days, the fullness may feel like it has decreased  Your nipples should look the same as they did before you started breastfeeding  Breasts that feel full before and empty after breastfeeding are signs that breastfeeding is going well  Breast problems that can occur while you are breastfeeding:   · Nipple soreness  may occur when you begin to breastfeed your baby  You may also have nipple soreness if your baby is not latched on to your breast correctly  Correct positioning and latch-on may decrease or stop the pain in your nipples  Work with your caregivers to help your baby latch on correctly  It may also be helpful to place warm, wet compresses on your nipples to help decrease pain  · Plugged milk ducts  may cause painful breast lumps  Plugged ducts may be caused by not emptying your breasts completely during feedings  When your baby pauses during breastfeeding, massage and gently squeeze your breast  Gentle massage may unplug a blocked milk duct  Pump out any milk left in your breasts after your baby is done breastfeeding  Avoid wearing tight tops, tight bras, or under-wire bras, because they may put pressure on your breasts  · Engorgement  may occur as your milk comes in soon after you begin breastfeeding  Engorgement may cause your breasts to become swollen and painful  Your breasts may also become engorged if you miss a feeding or you do not breastfeed on demand  The best way to decrease engorgement symptoms is to empty your breasts by feeding your baby often  Engorgement can make it hard for your baby to latch on to your breast  If this happens, express a small amount of milk and then have your baby latch on  Cold compresses, gel packs, or ice packs on your breasts can help decrease pain and swelling  Ask your caregiver how often and how long you should use cold, or ice packs  · A breast infection called mastitis  can develop if you have plugged milk ducts or engorgement  Mastitis causes your breasts to become red, swollen, and painful  You may also have flu-like symptoms, such as chills and a fever  Place heat on your breasts to help decrease the pain  You may want to place a moist, warm cloth on the painful breast or both of your breasts  Ask how often to do this  Your primary healthcare provider [facility]) may suggest that you take an NSAID, such as ibuprofen, to decrease pain and swelling  He may also order antibiotics to treat mastitis  Ask about feeding your baby when you have a breast infection  How to help prevent or manage breast problems while you are breastfeeding:   · Learn how to position your baby and latch him on correctly  To latch your baby correctly to your breast, make sure that his mouth covers most of your areola (dark area around your nipple)  He should not be attached only to the nipple  Your baby is latched on well if you feel comfortable and do not feel pain  A correct latch helps him get enough milk and can help to prevent sore nipples and other breast problems   There are several breastfeeding positions that you can try  Find the position that works best for you and your baby  Ask your caregiver for more information about how to hold and breastfeed your baby  · Prevent biting  Your baby may get teeth at about 1to 3months of age  To help prevent biting, break his suction once he is finished or if he has fallen asleep  To break his suction, slip a finger into the side of his mouth  If your baby bites you, respond with surprise or unhappiness  Offer praise when he does not bite you  · Breastfeed your baby regularly  Feed your baby 8 to 12 times a day  You may need to wake up your baby at night to feed him  It is okay to feed from 1 or both breasts at each feeding  Your baby should breastfeed from both breasts equally over the course of a day  If your baby only feeds from 1 side during a feeding, offer your other breast to him first for the next feeding  · Schedule and keep follow-up visits  Talk to your baby's pediatrician or your PHP during follow-up visits if you have breast problems  Caregivers may suggest that you, or you and your partner, attend classes on breastfeeding  You also may want to join a breastfeeding support group  Caregivers may suggest that you see a lactation consultant  This is a caregiver who can help you with breastfeeding  Contact your PHP if:   · You have a fever and chills  · You have body aches and you feel like you do not have any energy  · One or both of your breasts is red, swollen or hard, painful, and feels warm or hot  · You have breast engorgement that does not get better within 24 hours  · You see or feel a lump in your breast that hurts when you touch it  · You have nipple pain during breastfeeding or between feedings  · Your nipples are red, dry, cracked, or bleeding, or they have scabs on them  · You have questions or concerns about your condition or care    © 2014 [address] is for End User's use only and may not be sold, redistributed or otherwise used for commercial purposes  All illustrations and images included in CareNotes® are the copyrighted property of [employer] M , Inc  or Phil Hdez  The above information is an  only  It is not intended as medical advice for individual conditions or treatments  Talk to your doctor, nurse or pharmacist before following any medical regimen to see if it is safe and effective for you  Novel Coronavirus   WHAT YOU NEED TO KNOW:   What is the novel coronavirus (nCoV)? The nCoV is a new strain (type) of coronavirus  Coronaviruses often cause mild respiratory (lung) infections, such as the common cold  They can also cause more severe infections  Examples include acute respiratory syndrome (SARS), [place] respiratory syndrome (MERS), pneumonia, and bronchitis  The nCoV can cause more severe symptoms than earlier coronaviruses  The nCoV was first found in individuals in part [facility] at the end of 2019  The virus is spreading as people who are infected travel to other parts of the world  What are the signs and symptoms of nCoV? Signs and symptoms can take 2 to 14 days to develop and range from mild to severe:  · Fever    · Cough    · Shortness of breath or trouble breathing    · Pneumonia (in severe cases)    How does nCoV spread? It is not known for sure how the virus spreads  The virus may spread in droplets when an infected person coughs or sneezes  Others become infected by breathing in the virus or getting the virus in their eyes  The virus can also possibly spread if a person touches certain animals, such as in a live market  How is nCoV diagnosed? If you develop symptoms of nCoV, you may need to be checked  Call your doctor if you traveled within the past 14 days to an area where nCoV is active  Call your doctor if you have close contact with someone else who did  Your doctor will tell you what to do   He or she will need to take precautions in the office to protect staff members and other patients  Your doctor will take samples from your airway  The samples have to be sent to the Centers for Disease Control and Prevention (CDC) to be tested  What should I do if I have nCoV? No treatment is available for nCoV  Medicine may be given to help relieve your cough or fever, or to help you breathe more easily  Severe nCoV may need to be treated in the hospital  In the hospital, you may need breathing treatment or support, such as extra oxygen  The following can help you prevent spreading nCoV to others  Have anyone you are caring for who has nCoV also use the guidelines  · Limit close contact with others  Stay in a different room, or sleep in a separate bed  Remind others to stay at least 6 feet (2 meters) away from you while you are sick  Only go out of your house if you are going to a medical appointment  While you are recovering, always call the office of any healthcare provider you seeing  The provider will need to prepare for your arrival to keep others safe  · Wear a medical mask when you are around others  A medical mask will help prevent you from spreading the virus  You can ask others around you to wear a medical mask if you are not able to wear one  · Cover your mouth and nose to sneeze or cough  Sneeze and cough into a tissue that covers your mouth and nose  Use the bend of our arm if you do not have a tissue  Throw the tissue away in a trash can right away  Then wash your hands well with soap and water  Use hand  that contains alcohol if you cannot wash your hands  · Wash your hands often  You and everyone in your home must wash your hands throughout the day  Use soap and water  Use germ-killing gel if soap and water are not available  Do not touch your eyes or mouth if you have not washed your hands  · Do not share items with other people  Do not share dishes, towels, or other items with anyone   Always wash items after you use them  · Clean frequently touched surfaces often  Use household  or bleach diluted with water to clean counters, doorknobs, toilet seats, and other surfaces  · Do not handle live animals  You may be able to spread nCoV to animals, including pets  Until more is known, it is best not to touch, play with, or handle live animals while you are sick  What can I do to relieve my symptoms? The following may help if you have mild symptoms:  · Drink more liquids as directed  Liquids will help thin and loosen mucus so you can cough it up  Liquids will also help prevent dehydration  Liquids that help prevent dehydration include water, fruit juice, and broth  Do not drink liquids that contain caffeine  Caffeine can increase your risk for dehydration  Ask your healthcare provider how much liquid to drink each day  · Soothe a sore throat  Gargle with warm salt water  This helps your sore throat feel better  Make salt water by dissolving ¼ teaspoon salt in 1 cup warm water  You may also suck on hard candy or throat lozenges  You may use a sore throat spray  · Use a humidifier or vaporizer  Use a cool mist humidifier or a vaporizer to increase air moisture in your home  This may make it easier for you to breathe and help decrease your cough  · Use saline nasal drops as directed  These help relieve congestion  · Apply petroleum-based jelly around the outside of your nostrils  This can decrease irritation from blowing your nose  · Do not smoke  Nicotine and other chemicals in cigarettes and cigars can make your symptoms worse  They can also cause infections such as bronchitis or pneumonia  Ask your healthcare provider for information if you currently smoke and need help to quit  E-cigarettes or smokeless tobacco still contain nicotine  Talk to your healthcare provider before you use these products      Call your local emergency number (911 in the [address]) for any of the following:  Tell the  you may have nCoV  This will help anyone in the ambulance stay safe, and to call ahead to the hospital   · You have sudden shortness of breath  · You have a fast heartbeat or chest pain  When should I seek immediate care? · You feel so dizzy that you have trouble standing up  · Your lips and fingernails turn blue  When should I call my doctor? · You have a fever over 102ºF (39ºC)  · Your sore throat gets worse or you see white or yellow spots in your throat  · Your symptoms get worse after 3 to 5 days or your cold is not better in 14 days  · You have a rash anywhere on your skin  · You have large, tender lumps in your neck  · You have thick, green, or yellow drainage from your nose  · You cough up thick yellow, green, or bloody mucus  · You are vomiting for more than 24 hours and cannot keep fluids down  · You have a bad earache  · You have questions or concerns about your condition or care  CARE AGREEMENT:   You have the right to help plan your care  Learn about your health condition and how it may be treated  Discuss treatment options with your healthcare providers to decide what care you want to receive  You always have the right to refuse treatment  The above information is an  only  It is not intended as medical advice for individual conditions or treatments  Talk to your doctor, nurse or pharmacist before following any medical regimen to see if it is safe and effective for you  © Copyright [address] Information is for End User's use only and may not be sold, redistributed or otherwise used for commercial purposes   All illustrations and images included in CareNotes® are the copyrighted property of [employer]  or [address] [place] [address] [facility]

## 2020-07-20 NOTE — PROGRESS NOTES
Progress Note - OB/GYN   Concepción Dan 26 y o  female MRN: [id number]  Unit/Bed#: L&D 306-01 Encounter: [id number]    Assessment:  26 y o  [id number] s/p Spontaneous Vaginal Delivery () Postpartum day  1  Patient recovering well, Stable  Pregnancy c/b GBS positivity, asthma, recent yeast infection, currently denies vaginal itchiness and discomfort    Plan:  Continue routine post partum care  Pain management PRN  Encourage ambulation  Encourage breastfeeding  Anticipate d/c today, pending peds    Subjective/Objective   Chief Complaint:    Postpartum state    Subjective:   Pt reports feeling very well today, desires discharge  Pain: yes, cramping, improved with meds  Tolerating PO: yes  Voiding: yes  Flatus: yes  BM: yes  Ambulating: yes  Breastfeeding:  yes  Chest pain: no  Shortness of breath: no  Leg pain: no  Lochia: minimal    Objective:     Vitals: Temp:  [97 6 °F (36 4 °C)-98 8 °F (37 1 °C)] 97 6 °F (36 4 °C)  HR:  [] 87  Resp:  [16-20] 20  BP: ()/(49-86) 122/72       Physical Exam:   General: NAD, alert, oriented  Cardio: Regular rate and rhythm, no murmur  Resp: nonlabored breathing, clear to auscultation bilaterally  Abdomen: Soft, no distension/rebound/guarding/tenderness    3cm x 3cm area of bruising on lower abdomen, c/w area of fundal massage- TTP  Fundus: Firm, non-tender, fundus: 1 cm above umbilicus  G/U: moderate lochia noted on pad  Lower Extremities: Non-tender, no palpable cords    Medications:  Current Facility-Administered Medications   Medication Dose Route Frequency    acetaminophen (TYLENOL) tablet 650 mg  650 mg Oral Q6H PRN    albuterol (PROVENTIL HFA,VENTOLIN HFA) inhaler 2 puff  2 puff Inhalation Q6H PRN    benzocaine-menthol-lanolin-aloe (DERMOPLAST) 20-0 5 % topical spray 1 application  1 application Topical 4x Daily PRN    calcium carbonate (TUMS) chewable tablet 500 mg  500 mg Oral Daily PRN    diphenhydrAMINE (BENADRYL) tablet 25 mg  25 mg Oral Q6H PRN  docusate sodium (COLACE) capsule 100 mg  100 mg Oral BID    fluticasone-vilanterol (BREO ELLIPTA) 100-25 mcg/inh inhaler 1 puff  1 puff Inhalation Daily    hydrocortisone 1 % cream 1 application  1 application Topical 4x Daily PRN    ibuprofen (MOTRIN) tablet 600 mg  600 mg Oral Q6H PRN    metoclopramide (REGLAN) injection 10 mg  10 mg Intravenous Q6H PRN    simethicone (MYLICON) chewable tablet 80 mg  80 mg Oral 4x Daily PRN    witch hazel-glycerin (TUCKS) topical pad 1 pad  1 pad Topical Q4H PRN       Labs:   Recent Results (from the past 24 hour(s))   Blood gas, arterial, cord    Collection Time: 07/19/20  3:46 PM   Result Value Ref Range    pH, Cord Art 7 338 7 230 - 7 430    pCO2, Cord Art 40 2 30 0 - 60 0    pO2, Cord Art 21 4 5 0 - 25 0 mm HG    HCO3, Cord Art 21 1 17 3 - 27 3 mmol/L    Base Exc, Cord Art -4 4 (L) 3 0 - 11 0 mmol/L    O2 Content, Cord Art 9 3 ml/dl    O2 Hgb, Arterial Cord 47 9 %   Blood gas, venous, cord    Collection Time: 07/19/20  3:46 PM   Result Value Ref Range    pH, Cord Layo 7 423 7 190 - 7 490    pCO2, Cord Layo 30 5 27 0 - 43 0 mm HG    pO2, Cord Layo 34 1 15 0 - 45 0 mm HG    HCO3, Cord Layo 19 5 12 2 - 28 6 mmol/L    Base Exc, Cord Layo -3 8 (L) 1 0 - 9 0 mmol/L    O2 Cont, Cord Layo 15 8 mL/dL    O2 HGB,VENOUS CORD 79 4 %         Humphrey Chaudhari  Ob/Gyn PGY-1  7/20/2020  5:49 AM

## 2020-07-21 NOTE — PROGRESS NOTES
Contacted [facility] via [employer] regarding case management for breast pump  Breast pump will be shipped to patients home address  Patient was instructed to contact [facility] if she does not receive the pump within a week

## 2020-07-21 NOTE — TELEPHONE ENCOUNTER
Advair was added by me during pregnancy due to uncontrolled asthma   Pt is delivered now, may follow up with PCP

## 2020-07-22 DIAGNOSIS — O34.219 VBAC (VAGINAL BIRTH AFTER CESAREAN): ICD-10-CM

## 2020-07-23 ENCOUNTER — PATIENT MESSAGE (OUTPATIENT)
Dept: OBGYN CLINIC | Facility: CLINIC | Age: 30
End: 2020-07-23

## 2020-07-23 DIAGNOSIS — J45.909 ASTHMA AFFECTING PREGNANCY, ANTEPARTUM: ICD-10-CM

## 2020-07-23 DIAGNOSIS — O99.519 ASTHMA AFFECTING PREGNANCY, ANTEPARTUM: ICD-10-CM

## 2020-07-23 RX ORDER — DIAPER,BRIEF,INFANT-TODD,DISP
1 EACH MISCELLANEOUS 4 TIMES DAILY PRN
Qty: 28.35 G | Refills: 5 | Status: SHIPPED | OUTPATIENT
Start: 2020-07-23

## 2020-07-23 RX ORDER — IBUPROFEN 600 MG/1
600 TABLET ORAL EVERY 6 HOURS PRN
Qty: 30 TABLET | Refills: 0 | Status: SHIPPED | OUTPATIENT
Start: 2020-07-23 | End: 2020-11-24 | Stop reason: ALTCHOICE

## 2020-07-23 NOTE — TELEPHONE ENCOUNTER
Pt last seen in office on 7/8/19 and had a covid virtual visit on 4/3/20  She just had a baby and she will schedule an office visit   Sent to provider for approval

## 2020-09-16 DIAGNOSIS — J45.909 ASTHMA AFFECTING PREGNANCY, ANTEPARTUM: ICD-10-CM

## 2020-09-16 DIAGNOSIS — O99.519 ASTHMA AFFECTING PREGNANCY, ANTEPARTUM: ICD-10-CM

## 2020-11-06 DIAGNOSIS — J45.909 ASTHMA AFFECTING PREGNANCY, ANTEPARTUM: ICD-10-CM

## 2020-11-06 DIAGNOSIS — O99.519 ASTHMA AFFECTING PREGNANCY, ANTEPARTUM: ICD-10-CM

## 2020-11-24 ENCOUNTER — TELEMEDICINE (OUTPATIENT)
Dept: FAMILY MEDICINE CLINIC | Facility: CLINIC | Age: 30
End: 2020-11-24
Payer: COMMERCIAL

## 2020-11-24 VITALS — BODY MASS INDEX: 49.09 KG/M2 | HEIGHT: 61 IN | WEIGHT: 260 LBS

## 2020-11-24 DIAGNOSIS — R51.9 INTRACTABLE HEADACHE, UNSPECIFIED CHRONICITY PATTERN, UNSPECIFIED HEADACHE TYPE: Primary | ICD-10-CM

## 2020-11-24 PROCEDURE — U0003 INFECTIOUS AGENT DETECTION BY NUCLEIC ACID (DNA OR RNA); SEVERE ACUTE RESPIRATORY SYNDROME CORONAVIRUS 2 (SARS-COV-2) (CORONAVIRUS DISEASE [COVID-19]), AMPLIFIED PROBE TECHNIQUE, MAKING USE OF HIGH THROUGHPUT TECHNOLOGIES AS DESCRIBED BY CMS-2020-01-R: HCPCS | Performed by: NURSE PRACTITIONER

## 2020-11-24 PROCEDURE — 99213 OFFICE O/P EST LOW 20 MIN: CPT | Performed by: PHYSICIAN ASSISTANT

## 2020-11-24 PROCEDURE — 1036F TOBACCO NON-USER: CPT | Performed by: PHYSICIAN ASSISTANT

## 2020-11-24 PROCEDURE — 3008F BODY MASS INDEX DOCD: CPT | Performed by: PHYSICIAN ASSISTANT

## 2020-11-26 LAB — SARS-COV-2 RNA SPEC QL NAA+PROBE: NOT DETECTED

## 2020-12-30 DIAGNOSIS — J45.909 ASTHMA AFFECTING PREGNANCY, ANTEPARTUM: ICD-10-CM

## 2020-12-30 DIAGNOSIS — O99.519 ASTHMA AFFECTING PREGNANCY, ANTEPARTUM: ICD-10-CM

## 2021-01-13 ENCOUNTER — TELEMEDICINE (OUTPATIENT)
Dept: FAMILY MEDICINE CLINIC | Facility: CLINIC | Age: 31
End: 2021-01-13
Payer: COMMERCIAL

## 2021-01-13 VITALS — BODY MASS INDEX: 48.15 KG/M2 | HEIGHT: 61 IN | TEMPERATURE: 98.9 F | WEIGHT: 255 LBS

## 2021-01-13 DIAGNOSIS — R05.8 COUGH WITH EXPOSURE TO COVID-19 VIRUS: Primary | ICD-10-CM

## 2021-01-13 DIAGNOSIS — Z20.822 COUGH WITH EXPOSURE TO COVID-19 VIRUS: ICD-10-CM

## 2021-01-13 DIAGNOSIS — R05.8 COUGH WITH EXPOSURE TO COVID-19 VIRUS: ICD-10-CM

## 2021-01-13 DIAGNOSIS — Z20.822 COUGH WITH EXPOSURE TO COVID-19 VIRUS: Primary | ICD-10-CM

## 2021-01-13 PROCEDURE — 99213 OFFICE O/P EST LOW 20 MIN: CPT | Performed by: PHYSICIAN ASSISTANT

## 2021-01-13 PROCEDURE — U0003 INFECTIOUS AGENT DETECTION BY NUCLEIC ACID (DNA OR RNA); SEVERE ACUTE RESPIRATORY SYNDROME CORONAVIRUS 2 (SARS-COV-2) (CORONAVIRUS DISEASE [COVID-19]), AMPLIFIED PROBE TECHNIQUE, MAKING USE OF HIGH THROUGHPUT TECHNOLOGIES AS DESCRIBED BY CMS-2020-01-R: HCPCS | Performed by: PHYSICIAN ASSISTANT

## 2021-01-13 PROCEDURE — 3725F SCREEN DEPRESSION PERFORMED: CPT | Performed by: PHYSICIAN ASSISTANT

## 2021-01-13 NOTE — PROGRESS NOTES
Virtual Regular Visit      Assessment/Plan:    Problem List Items Addressed This Visit     None      Visit Diagnoses     Cough with exposure to COVID-19 virus    -  Primary    Relevant Orders    Novel Coronavirus (COVID-19), PCR [facility] - Collected at   [facility] [address] [address] or [facility]        Day 2 of COVID-19 symptoms  Symptoms started 01/11/2021    -patient will go for a COVID-19 test today  -I did discuss monoclonal antibody treatment which is something she would be interested in doing since her BMI is 48  -continue increase clear liquids  -ibuprofen as needed  -over-the-counter generic Sudafed and Mucinex for cough as needed as package directs  -advised to call with any increasing symptoms especially if she is noticing any shortness of breath when ambulating  Go to the ER if needed  -continue with the rescue inhaler as needed  She does not need a refill    M*Modal software was used to dictate this note  It may contain errors with dictating incorrect words/spelling  Please contact provider directly for any questions  Reason for visit is   Chief Complaint   Patient presents with    COVID-19     bodyaches, cough, sore throat    Virtual Regular Visit        Encounter provider Lynne Carrasco PA-C    Provider located at [address] [place] [address],[address]   [address] [address]      Recent Visits  No visits were found meeting these conditions  Showing recent visits within past 7 days and meeting all other requirements     Today's Visits  Date Type Provider Dept   01/13/21 Telemedicine Tuyet Moulton PA-C [facility]   Showing today's visits and meeting all other requirements     Future Appointments  No visits were found meeting these conditions  Showing future appointments within next 150 days and meeting all other requirements        The patient was identified by name and date of birth   Aimee Nay Karen Mchugh was informed that this is a telemedicine visit and that the visit is being conducted through [address] [place] [address] and patient was informed that this is not a secure, HIPAA-compliant platform  She agrees to proceed     My office door was closed  No one else was in the room  She acknowledged consent and understanding of privacy and security of the video platform  The patient has agreed to participate and understands they can discontinue the visit at any time  Patient is aware this is a billable service  Kacey Jernigan is a 26 y o  female virtual visit for COVID-19 exposure in symptoms   Patient presents today for virtual visit for cough, body aches, sore throat and nausea that started 2 days ago 2021  She states that her son who is 15years old develops symptoms 3 days ago in tested positive  He is 15years old  She denies any loss of smell or taste, shortness of breath when walking, vomiting or diarrhea  No fever  She has been utilizing ibuprofen 400 mg as package directs  She has been drinking a lot of fluids  She does notice a postnasal drip  No nasal congestion  She [place] is not aware of any known COVID-19 contacts  She is not aware of her son being in contact with anyone with COVID either         Past Medical History:   Diagnosis Date    Asthma     Female infertility     PCOS related    Obesity, morbid, BMI 40 0-49 9 ([facility] [address] )     Polycystic ovary syndrome     Varicella        Past Surgical History:   Procedure Laterality Date     SECTION      x1       Current Outpatient Medications   Medication Sig Dispense Refill    acetaminophen (TYLENOL) 325 mg tablet Take 2 tablets (650 mg total) by mouth every 6 (six) hours as needed for headaches 30 tablet 0    Advair Diskus 100-50 MCG/DOSE inhaler INHALE 1 PUFF 2 (TWO) TIMES A DAY RINSE MOUTH AFTER USE 60 each 1    albuterol (PROAIR HFA) 90 mcg/act inhaler Inhale 2 puffs every 6 (six) hours as needed for wheezing 8 5 g 0    hydrocortisone 1 % cream APPLY 1 APPLICATION TOPICALLY 4 (FOUR) TIMES A DAY AS NEEDED (APPLY TO PERINEUM) 28 35 g 5    Prenatal Vit-Fe Fumarate-FA (PRENATAL VITAMIN PO) Take by mouth       No current facility-administered medications for this visit  No Known Allergies    Review of Systems   Constitutional: Negative for chills and fever  HENT: Positive for postnasal drip and sore throat  Negative for congestion  Respiratory: Positive for cough  Negative for shortness of breath and wheezing (She states she has not noticed aggravation of her asthma)  Video Exam    Vitals:    01/13/21 0837   Temp: 98 9 °F (37 2 °C)   Weight: 116 kg (255 lb)   Height: 5' 1" (1 549 m)       Physical Exam  Constitutional:       General: She is not in acute distress  Appearance: Normal appearance  She is not ill-appearing, toxic-appearing or diaphoretic  HENT:      Head: Normocephalic and atraumatic  Neck:      Musculoskeletal: Neck supple  Pulmonary:      Effort: Pulmonary effort is normal  No respiratory distress (Patient does have a mild dry cough throughout the evaluation  She does not have any shortness of breath when talking in full sentences)  Neurological:      General: No focal deficit present  Mental Status: She is alert  Psychiatric:         Mood and Affect: Mood normal          Behavior: Behavior normal          Thought Content: Thought content normal          Judgment: Judgment normal           I spent 14 minutes directly with the patient during this visit      [address] acknowledges that she has consented to an online visit or consultation   She understands that the online visit is based solely on information provided by her, and that, in the absence of a face-to-face physical evaluation by the physician, the diagnosis she receives is both limited and provisional in terms of accuracy and completeness  This is not intended to replace a full medical face-to-face evaluation by the physician  Eliezer Aase understands and accepts these terms

## 2021-01-15 ENCOUNTER — TELEPHONE (OUTPATIENT)
Dept: FAMILY MEDICINE CLINIC | Facility: CLINIC | Age: 31
End: 2021-01-15

## 2021-01-15 NOTE — TELEPHONE ENCOUNTER
Phone call from Elizondo, states she had a virtual visit & was tested for COVID  She did not get her results yet, but, she needs a note for work  Please advise re: rtw date  Pt can be reached at [phone]  [employer]

## 2021-01-16 LAB — SARS-COV-2 RNA SPEC QL NAA+PROBE: DETECTED

## 2021-01-18 NOTE — TELEPHONE ENCOUNTER
Her COVID test is positive and she is day 7 of COVID-19 symptoms  She needs a  Virtual follow-up appointment on Thursday which will be day 10 of symptoms to determine return to work  She needs to remain in isolation in the meantime    You can provide her with a note at this time indicating  return to work status pending appointment a new complete the date of the appointment thank you

## 2021-01-19 ENCOUNTER — TELEPHONE (OUTPATIENT)
Dept: FAMILY MEDICINE CLINIC | Facility: CLINIC | Age: 31
End: 2021-01-19

## 2021-01-19 ENCOUNTER — TELEPHONE (OUTPATIENT)
Dept: BARIATRICS | Facility: CLINIC | Age: 31
End: 2021-01-19

## 2021-01-19 NOTE — TELEPHONE ENCOUNTER
SW CAME UP TO LET ME KNOW PATIENT HAD TESTED POSITIVE, CALLED PATIENT TO LET HER KNOW WE WOULD HAVE TO RESCHEDULE - THERE WAS NO ANSWER LEFT MSG

## 2021-01-19 NOTE — TELEPHONE ENCOUNTER
Please review appointment scheduled on January 21st at 10:30 a m    She was COVID positive so she should probably be a virtual   Thanks

## 2021-01-21 ENCOUNTER — TELEMEDICINE (OUTPATIENT)
Dept: FAMILY MEDICINE CLINIC | Facility: CLINIC | Age: 31
End: 2021-01-21
Payer: COMMERCIAL

## 2021-01-21 VITALS — TEMPERATURE: 97.8 F | WEIGHT: 250 LBS | HEIGHT: 61 IN | BODY MASS INDEX: 47.2 KG/M2

## 2021-01-21 DIAGNOSIS — U07.1 COVID-19 VIRUS INFECTION: Primary | ICD-10-CM

## 2021-01-21 PROCEDURE — 99213 OFFICE O/P EST LOW 20 MIN: CPT | Performed by: PHYSICIAN ASSISTANT

## 2021-01-21 PROCEDURE — 1036F TOBACCO NON-USER: CPT | Performed by: PHYSICIAN ASSISTANT

## 2021-01-21 PROCEDURE — 3008F BODY MASS INDEX DOCD: CPT | Performed by: PHYSICIAN ASSISTANT

## 2021-01-21 NOTE — PROGRESS NOTES
Virtual Regular Visit      Assessment/Plan:    Problem List Items Addressed This Visit        Other    COVID-19 virus infection - Primary         day 10 of COVID-19 symptoms     -I did recommend utilizing the rescue inhaler as needed for cough   -call with any increasing symptoms  - she will return to work on Sunday January 24th  - I did recommend she make a routine appointment with Dr Essence Baugh was used to dictate this note  It may contain errors with dictating incorrect words/spelling  Please contact provider directly for any questions  Reason for visit is   Chief Complaint   Patient presents with    COVID-19    Virtual Regular Visit        Encounter provider Trisha Rachel PA-C    Provider located at [address] [place] [address] [place] [facility] [address]  [address] [facility]   [address]  [address] [place] [id number]      Recent Visits  Date Type Provider Dept   01/19/21 Telephone Trisha Rachel PA-C [facility]   01/15/21 Telephone Chaparrita Forte [facility]   Showing recent visits within past 7 days and meeting all other requirements     Today's Visits  Date Type Provider Dept   01/21/21 Telemedicine Tuyet Moulton PA-C [facility]   Showing today's visits and meeting all other requirements     Future Appointments  No visits were found meeting these conditions  Showing future appointments within next 150 days and meeting all other requirements        The patient was identified by name and date of birth  Yuli Arun was informed that this is a telemedicine visit and that the visit is being conducted through [address] [place] [address] and patient was informed that this is not a secure, HIPAA-compliant platform  She agrees to proceed     My office door was closed  No one else was in the room  She acknowledged consent and understanding of privacy and security of the video platform   The patient has agreed to participate and understands they can discontinue the visit at any time  Patient is aware this is a billable service  Stephany Xie is a 26 y o  female   Virtual visit for return to work status for COVID-19   Patient presents today for a follow-up for COVID-19  She is now day 10 of onset of symptoms  She states overall she continues to notice improvement of her symptoms  She never developed a fever  She still has an intermittent cough, postnasal drip, nasal congestion  She does have an inhaler at home but she has not tried utilizing it for her cough  She denies any production with her cough  She denies loss of sense of smell or taste, headache, sore throat, nausea, vomiting, diarrhea  She does feel as though she would be ready to return to work on   Past Medical History:   Diagnosis Date    Asthma     Female infertility     PCOS related    Obesity, morbid, BMI 40 0-49 9 ([address] )     Polycystic ovary syndrome     Varicella        Past Surgical History:   Procedure Laterality Date     SECTION      x1       Current Outpatient Medications   Medication Sig Dispense Refill    acetaminophen (TYLENOL) 325 mg tablet Take 2 tablets (650 mg total) by mouth every 6 (six) hours as needed for headaches 30 tablet 0    Advair Diskus 100-50 MCG/DOSE inhaler INHALE 1 PUFF 2 (TWO) TIMES A DAY RINSE MOUTH AFTER USE 60 each 1    albuterol (PROAIR HFA) 90 mcg/act inhaler Inhale 2 puffs every 6 (six) hours as needed for wheezing 8 5 g 0    hydrocortisone 1 % cream APPLY 1 APPLICATION TOPICALLY 4 (FOUR) TIMES A DAY AS NEEDED (APPLY TO PERINEUM) 28 35 g 5     No current facility-administered medications for this visit  No Known Allergies    Review of Systems   Constitutional: Negative for chills and fever  HENT: Positive for congestion and postnasal drip  Negative for rhinorrhea  Respiratory: Positive for cough  Negative for shortness of breath  Gastrointestinal: Negative for diarrhea, nausea and vomiting  Video Exam    Vitals:    01/21/21 0928   Temp: 97 8 °F (36 6 °C)   Weight: 113 kg (250 lb)   Height: 5' 1" (1 549 m)       Physical Exam  Constitutional:       General: She is not in acute distress  Appearance: Normal appearance  She is not ill-appearing, toxic-appearing or diaphoretic  HENT:      Head: Normocephalic and atraumatic  Neck:      Musculoskeletal: Neck supple  Pulmonary:      Effort: Pulmonary effort is normal  No respiratory distress (  Patient did have a mild intermittent dry cough during the phone visit  She was able to talk in full sentences without being short of breath )  Skin:     General: Skin is warm  Neurological:      General: No focal deficit present  Mental Status: She is alert  Psychiatric:         Mood and Affect: Mood normal           I spent 9 minutes directly with the patient during this visit      [address] acknowledges that she has consented to an online visit or consultation  She understands that the online visit is based solely on information provided by her, and that, in the absence of a face-to-face physical evaluation by the physician, the diagnosis she receives is both limited and provisional in terms of accuracy and completeness  This is not intended to replace a full medical face-to-face evaluation by the physician  Omi Mcdonald understands and accepts these terms

## 2021-01-21 NOTE — LETTER
January 21, 2021     Patient: Yuli Dias   YOB: 1990   Date of Visit: 1/21/2021       To Whom it May Concern:    Yuli Dias is under my professional care  She was seen in my office on 1/21/2021  She  Is able to return to work on 01/24/2021  If you have any questions or concerns, please don't hesitate to call           Sincerely,          Tuyet Moulton PA-C        CC: No Recipients

## 2021-02-09 ENCOUNTER — OFFICE VISIT (OUTPATIENT)
Dept: BARIATRICS | Facility: CLINIC | Age: 31
End: 2021-02-09

## 2021-02-09 VITALS
RESPIRATION RATE: 18 BRPM | SYSTOLIC BLOOD PRESSURE: 116 MMHG | BODY MASS INDEX: 48.45 KG/M2 | TEMPERATURE: 97.4 F | HEIGHT: 62 IN | WEIGHT: 263.3 LBS | HEART RATE: 88 BPM | DIASTOLIC BLOOD PRESSURE: 84 MMHG

## 2021-02-09 DIAGNOSIS — Z01.810 PREOP CARDIOVASCULAR EXAM: ICD-10-CM

## 2021-02-09 DIAGNOSIS — E66.01 OBESITY, CLASS III, BMI 40-49.9 (MORBID OBESITY) (HCC): Primary | ICD-10-CM

## 2021-02-09 PROCEDURE — RECHECK: Performed by: SOCIAL WORKER

## 2021-02-09 NOTE — PROGRESS NOTES
Bariatric Nutrition Assessment Note    Type of surgery    Preop  Surgery Date: TBD  Surgeon: Dr Kane Hampton  26 y o   female     Wt with BMI of 25: 135 lbs  Pre-Op Excess Wt: 128 lbs  Blood pressure 116/84, pulse 88, temperature (!) 97 4 °F (36 3 °C), temperature source Temporal, resp  rate 18, height 5' 1 75" (1 568 m), weight 119 kg (263 lb 4 8 oz), currently breastfeeding      Weight History   Onset of Obesity: Adult  Family history of obesity: No  Wt Loss Attempts: Counseling with  MD  Exercise  Keto   Maximum Wt Lost: 75 lbs    Review of History and Medications   Past Medical History:   Diagnosis Date    Asthma     Female infertility     PCOS related    Obesity, morbid, BMI 40 0-49 9 ([address] )     Polycystic ovary syndrome     Varicella      Past Surgical History:   Procedure Laterality Date     SECTION      x1     Social History     Socioeconomic History    Marital status: /Civil Union     Spouse name: None    Number of children: None    Years of education: None    Highest education level: None   Occupational History    None   Social Needs    Financial resource strain: None    Food insecurity     Worry: None     Inability: None    Transportation needs     Medical: None     Non-medical: None   Tobacco Use    Smoking status: Never Smoker    Smokeless tobacco: Never Used    Tobacco comment: no passive smoke exposure   Substance and Sexual Activity    Alcohol use: No    Drug use: No    Sexual activity: Yes     Partners: Male     Birth control/protection: None   Lifestyle    Physical activity     Days per week: None     Minutes per session: None    Stress: None   Relationships    Social connections     Talks on phone: None     Gets together: None     Attends Restoration service: None     Active member of club or organization: None     Attends meetings of clubs or organizations: None     Relationship status: None    Intimate partner violence     Fear of current or ex partner: None     Emotionally abused: None     Physically abused: None     Forced sexual activity: None   Other Topics Concern    None   Social History Narrative    None       Current Outpatient Medications:     acetaminophen (TYLENOL) 325 mg tablet, Take 2 tablets (650 mg total) by mouth every 6 (six) hours as needed for headaches, Disp: 30 tablet, Rfl: 0    Advair Diskus 100-50 MCG/DOSE inhaler, INHALE 1 PUFF 2 (TWO) TIMES A DAY RINSE MOUTH AFTER USE, Disp: 60 each, Rfl: 1    albuterol (PROAIR HFA) 90 mcg/act inhaler, Inhale 2 puffs every 6 (six) hours as needed for wheezing, Disp: 8 5 g, Rfl: 0    hydrocortisone 1 % cream, APPLY 1 APPLICATION TOPICALLY 4 (FOUR) TIMES A DAY AS NEEDED (APPLY TO PERINEUM), Disp: 28 35 g, Rfl: 5  Food Intake and Lifestyle Assessment   Food Intake Assessment completed via usual diet recall (works 1:30pm-midnight)  Breakfast: skips  Snack: 0   Lunch: sandwich on 647 bread or tuna on spinach  Snack: nuts, cheese, pepperoni  Dinner: may make chicken or ground beef, or hard boiled eggs  Snack: 0  Beverage intake: water, sugar free beverages and coffee  Protein supplement: 0  Estimated protein intake per day: 70 gm  Estimated fluid intake per day: >90 oz  Meals eaten away from home: once per week now or less  Typical meal pattern: 2-3 meals per day and 2-3 snacks per day  Eating Behaviors: Consumption of high calorie/ high fat foods, Large portion sizes, Frequent snacking/ grazing and Emotional eating  Food allergies or intolerances: No Known Allergies  Cultural or Restoration considerations: N/A    Physical Assessment  Physical Activity  Types of exercise: None-work is physical  Current physical limitations: none    Psychosocial Assessment   Support systems: spouse  Socioeconomic factors: works full time, lives with spouse and 5 children    Nutrition Diagnosis  Diagnosis: Overweight / Obesity (NC-3 3)  Related to: Physical inactivity and Excessive energy intake  As Evidenced by: BMI >25     Nutrition Prescription: Recommend the following diet  Low fat, Low sugar and High protein    Interventions and Teaching   Discussed pre-op and post-op nutrition guidelines  Patient educated and handouts provided  Surgical changes to stomach / GI  Capacity of post-surgery stomach  Diet progression  Adequate hydration  Sugar and fat restriction to decrease "dumping syndrome"  Fat restriction to decrease steatorrhea  Expected weight loss  Weight loss plateaus/ possibility of weight regain  Exercise  Suggestions for pre-op diet  Nutrition considerations after surgery  Protein supplements  Meal planning and preparation  Appropriate carbohydrate, protein, and fat intake, and food/fluid choices to maximize safe weight loss, nutrient intake, and tolerance   Dietary and lifestyle changes  Possible problems with poor eating habits  Intuitive eating  Techniques for self monitoring and keeping daily food journal  Potential for food intolerance after surgery, and ways to deal with them including: lactose intolerance, nausea, reflux, vomiting, diarrhea, food intolerance, appetite changes, gas  Vitamin / Mineral supplementation of Multivitamin with minerals and Vitamin D  Post-operative pregnancy guidelines    Education provided to: patient    Barriers to learning: No barriers identified  Readiness to change: preparation    Prior research on procedure: discussed with provider and friends or family    Comprehension: verbalizes understanding     Expected Compliance: good  Recommendations  Pt is an appropriate candidate for surgery   Yes    Evaluation / Monitoring  Dietitian to Monitor: Eating pattern as discussed Body weight Lab values Physical activity    Goals  Food journal, Exercise 30 minutes 5 times per week and Complete lession plans 1-6    Time Spent:   1 Hour

## 2021-02-09 NOTE — PROGRESS NOTES
Bariatric Behavioral Health Evaluation    Presenting Problem: Blu Alexander [birth year] is here to improve health, increase activity and prevent family disease  She gained weight after each of her 5 children and has not been able to lose the weight  She is insulin resistant at this time  She started the process of research and weight loss surgery before her last pregnancy  She hasd a positive experience with using Medically supervised weight loss under the supervision of her family doctor and the keto diet but stalled after a year  She is supported by her  and friends  She has a negative history of Mental Health treatment, negative for Drug and Alcohol treatment  Is the patient seeking Bariatric Surgery Eval? Yes      Realizes Post- Op Requirements? Yes     Pre-morbid level of function and history of present illness: Currently Renée Wynn is insulin resistant  Psychiatric/Psychological Treatment Diagnosis: None    Outpatient Counselor No    Psychiatrist No     Have you had Inpatient Treatment? No    Drug and/or Alcohol treatment history: No    Tobacco History: No    Family Constellation No mental health history for any family members    Domestic Violence No      Additional comments/stressors related to family/relationships/peer support Patient lives with spouse and their 5 children  Physical/Psychological Assessment:     Appearance: appropriate  Sociability: average  Affect: appropriate  Mood: calm  Thought Process: coherent  Speech: normal  Content: no impairment  Orientation: person  Yes , place  Yes , time  Yes , normal attention span  Yes , normal memory  Yes   and normal judgement  Yes   Insight: emotional  good    Risk Assessment:     none    Recommendations: Recommended for surgery  yes        Risk of Harm to Self or Others:   none noted during evaluation     Observation:     Interviews :  this interview only    Access to weapons : not reported     Based on the previous information, the client presents the following risk of harm to self or others: low     Note :  Patient presented for behavioral health evaluation for the bariatric program    No reported Mental Health diagnosis  Negative history for  therapy and Psychiatry  No reported  Drug and/or Alcohol abuse or treatment  No reported tobacco use  Patient educated regarding the impact of nicotine and alcohol on the post surgery bariatric patient  Patient has a negative family history of tobacco and alcohol addiction  Patient meets criteria for surgery at this time and is referred to the bariatric surgeon  Mardee Ganser, MSW, LCSW  _____________________________      BARIATRIC SURGERY EDUCATION CHECKLIST     I have received education related to my bariatric surgery process and understand:     Patients may be required to complete a psychiatric evaluation and receive clearance for surgery from their psychiatrist      Patients who undergo weight loss surgery are at higher risk of increased mental health concerns and suicide attempts  Patients may be required to complete a full substance abuse evaluation and then complete all treatment recommendations prior to surgery  If diagnosis of abuse/dependence results, patient may be required to remain sober for one (1) year before having bariatric surgery  Patients on psychiatric medications should check with their provider to discuss psychiatric medications and the changes in absorption  Patient should discuss all time release medications with provider and take all medications as prescribed  The recommendation is that there is no use of  any tobacco products, Hookah or  vapes for the bariatric post-operation patient  Bariatric surgery patients should not consume alcohol as a post-operative patient as it may increase risk of numerous health conditions including but not limited to alcohol abuse and ulcers       There is a possibility of weight regain if patient does not follow all program guidelines and recommendations  Bariatric surgery patients should exercise thirty (30) to sixty (60) minutes per day to maintain post-surgical weight loss  Research indicates that bariatric patients are more successful when they see a therapist for up to two (2) years post-op  Patients will follow all medical and dietary recommendations provided  Patient will keep all scheduled appointments and follow up with their physician for a minimum of five (5) years  Patient will take all vitamins as recommended  Post-operative vitamins are life-long  Patient reviewed Bariatric Surgery Education Checklist and agrees they have received education on these issues

## 2021-02-12 ENCOUNTER — OFFICE VISIT (OUTPATIENT)
Dept: FAMILY MEDICINE CLINIC | Facility: CLINIC | Age: 31
End: 2021-02-12
Payer: COMMERCIAL

## 2021-02-12 VITALS
WEIGHT: 264.8 LBS | HEART RATE: 104 BPM | BODY MASS INDEX: 48.73 KG/M2 | RESPIRATION RATE: 16 BRPM | OXYGEN SATURATION: 96 % | HEIGHT: 62 IN | DIASTOLIC BLOOD PRESSURE: 84 MMHG | TEMPERATURE: 97.8 F | SYSTOLIC BLOOD PRESSURE: 110 MMHG

## 2021-02-12 DIAGNOSIS — E66.01 CLASS 3 SEVERE OBESITY DUE TO EXCESS CALORIES WITHOUT SERIOUS COMORBIDITY WITH BODY MASS INDEX (BMI) OF 45.0 TO 49.9 IN ADULT (HCC): ICD-10-CM

## 2021-02-12 DIAGNOSIS — E66.01 MORBID OBESITY WITH BMI OF 45.0-49.9, ADULT (HCC): ICD-10-CM

## 2021-02-12 DIAGNOSIS — Z00.00 HEALTHCARE MAINTENANCE: Primary | ICD-10-CM

## 2021-02-12 PROBLEM — J45.909 ASTHMA AFFECTING PREGNANCY, ANTEPARTUM: Status: RESOLVED | Noted: 2020-04-06 | Resolved: 2021-02-12

## 2021-02-12 PROBLEM — Z98.891 HISTORY OF CESAREAN SECTION: Status: RESOLVED | Noted: 2019-08-06 | Resolved: 2021-02-12

## 2021-02-12 PROBLEM — O34.219 HISTORY OF CESAREAN DELIVERY, ANTEPARTUM: Status: RESOLVED | Noted: 2020-05-05 | Resolved: 2021-02-12

## 2021-02-12 PROBLEM — O09.213 SUPERVISION OF PREGNANCY WITH HISTORY OF PRE-TERM LABOR IN THIRD TRIMESTER: Status: RESOLVED | Noted: 2019-08-06 | Resolved: 2021-02-12

## 2021-02-12 PROBLEM — O34.219 VBAC (VAGINAL BIRTH AFTER CESAREAN): Status: RESOLVED | Noted: 2020-07-19 | Resolved: 2021-02-12

## 2021-02-12 PROBLEM — O99.519 ASTHMA AFFECTING PREGNANCY, ANTEPARTUM: Status: RESOLVED | Noted: 2020-04-06 | Resolved: 2021-02-12

## 2021-02-12 PROBLEM — E66.813 CLASS 3 SEVERE OBESITY DUE TO EXCESS CALORIES WITHOUT SERIOUS COMORBIDITY WITH BODY MASS INDEX (BMI) OF 45.0 TO 49.9 IN ADULT (HCC): Status: ACTIVE | Noted: 2021-02-12

## 2021-02-12 PROBLEM — O99.820 GROUP B STREPTOCOCCUS CARRIER, ANTEPARTUM: Status: RESOLVED | Noted: 2020-07-03 | Resolved: 2021-02-12

## 2021-02-12 PROBLEM — O99.213 MATERNAL MORBID OBESITY, ANTEPARTUM, THIRD TRIMESTER (HCC): Status: RESOLVED | Noted: 2020-02-10 | Resolved: 2021-02-12

## 2021-02-12 PROBLEM — O09.893 HISTORY OF PREMATURE DELIVERY, CURRENTLY PREGNANT, THIRD TRIMESTER: Status: RESOLVED | Noted: 2020-03-17 | Resolved: 2021-02-12

## 2021-02-12 PROCEDURE — 99395 PREV VISIT EST AGE 18-39: CPT | Performed by: FAMILY MEDICINE

## 2021-02-12 NOTE — ASSESSMENT & PLAN NOTE
Referral to bariatric surgery    She is an acceptable risk for the proposed procedure if she is cleared from cardiologist

## 2021-02-12 NOTE — PROGRESS NOTES
Assessment/Plan:  Healthcare maintenance  It was discussed about immunizations, diet, exercise and safety measures  Class 3 severe obesity due to excess calories without serious comorbidity with body mass index (BMI) of 45 0 to 49 9 in adult [facility])  Referral to bariatric surgery  She is an acceptable risk for the proposed procedure if she is cleared from cardiologist        Diagnoses and all orders for this visit:    Healthcare maintenance    Class 3 severe obesity due to excess calories without serious comorbidity with body mass index (BMI) of 45 0 to 49 9 in adult [facility])  -     Ambulatory referral to Bariatric Surgery; Future    Morbid obesity with BMI of 45 0-49 9, adult ([facility] [address] )          There are no Patient Instructions on file for this visit  Return if symptoms worsen or fail to improve  Subjective:      Patient ID: Jeannette Billings is a 26 y o  female  Chief Complaint   Patient presents with    Obesity     ref for bariatric surgery       She is here today for wellness exam and for clearance for her bariatric surgery  She has been trying to diet and exercise but she has been losing weight  She stated that her weight is affecting her life and her health and she is interested and bariatric surgery  The following portions of the patient's history were reviewed and updated as appropriate: allergies, current medications, past family history, past medical history, past social history, past surgical history and problem list     Review of Systems   Constitutional: Negative for chills and fever  HENT: Negative for trouble swallowing  Eyes: Negative for visual disturbance  Respiratory: Negative for cough and shortness of breath  Cardiovascular: Negative for chest pain, palpitations and leg swelling  Gastrointestinal: Negative for abdominal pain, constipation and diarrhea  Endocrine: Negative for cold intolerance and heat intolerance     Genitourinary: Negative for difficulty urinating and dysuria  Musculoskeletal: Negative for gait problem  Skin: Negative for rash  Neurological: Negative for dizziness, tremors, seizures and headaches  Hematological: Negative for adenopathy  Psychiatric/Behavioral: Negative for behavioral problems  Current Outpatient Medications   Medication Sig Dispense Refill    Advair Diskus 100-50 MCG/DOSE inhaler INHALE 1 PUFF 2 (TWO) TIMES A DAY RINSE MOUTH AFTER USE 60 each 1    albuterol (PROAIR HFA) 90 mcg/act inhaler Inhale 2 puffs every 6 (six) hours as needed for wheezing 8 5 g 0    hydrocortisone 1 % cream APPLY 1 APPLICATION TOPICALLY 4 (FOUR) TIMES A DAY AS NEEDED (APPLY TO PERINEUM) 28 35 g 5    acetaminophen (TYLENOL) 325 mg tablet Take 2 tablets (650 mg total) by mouth every 6 (six) hours as needed for headaches (Patient not taking: Reported on 2/12/2021) 30 tablet 0     No current facility-administered medications for this visit  Objective:    /84 (BP Location: Left arm, Patient Position: Sitting, Cuff Size: Large)   Pulse 104   Temp 97 8 °F (36 6 °C) (Tympanic)   Resp 16   Ht 5' 1 75" (1 568 m)   Wt 120 kg (264 lb 12 8 oz)   SpO2 96%   PF 96 L/min   BMI 48 83 kg/m²        Physical Exam  Vitals signs and nursing note reviewed  Constitutional:       Appearance: She is well-developed  HENT:      Head: Normocephalic and atraumatic  Eyes:      Pupils: Pupils are equal, round, and reactive to light  Neck:      Musculoskeletal: Normal range of motion and neck supple  Cardiovascular:      Rate and Rhythm: Normal rate and regular rhythm  Heart sounds: Normal heart sounds  Pulmonary:      Effort: Pulmonary effort is normal       Breath sounds: Normal breath sounds  Abdominal:      General: Bowel sounds are normal       Palpations: Abdomen is soft  Musculoskeletal: Normal range of motion  Lymphadenopathy:      Cervical: No cervical adenopathy  Skin:     General: Skin is warm     Neurological: Mental Status: She is alert and oriented to person, place, and time  Cranial Nerves: No cranial nerve deficit  Wilfrido Soto MD BMI Counseling: Body mass index is 48 83 kg/m²  The BMI is above normal  Nutrition recommendations include reducing portion sizes, decreasing overall calorie intake and 3-5 servings of fruits/vegetables daily  Exercise recommendations include moderate aerobic physical activity for 150 minutes/week

## 2021-02-22 DIAGNOSIS — J45.909 ASTHMA AFFECTING PREGNANCY, ANTEPARTUM: ICD-10-CM

## 2021-02-22 DIAGNOSIS — O99.519 ASTHMA AFFECTING PREGNANCY, ANTEPARTUM: ICD-10-CM

## 2021-02-26 ENCOUNTER — OFFICE VISIT (OUTPATIENT)
Dept: BARIATRICS | Facility: CLINIC | Age: 31
End: 2021-02-26
Payer: COMMERCIAL

## 2021-02-26 VITALS
BODY MASS INDEX: 46.93 KG/M2 | WEIGHT: 255 LBS | SYSTOLIC BLOOD PRESSURE: 112 MMHG | HEIGHT: 62 IN | TEMPERATURE: 96.9 F | DIASTOLIC BLOOD PRESSURE: 82 MMHG | RESPIRATION RATE: 20 BRPM | HEART RATE: 114 BPM

## 2021-02-26 DIAGNOSIS — E66.01 OBESITY, CLASS III, BMI 40-49.9 (MORBID OBESITY) (HCC): Primary | ICD-10-CM

## 2021-02-26 PROCEDURE — 99204 OFFICE O/P NEW MOD 45 MIN: CPT | Performed by: SURGERY

## 2021-02-26 NOTE — PROGRESS NOTES
BARIATRIC CONSULT-INITIAL - BARIATRIC SURGERY  Kellen Duffy 26 y o  female MRN: [id number]  Unit/Bed#:  Encounter: [id number]      HPI:  Kellen Duffy is a 26 y o  female who presents with morbid obesity to discuss weight loss options  Review of Systems    Historical Information   Past Medical History:   Diagnosis Date    Asthma     Female infertility     PCOS related    Obesity, morbid, BMI 40 0-49 9 ([address] )     Polycystic ovary syndrome     Varicella      Past Surgical History:   Procedure Laterality Date     SECTION      x1     Social History   Social History     Substance and Sexual Activity   Alcohol Use No     Social History     Substance and Sexual Activity   Drug Use No     Social History     Tobacco Use   Smoking Status Never Smoker   Smokeless Tobacco Never Used   Tobacco Comment    no passive smoke exposure     Family History: non-contributory    Meds/Allergies   all medications and allergies reviewed  No Known Allergies    Objective       Current Vitals:   Blood Pressure: 112/82 (21 105)  Pulse: (!) 114 (21 1056)  Temperature: (!) 96 9 °F (36 1 °C) (21 105)  Temp Source: Tympanic (21 1056)  Respirations: 20 (21 1056)  Height: 5' 1 7" (156 7 cm) (21 105)  Weight - Scale: 116 kg (255 lb) (21)  Body mass index is 47 09 kg/m²  Invasive Devices     None                 Physical Exam    Lab Results: I have personally reviewed pertinent lab results  Imaging: I have personally reviewed pertinent reports  EKG, Pathology, and Other Studies: I have personally reviewed pertinent reports  Code Status: [unfilled]  Advance Directive and Living Will:      Power of :    POLST:      Assessment/PLAN:            Patient has a long history of morbid obesity and is presenting to discuss the surgical weight loss options   Despite the patient best efforts patient was unable to lose any meaningful or sustainable weight using nonsurgical means  We had a long discussion regarding all the surgical weight-loss options at our disposal at this point and reviewed the risks and benefits of each procedure in details as it relates to her age, BMI and medical conditions  Patient elected to undergo RYGB    Risks and benefits were explained to the patient  We also discussed the importance and need of a preoperative workup to make sure that the patient can undergo the procedure safely  Preoperative workup includes sleep apnea screening, cardiac evaluation, nutrition/psych and preoperative EGD  Risks and benefits of all the preoperative diagnostic tests were discussed with the patient including but not limited to the upper endoscopy  Alternatives to surgery and alternative forms of surgery were also explained  Postsurgical commitment and aftercare programs were discussed and explained to the patient in details   In terms of comorbidities patient suffers mostly of   Past Medical History:   Diagnosis Date    Asthma     Female infertility     PCOS related    Obesity, morbid, BMI 40 0-49 9 ([facility] [address] )     Polycystic ovary syndrome     Varicella        I informed the patient that the rate of resolution of comorbid conditions following weight loss surgery is between 60 and 90% depending on the severity of the specific medical condition  I discussed and educated the patient regarding the different components of our multidisciplinary program and the importance of compliance and follow-up in the postoperative period  All questions answered  Patient understands risks and benefits  A videotape of the procedure was also shown to the patient  After showing the video we discussed all the technical aspects of the procedure and also the potential complications including but not limited to gastrointestinal perforation, leak, obstruction, stricture and hemorrhage   I spent 45 min with the patient more than 50% of the time was spent educating the patient and coordinating care

## 2021-03-04 ENCOUNTER — OFFICE VISIT (OUTPATIENT)
Dept: BARIATRICS | Facility: CLINIC | Age: 31
End: 2021-03-04

## 2021-03-04 VITALS — BODY MASS INDEX: 46.78 KG/M2 | WEIGHT: 254.2 LBS | HEIGHT: 62 IN

## 2021-03-04 DIAGNOSIS — E66.01 OBESITY, CLASS III, BMI 40-49.9 (MORBID OBESITY) (HCC): Primary | ICD-10-CM

## 2021-03-04 PROCEDURE — RECHECK

## 2021-03-04 NOTE — PROGRESS NOTES
Bariatric Nutrition Follow Up Note    Type of surgery    Preop  Surgery Date: TBD  Surgeon: Dr Kane Hampton  26 y o   female     Wt with BMI of 25: 135 lbs  Pre-Op Excess Wt: 128 lbs  Height 5' 1 7" (1 567 m), weight 115 kg (254 lb 3 2 oz), currently breastfeeding  Body mass index is 46 95 kg/m²      Weight History   Onset of Obesity: Adult  Family history of obesity: No  Wt Loss Attempts: Counseling with  MD  Exercise  Keto   Maximum Wt Lost: 75 lbs    Review of History and Medications   Past Medical History:   Diagnosis Date    Asthma     Female infertility     PCOS related    Obesity, morbid, BMI 40 0-49 9 ([address] )     Polycystic ovary syndrome     Varicella      Past Surgical History:   Procedure Laterality Date     SECTION      x1     Social History     Socioeconomic History    Marital status: /Civil Union     Spouse name: Not on file    Number of children: Not on file    Years of education: Not on file    Highest education level: Not on file   Occupational History    Not on file   Social Needs    Financial resource strain: Not on file    Food insecurity     Worry: Not on file     Inability: Not on file    Transportation needs     Medical: Not on file     Non-medical: Not on file   Tobacco Use    Smoking status: Never Smoker    Smokeless tobacco: Never Used    Tobacco comment: no passive smoke exposure   Substance and Sexual Activity    Alcohol use: No    Drug use: No    Sexual activity: Yes     Partners: Male     Birth control/protection: None   Lifestyle    Physical activity     Days per week: Not on file     Minutes per session: Not on file    Stress: Not on file   Relationships    Social connections     Talks on phone: Not on file     Gets together: Not on file     Attends Confucianism service: Not on file     Active member of club or organization: Not on file     Attends meetings of clubs or organizations: Not on file Relationship status: Not on file    Intimate partner violence     Fear of current or ex partner: Not on file     Emotionally abused: Not on file     Physically abused: Not on file     Forced sexual activity: Not on file   Other Topics Concern    Not on file   Social History Narrative    Not on file       Current Outpatient Medications:     acetaminophen (TYLENOL) 325 mg tablet, Take 2 tablets (650 mg total) by mouth every 6 (six) hours as needed for headaches (Patient not taking: Reported on 2/12/2021), Disp: 30 tablet, Rfl: 0    Advair Diskus 100-50 MCG/DOSE inhaler, INHALE 1 PUFF 2 (TWO) TIMES A DAY RINSE MOUTH AFTER USE, Disp: 60 each, Rfl: 1    albuterol (PROAIR HFA) 90 mcg/act inhaler, Inhale 2 puffs every 6 (six) hours as needed for wheezing, Disp: 8 5 g, Rfl: 0    hydrocortisone 1 % cream, APPLY 1 APPLICATION TOPICALLY 4 (FOUR) TIMES A DAY AS NEEDED (APPLY TO PERINEUM), Disp: 28 35 g, Rfl: 5  Food Intake and Lifestyle Assessment   Food Intake Assessment completed via usual diet recall (works 1:30pm-midnight)  Breakfast: eggs with meat  Snack: 0   Lunch: protein shake  Snack: little cheese, or sunflower seeds  Dinner: chicken, vegetables, salad  Snack: 0  Beverage intake: water, sugar free beverages and coffee  Protein supplement: 1 protein shake and 1 protein water  Estimated protein intake per day: 70 gm  Estimated fluid intake per day: >90 oz  Meals eaten away from home: once per week now or less  Typical meal pattern: 2-3 meals per day and 2-3 snacks per day  Eating Behaviors: Consumption of high calorie/ high fat foods, Large portion sizes, Frequent snacking/ grazing and Emotional eating  Food allergies or intolerances: No Known Allergies  Cultural or Muslim considerations: N/A    Physical Assessment  Physical Activity  Types of exercise: None-work is physical-loads and offloads bags  Current physical limitations: none    Psychosocial Assessment   Support systems: spouse  Socioeconomic factors: works full time, lives with spouse and 5 children    Nutrition Diagnosis  Diagnosis: Overweight / Obesity (NC-3 3)  Related to: Physical inactivity and Excessive energy intake  As Evidenced by: BMI >25     Nutrition Prescription: Recommend the following diet  Low fat, Low sugar and High protein    Interventions and Teaching   Discussed pre-op and post-op nutrition guidelines  Patient educated and handouts provided  Surgical changes to stomach / GI  Capacity of post-surgery stomach  Diet progression  Adequate hydration  Sugar and fat restriction to decrease "dumping syndrome"  Fat restriction to decrease steatorrhea  Expected weight loss  Weight loss plateaus/ possibility of weight regain  Exercise  Suggestions for pre-op diet  Nutrition considerations after surgery  Protein supplements  Meal planning and preparation  Appropriate carbohydrate, protein, and fat intake, and food/fluid choices to maximize safe weight loss, nutrient intake, and tolerance   Dietary and lifestyle changes  Possible problems with poor eating habits  Intuitive eating  Techniques for self monitoring and keeping daily food journal  Potential for food intolerance after surgery, and ways to deal with them including: lactose intolerance, nausea, reflux, vomiting, diarrhea, food intolerance, appetite changes, gas  Vitamin / Mineral supplementation of Multivitamin with minerals and Vitamin D  Post-operative pregnancy guidelines    Education provided to: patient    Barriers to learning: No barriers identified  Readiness to change: preparation    Prior research on procedure: discussed with provider and friends or family    Comprehension: verbalizes understanding     Expected Compliance: good  Recommendations  Pt is an appropriate candidate for surgery   Yes    Evaluation / Monitoring  Dietitian to Monitor: Eating pattern as discussed Body weight Lab values Physical activity    Goals  Food journal, Exercise 30 minutes 5 times per week and Complete lession plans 1-6    Time Spent:   30 minutes

## 2021-03-05 ENCOUNTER — CONSULT (OUTPATIENT)
Dept: CARDIOLOGY CLINIC | Facility: CLINIC | Age: 31
End: 2021-03-05
Payer: COMMERCIAL

## 2021-03-05 VITALS
WEIGHT: 254 LBS | SYSTOLIC BLOOD PRESSURE: 124 MMHG | HEART RATE: 83 BPM | BODY MASS INDEX: 46.74 KG/M2 | HEIGHT: 62 IN | DIASTOLIC BLOOD PRESSURE: 86 MMHG

## 2021-03-05 DIAGNOSIS — U07.1 COVID-19 VIRUS INFECTION: ICD-10-CM

## 2021-03-05 DIAGNOSIS — E66.01 OBESITY, CLASS III, BMI 40-49.9 (MORBID OBESITY) (HCC): ICD-10-CM

## 2021-03-05 DIAGNOSIS — Z01.810 PREOP CARDIOVASCULAR EXAM: ICD-10-CM

## 2021-03-05 DIAGNOSIS — E28.2 PCOS (POLYCYSTIC OVARIAN SYNDROME): ICD-10-CM

## 2021-03-05 DIAGNOSIS — E66.01 CLASS 3 SEVERE OBESITY DUE TO EXCESS CALORIES WITHOUT SERIOUS COMORBIDITY WITH BODY MASS INDEX (BMI) OF 45.0 TO 49.9 IN ADULT (HCC): Primary | ICD-10-CM

## 2021-03-05 PROCEDURE — 1036F TOBACCO NON-USER: CPT

## 2021-03-05 PROCEDURE — 99204 OFFICE O/P NEW MOD 45 MIN: CPT

## 2021-03-05 PROCEDURE — 93000 ELECTROCARDIOGRAM COMPLETE: CPT

## 2021-03-05 NOTE — PROGRESS NOTES
Cardiology Consultation   MD Diego Mcgee MD Bruno Lies, DO, MD Jose C Louise DO, Farzaneh Gil DO, [facility] [place]  -------------------------------------------------------------------  [facility]  [address], [place] [address]  [phone]  [phone]  03/05/21  Sami Samuel  YOB: 1990   MRN: [id number]      Referring Physician: Josh Wang MD  [address] [facility]  [address] [facility],  [address] [facility]     HPI:  I am seeing this patient in cardiology consultation for:   Preoperative cardiac risk assessment    Sami Samuel is a 26 y o  female with  No prior history of heart disease, she does have polycystic ovarian syndrome, morbid obesity, recent COVID-23 virus infection who presents today for initial cardiac evaluation for perioperative cardiac risk assessment prior to undergoing gastric bypass surgery  She states that she is active, [place], able to do 10,000 steps per day via her fitness watch  Never gets chest pain shortness of breath dyspnea on exertion syncope presyncope paroxysmal nocturnal dyspnea or diaphoresis  ECG today is normal     Review of Systems   Constitutional: Negative for chills and fever  HENT: Negative for facial swelling and sore throat  Eyes: Negative for visual disturbance  Respiratory: Negative for cough, chest tightness, shortness of breath and wheezing  Cardiovascular: Negative for chest pain, palpitations and leg swelling  Gastrointestinal: Negative for abdominal pain, blood in stool, constipation, diarrhea, nausea and vomiting  Endocrine: Negative for cold intolerance and heat intolerance  Genitourinary: Negative for decreased urine volume, difficulty urinating, dysuria and hematuria  Musculoskeletal: Negative for arthralgias, back pain and myalgias  Skin: Negative for rash  Neurological: Negative for dizziness, syncope, weakness and numbness  Psychiatric/Behavioral: Negative for agitation, behavioral problems and confusion  The patient is not nervous/anxious  OBJECTIVE  Vitals:    03/05/21 1256   BP: 124/86   Pulse: 83       Physical Exam     General appearance: alert and oriented, in no acute distress, obese female  Head: Normocephalic, without obvious abnormality, atraumatic  Eyes: conjunctivae/corneas clear  Anicteric  Neck: no adenopathy, no carotid bruit, no JVD  Lungs: clear to auscultation bilaterally  Heart: regular rate and rhythm, S1, S2 normal, no murmur, no click, rub or gallop  Abdomen:  Obese, soft, non-tender; bowel sounds normal; no masses,  no organomegaly  Extremities: extremities normal, warm and well-perfused; no cyanosis, clubbing, or edema  Skin: Skin color, texture, turgor normal  No rashes or lesions     EKG:  No results found for this visit on 03/05/21  IMPRESSION:  1   preoperative cardiac risk assessment   2  Morbid obesity   3  Polycystic ovarian syndrome  4   recent COVID 19, has made a full recovery    DISCUSSION/RECOMMENDATIONS:    at this time, she has no prior history of any cardiovascular disease, has a normal electrocardiogram, normal cardiac physical examination with no signs to suggest underlying structural heart disease  She does not have any symptoms of angina, and she has a good exercise capacity, easily able to perform over 5 Mets of activity  Overall she will be a low risk from cardiac standpoint for her upcoming bariatric surgery       Her ECG is normal    Would not pursue echocardiogram or stress testing in her situation   Blood pressure is stable on no antihypertensive medications   I wished her the best of luck with her upcoming bariatric surgery    Rudolph Palacio DO [facility] [place]  --------------------------------------------------------------------------------  TREADMILL STRESS  No results found for this or any previous visit    ----------------------------------------------------------------------------------------------  NUCLEAR STRESS TEST: No results found for this or any previous visit  No results found for this or any previous visit     --------------------------------------------------------------------------------  CATH:  No results found for this or any previous visit   --------------------------------------------------------------------------------  ECHO:   No results found for this or any previous visit  No results found for this or any previous visit   --------------------------------------------------------------------------------  HOLTER  No results found for this or any previous visit   --------------------------------------------------------------------------------  CAROTIDS  No results found for this or any previous visit       Diagnoses and all orders for this visit:    Class 3 severe obesity due to excess calories without serious comorbidity with body mass index (BMI) of 45 0 to 49 9 in adult [facility])    Preop cardiovascular exam  -     Ambulatory referral to Cardiology  -     POCT ECG    Obesity, Class III, BMI 40-49 9 (morbid obesity) ([place] [address] )  -     Ambulatory referral to Cardiology    COVID-19 virus infection    PCOS (polycystic ovarian syndrome)       ======================================================    Past Medical History:   Diagnosis Date    Asthma     Female infertility     PCOS related    Obesity, morbid, BMI 40 0-49 9 ([place] [address] )     Polycystic ovary syndrome     Varicella      Past Surgical History:   Procedure Laterality Date     SECTION      x1         Medications  Current Outpatient Medications   Medication Sig Dispense Refill    albuterol (PROAIR HFA) 90 mcg/act inhaler Inhale 2 puffs every 6 (six) hours as needed for wheezing 8 5 g 0    hydrocortisone 1 % cream APPLY 1 APPLICATION TOPICALLY 4 (FOUR) TIMES A DAY AS NEEDED (APPLY TO PERINEUM) 28 35 g 5    acetaminophen (TYLENOL) 325 mg tablet Take 2 tablets (650 mg total) by mouth every 6 (six) hours as needed for headaches (Patient not taking: Reported on 2/12/2021) 30 tablet 0    Advair Diskus 100-50 MCG/DOSE inhaler INHALE 1 PUFF 2 (TWO) TIMES A DAY RINSE MOUTH AFTER USE (Patient not taking: Reported on 3/5/2021) 60 each 1     No current facility-administered medications for this visit           No Known Allergies    Social History     Socioeconomic History    Marital status: /Civil Union     Spouse name: Not on file    Number of children: Not on file    Years of education: Not on file    Highest education level: Not on file   Occupational History    Not on file   Social Needs    Financial resource strain: Not on file    Food insecurity     Worry: Not on file     Inability: Not on file    Transportation needs     Medical: Not on file     Non-medical: Not on file   Tobacco Use    Smoking status: Never Smoker    Smokeless tobacco: Never Used    Tobacco comment: no passive smoke exposure   Substance and Sexual Activity    Alcohol use: No    Drug use: No    Sexual activity: Yes     Partners: Male     Birth control/protection: None   Lifestyle    Physical activity     Days per week: Not on file     Minutes per session: Not on file    Stress: Not on file   Relationships    Social connections     Talks on phone: Not on file     Gets together: Not on file     Attends Hoahaoism service: Not on file     Active member of club or organization: Not on file     Attends meetings of clubs or organizations: Not on file     Relationship status: Not on file    Intimate partner violence     Fear of current or ex partner: Not on file     Emotionally abused: Not on file     Physically abused: Not on file     Forced sexual activity: Not on file   Other Topics Concern    Not on file   Social History Narrative    Not on file        Family History   Problem Relation Age of Onset    Breast cancer Family     Lung cancer Family     Fibroids Mother     Heart disease Mother     HIV Mother     Kidney disease Mother     Asthma Son     Lung cancer Maternal Grandmother     Diabetes Maternal Grandmother     Prostate cancer Maternal Grandfather     Breast cancer Maternal Grandfather     Diabetes Maternal Grandfather     No Known Problems Father        Lab Results   Component Value Date    WBC 12 43 (H) 07/18/2020    HGB 10 8 (L) 07/18/2020    HCT 33 6 (L) 07/18/2020    MCV 88 07/18/2020     07/18/2020      No results found for: SODIUM, K, CL, CO2, BUN, CREATININE, GLUC, CALCIUM   No results found for: HGBA1C   No results found for: CHOL  No results found for: HDL  No results found for: LDLCALC  No results found for: TRIG  No results found for: CHOLHDL   No results found for: INR, PROTIME       Patient Active Problem List    Diagnosis Date Noted   [address] maintenance 02/12/2021    Class 3 severe obesity due to excess calories without serious comorbidity with body mass index (BMI) of 45 0 to 49 9 in adult ([facility] ) 02/12/2021    COVID-19 virus infection 01/21/2021    Elevated glucose tolerance test 05/28/2020    Exposure to SARS-associated coronavirus 04/03/2020    Vitamin D deficiency 08/06/2019    BMI 40 0-44 9, adult ([facility] ) 08/06/2019    Eczema, dyshidrotic 07/08/2019    Fracture, tooth 04/09/2016    Overweight 07/29/2015    PCOS (polycystic ovarian syndrome) 04/20/2015    Irregular menstrual cycle 04/09/2015    Acne 03/05/2015    Carpal tunnel syndrome 01/05/2015       Portions of the record may have been created with voice recognition software  Occasional wrong word or "sound a like" substitutions may have occurred due to the inherent limitations of voice recognition software  Read the chart carefully and recognize, using context, where substitutions have occurred          Koko Fagan DO, [facility]  3/5/2021 1:27 PM

## 2021-03-25 DIAGNOSIS — Z23 ENCOUNTER FOR IMMUNIZATION: ICD-10-CM

## 2021-03-30 ENCOUNTER — PREP FOR PROCEDURE (OUTPATIENT)
Dept: BARIATRICS | Facility: CLINIC | Age: 31
End: 2021-03-30

## 2021-03-30 DIAGNOSIS — E66.01 OBESITY, CLASS III, BMI 40-49.9 (MORBID OBESITY) (HCC): Primary | ICD-10-CM

## 2021-04-01 ENCOUNTER — OFFICE VISIT (OUTPATIENT)
Dept: BARIATRICS | Facility: CLINIC | Age: 31
End: 2021-04-01

## 2021-04-01 VITALS — HEIGHT: 62 IN | BODY MASS INDEX: 46.78 KG/M2 | WEIGHT: 254.2 LBS

## 2021-04-01 DIAGNOSIS — E66.01 OBESITY, CLASS III, BMI 40-49.9 (MORBID OBESITY) (HCC): Primary | ICD-10-CM

## 2021-04-01 DIAGNOSIS — Z01.818 PREOPERATIVE TESTING: ICD-10-CM

## 2021-04-01 PROCEDURE — RECHECK

## 2021-04-01 NOTE — PROGRESS NOTES
Bariatric Nutrition Follow Up Note    Type of surgery    Preop  Surgery Date: TBD  Surgeon: Dr Christiano Castro  26 y o   female     Wt with BMI of 25: 135 lbs  Pre-Op Excess Wt: 128 lbs  Height 5' 1 7" (1 567 m), weight 115 kg (254 lb 3 2 oz), currently breastfeeding  Body mass index is 46 95 kg/m²      Weight History   Onset of Obesity: Adult  Family history of obesity: No  Wt Loss Attempts: Counseling with  MD  Exercise  Keto   Maximum Wt Lost: 75 lbs    Review of History and Medications   Past Medical History:   Diagnosis Date    Asthma     Female infertility     PCOS related    Obesity, morbid, BMI 40 0-49 9 ([address] )     Polycystic ovary syndrome     Varicella      Past Surgical History:   Procedure Laterality Date     SECTION      x1     Social History     Socioeconomic History    Marital status: /Civil Union     Spouse name: Not on file    Number of children: Not on file    Years of education: Not on file    Highest education level: Not on file   Occupational History    Not on file   Social Needs    Financial resource strain: Not on file    Food insecurity     Worry: Not on file     Inability: Not on file    Transportation needs     Medical: Not on file     Non-medical: Not on file   Tobacco Use    Smoking status: Never Smoker    Smokeless tobacco: Never Used    Tobacco comment: no passive smoke exposure   Substance and Sexual Activity    Alcohol use: No    Drug use: No    Sexual activity: Yes     Partners: Male     Birth control/protection: None   Lifestyle    Physical activity     Days per week: Not on file     Minutes per session: Not on file    Stress: Not on file   Relationships    Social connections     Talks on phone: Not on file     Gets together: Not on file     Attends Judaism service: Not on file     Active member of club or organization: Not on file     Attends meetings of clubs or organizations: Not on file Relationship status: Not on file    Intimate partner violence     Fear of current or ex partner: Not on file     Emotionally abused: Not on file     Physically abused: Not on file     Forced sexual activity: Not on file   Other Topics Concern    Not on file   Social History Narrative    Not on file       Current Outpatient Medications:     acetaminophen (TYLENOL) 325 mg tablet, Take 2 tablets (650 mg total) by mouth every 6 (six) hours as needed for headaches (Patient not taking: Reported on 2/12/2021), Disp: 30 tablet, Rfl: 0    Advair Diskus 100-50 MCG/DOSE inhaler, INHALE 1 PUFF 2 (TWO) TIMES A DAY RINSE MOUTH AFTER USE (Patient not taking: Reported on 3/5/2021), Disp: 60 each, Rfl: 1    albuterol (PROAIR HFA) 90 mcg/act inhaler, Inhale 2 puffs every 6 (six) hours as needed for wheezing, Disp: 8 5 g, Rfl: 0    hydrocortisone 1 % cream, APPLY 1 APPLICATION TOPICALLY 4 (FOUR) TIMES A DAY AS NEEDED (APPLY TO PERINEUM), Disp: 28 35 g, Rfl: 5  Food Intake and Lifestyle Assessment   Food Intake Assessment completed via usual diet recall (works 1:30pm-midnight)  Breakfast: breakfast muffins-no flour  Snack: 0   Lunch: protein shake  Snack: little cheese, or sunflower seeds  Dinner: chicken, vegetables, salad  Snack: 0  Beverage intake: water, sugar free beverages and coffee  Protein supplement: 1 protein shake and 1 protein water  Estimated protein intake per day: 70 gm  Estimated fluid intake per day: >90 oz  Meals eaten away from home: once per week now or less  Typical meal pattern: 2-3 meals per day and 2-3 snacks per day  Eating Behaviors: Consumption of high calorie/ high fat foods, Large portion sizes, Frequent snacking/ grazing and Emotional eating  Food allergies or intolerances: No Known Allergies  Cultural or Quaker considerations: N/A    Physical Assessment  Physical Activity  Types of exercise: None-work is physical-loads and offloads bags  Current physical limitations: none    Psychosocial Assessment   Support systems: spouse  Socioeconomic factors: works full time, lives with spouse and 5 children    Nutrition Diagnosis  Diagnosis: Overweight / Obesity (NC-3 3)  Related to: Physical inactivity and Excessive energy intake  As Evidenced by: BMI >25     Nutrition Prescription: Recommend the following diet  Low fat, Low sugar and High protein    Interventions and Teaching   Discussed pre-op and post-op nutrition guidelines  Patient educated and handouts provided  Surgical changes to stomach / GI  Capacity of post-surgery stomach  Diet progression  Adequate hydration  Sugar and fat restriction to decrease "dumping syndrome"  Fat restriction to decrease steatorrhea  Expected weight loss  Weight loss plateaus/ possibility of weight regain  Exercise  Suggestions for pre-op diet  Nutrition considerations after surgery  Protein supplements  Meal planning and preparation  Appropriate carbohydrate, protein, and fat intake, and food/fluid choices to maximize safe weight loss, nutrient intake, and tolerance   Dietary and lifestyle changes  Possible problems with poor eating habits  Intuitive eating  Techniques for self monitoring and keeping daily food journal  Potential for food intolerance after surgery, and ways to deal with them including: lactose intolerance, nausea, reflux, vomiting, diarrhea, food intolerance, appetite changes, gas  Vitamin / Mineral supplementation of Multivitamin with minerals and Vitamin D  Post-operative pregnancy guidelines    Education provided to: patient    Barriers to learning: No barriers identified  Readiness to change: preparation    Prior research on procedure: discussed with provider and friends or family    Comprehension: verbalizes understanding     Expected Compliance: good  Recommendations  Pt is an appropriate candidate for surgery   Yes    Evaluation / Monitoring  Dietitian to Monitor: Eating pattern as discussed Body weight Lab values Physical activity    Goals  Food journal, Exercise 30 minutes 5 times per week and Complete lession plans 1-6    Time Spent:   30 minutes

## 2021-04-12 ENCOUNTER — ANESTHESIA EVENT (OUTPATIENT)
Dept: GASTROENTEROLOGY | Facility: HOSPITAL | Age: 31
End: 2021-04-12

## 2021-04-14 ENCOUNTER — HOSPITAL ENCOUNTER (OUTPATIENT)
Dept: GASTROENTEROLOGY | Facility: HOSPITAL | Age: 31
Setting detail: OUTPATIENT SURGERY
Discharge: HOME | End: 2021-04-14
Attending: SURGERY
Payer: COMMERCIAL

## 2021-04-14 ENCOUNTER — ANESTHESIA (OUTPATIENT)
Dept: GASTROENTEROLOGY | Facility: HOSPITAL | Age: 31
End: 2021-04-14

## 2021-04-14 VITALS
TEMPERATURE: 97.6 F | HEART RATE: 78 BPM | DIASTOLIC BLOOD PRESSURE: 57 MMHG | HEIGHT: 61 IN | WEIGHT: 245 LBS | OXYGEN SATURATION: 97 % | RESPIRATION RATE: 20 BRPM | BODY MASS INDEX: 46.26 KG/M2 | SYSTOLIC BLOOD PRESSURE: 105 MMHG

## 2021-04-14 DIAGNOSIS — E66.01 OBESITY, CLASS III, BMI 40-49.9 (MORBID OBESITY) (HCC): ICD-10-CM

## 2021-04-14 DIAGNOSIS — O99.519 ASTHMA AFFECTING PREGNANCY, ANTEPARTUM: ICD-10-CM

## 2021-04-14 DIAGNOSIS — J45.909 ASTHMA AFFECTING PREGNANCY, ANTEPARTUM: ICD-10-CM

## 2021-04-14 LAB
EXT PREGNANCY TEST URINE: NEGATIVE
EXT. CONTROL: NORMAL

## 2021-04-14 PROCEDURE — 88305 TISSUE EXAM BY PATHOLOGIST: CPT | Performed by: PATHOLOGY

## 2021-04-14 PROCEDURE — 81025 URINE PREGNANCY TEST: CPT | Performed by: ANESTHESIOLOGY

## 2021-04-14 PROCEDURE — 43239 EGD BIOPSY SINGLE/MULTIPLE: CPT | Performed by: SURGERY

## 2021-04-14 RX ORDER — SODIUM CHLORIDE 9 MG/ML
125 INJECTION, SOLUTION INTRAVENOUS CONTINUOUS
Status: DISCONTINUED | OUTPATIENT
Start: 2021-04-14 | End: 2021-04-18 | Stop reason: HOSPADM

## 2021-04-14 RX ORDER — LIDOCAINE HYDROCHLORIDE 20 MG/ML
INJECTION, SOLUTION EPIDURAL; INFILTRATION; INTRACAUDAL; PERINEURAL AS NEEDED
Status: DISCONTINUED | OUTPATIENT
Start: 2021-04-14 | End: 2021-04-14

## 2021-04-14 RX ORDER — PROPOFOL 10 MG/ML
INJECTION, EMULSION INTRAVENOUS AS NEEDED
Status: DISCONTINUED | OUTPATIENT
Start: 2021-04-14 | End: 2021-04-14

## 2021-04-14 RX ADMIN — SODIUM CHLORIDE 125 ML/HR: 0.9 INJECTION, SOLUTION INTRAVENOUS at 09:39

## 2021-04-14 RX ADMIN — LIDOCAINE HYDROCHLORIDE 100 MG: 20 INJECTION, SOLUTION EPIDURAL; INFILTRATION; INTRACAUDAL; PERINEURAL at 10:32

## 2021-04-14 RX ADMIN — PROPOFOL 80 MG: 10 INJECTION, EMULSION INTRAVENOUS at 10:35

## 2021-04-14 RX ADMIN — PROPOFOL 120 MG: 10 INJECTION, EMULSION INTRAVENOUS at 10:32

## 2021-04-14 NOTE — DISCHARGE INSTRUCTIONS
Upper Endoscopy   WHAT YOU NEED TO KNOW:   An upper endoscopy is also called an upper gastrointestinal (GI) endoscopy, or an esophagogastroduodenoscopy (EGD)  You may feel bloated, gassy, or have some abdominal discomfort after your procedure  Your throat may be sore for 24 to 36 hours  You may burp or pass gas from air that is still inside your body  DISCHARGE INSTRUCTIONS:   Call 911 if:   · You have sudden chest pain or trouble breathing  Seek care immediately if:   · You feel dizzy or faint  · You have trouble swallowing  · You have severe throat pain  · Your bowel movements are very dark or black  · Your abdomen is hard and firm and you have severe pain  · You vomit blood  Contact your healthcare provider if:   · You feel full or bloated and cannot burp or pass gas  · You have not had a bowel movement for 3 days after your procedure  · You have neck pain  · You have a fever or chills  · You have nausea or are vomiting  · You have a rash or hives  · You have questions or concerns about your endoscopy  Relieve a sore throat:  Suck on throat lozenges or crushed ice  Gargle with a small amount of warm salt water  Mix 1 teaspoon of salt and 1 cup of warm water to make salt water  Relieve gas and discomfort from bloating:  Lie on your right side with a heating pad on your abdomen  Take short walks to help pass gas  Eat small meals until bloating is relieved  Rest after your procedure:  Do not drive or make important decisions until the day after your procedure  Return to your normal activity as directed  You can usually return to work the day after your procedure  Follow up with your healthcare provider as directed:  Write down your questions so you remember to ask them during your visits  © Copyright [address] Information is for End User's use only and may not be sold, redistributed or otherwise used for commercial purposes   All illustrations and images included in CareNotes® are the copyrighted property of A D A M , Inc  or Jason Fischer   The above information is an  only  It is not intended as medical advice for individual conditions or treatments  Talk to your doctor, nurse or pharmacist before following any medical regimen to see if it is safe and effective for you

## 2021-04-14 NOTE — H&P
H&P EXAM - Outpatient Endoscopy  AL [address] [facility] GI LAB INTRA   Camelia Ada 26 y o  female MRN: [id number]  Unit/Bed#:  Encounter: [id number]        Impression: Morbid obesity    Plan:Upper endoscopy and a biopsy to rule out H  Pylori    Chief Complaint: Morbid obesity and preoperative endoscopy    Physical Exam: Normal not in acute distress   Chest: Clear to auscultation   Heart: Normal S1 and S2

## 2021-04-14 NOTE — ANESTHESIA PREPROCEDURE EVALUATION
Procedure:  EGD    Relevant Problems   ANESTHESIA (within normal limits)      CARDIO (within normal limits)      ENDO (within normal limits)      PULMONARY   (+) Asthma      Other   (+) BMI 40 0-44 9, adult (HCC)   (+) COVID-19 virus infection - 1/21 resolved        Physical Exam    Airway    Mallampati score: I  TM Distance: >3 FB  Neck ROM: full     Dental   No notable dental hx     Cardiovascular  Cardiovascular exam normal    Pulmonary  Pulmonary exam normal     Other Findings        Anesthesia Plan  ASA Score- 3     Anesthesia Type- IV sedation with anesthesia with ASA Monitors  Additional Monitors:   Airway Plan:           Plan Factors-    Chart reviewed  Induction- intravenous  Postoperative Plan-     Informed Consent- Anesthetic plan and risks discussed with patient

## 2021-05-04 ENCOUNTER — APPOINTMENT (OUTPATIENT)
Dept: LAB | Age: 31
End: 2021-05-04
Payer: COMMERCIAL

## 2021-05-04 DIAGNOSIS — E66.01 OBESITY, CLASS III, BMI 40-49.9 (MORBID OBESITY) (HCC): ICD-10-CM

## 2021-05-04 DIAGNOSIS — Z01.818 PREOPERATIVE TESTING: ICD-10-CM

## 2021-05-04 LAB
ALBUMIN SERPL BCP-MCNC: 3.9 G/DL (ref 3.5–5)
ALP SERPL-CCNC: 76 U/L (ref 46–116)
ALT SERPL W P-5'-P-CCNC: 22 U/L (ref 12–78)
ANION GAP SERPL CALCULATED.3IONS-SCNC: 3 MMOL/L (ref 4–13)
AST SERPL W P-5'-P-CCNC: 9 U/L (ref 5–45)
BILIRUB SERPL-MCNC: 0.37 MG/DL (ref 0.2–1)
BUN SERPL-MCNC: 13 MG/DL (ref 5–25)
CALCIUM SERPL-MCNC: 9.1 MG/DL (ref 8.3–10.1)
CHLORIDE SERPL-SCNC: 108 MMOL/L (ref 100–108)
CHOLEST SERPL-MCNC: 179 MG/DL (ref 50–200)
CO2 SERPL-SCNC: 28 MMOL/L (ref 21–32)
CREAT SERPL-MCNC: 0.67 MG/DL (ref 0.6–1.3)
ERYTHROCYTE [DISTWIDTH] IN BLOOD BY AUTOMATED COUNT: 13 % (ref 11.6–15.1)
GFR SERPL CREATININE-BSD FRML MDRD: 117 ML/MIN/1.73SQ M
GLUCOSE P FAST SERPL-MCNC: 79 MG/DL (ref 65–99)
HCT VFR BLD AUTO: 42.9 % (ref 34.8–46.1)
HDLC SERPL-MCNC: 62 MG/DL
HGB BLD-MCNC: 13.9 G/DL (ref 11.5–15.4)
LDLC SERPL CALC-MCNC: 95 MG/DL (ref 0–100)
MCH RBC QN AUTO: 29.6 PG (ref 26.8–34.3)
MCHC RBC AUTO-ENTMCNC: 32.4 G/DL (ref 31.4–37.4)
MCV RBC AUTO: 92 FL (ref 82–98)
NONHDLC SERPL-MCNC: 117 MG/DL
PLATELET # BLD AUTO: 264 THOUSANDS/UL (ref 149–390)
PMV BLD AUTO: 11.1 FL (ref 8.9–12.7)
POTASSIUM SERPL-SCNC: 4.1 MMOL/L (ref 3.5–5.3)
PROT SERPL-MCNC: 7.1 G/DL (ref 6.4–8.2)
RBC # BLD AUTO: 4.69 MILLION/UL (ref 3.81–5.12)
SODIUM SERPL-SCNC: 139 MMOL/L (ref 136–145)
TRIGL SERPL-MCNC: 110 MG/DL
TSH SERPL DL<=0.05 MIU/L-ACNC: 1.2 UIU/ML (ref 0.36–3.74)
WBC # BLD AUTO: 6.33 THOUSAND/UL (ref 4.31–10.16)

## 2021-05-04 PROCEDURE — 80061 LIPID PANEL: CPT

## 2021-05-04 PROCEDURE — 36415 COLL VENOUS BLD VENIPUNCTURE: CPT

## 2021-05-04 PROCEDURE — 84443 ASSAY THYROID STIM HORMONE: CPT

## 2021-05-04 PROCEDURE — 80053 COMPREHEN METABOLIC PANEL: CPT

## 2021-05-04 PROCEDURE — 85027 COMPLETE CBC AUTOMATED: CPT

## 2021-05-10 ENCOUNTER — TELEPHONE (OUTPATIENT)
Dept: BARIATRICS | Facility: CLINIC | Age: 31
End: 2021-05-10

## 2021-05-10 NOTE — TELEPHONE ENCOUNTER
I submitted for prior auth  now I'm being told the policy is 6 months  When I called to check @ the beginning of the program, I was told no requirements  Another time I was told she didn't have any benefits, patient called and she was told the same, no program requirements  The company handling the prior-authorization is [employer] & they are requiring the 6 months  LMOM for patient on Friday 5-7-21 advising so  Patient needs to come in for 3 more months to complete now  Awaiting a call from the patient

## 2021-05-18 ENCOUNTER — OFFICE VISIT (OUTPATIENT)
Dept: BARIATRICS | Facility: CLINIC | Age: 31
End: 2021-05-18

## 2021-05-18 DIAGNOSIS — E66.01 OBESITY, CLASS III, BMI 40-49.9 (MORBID OBESITY) (HCC): Primary | ICD-10-CM

## 2021-05-18 PROCEDURE — RECHECK: Performed by: SOCIAL WORKER

## 2021-05-18 NOTE — PROGRESS NOTES
Dory Lin  is a 26 y o    female    :  1990  [address]   Current patient status: on track for her wt checks     Discussion around change in work hours and the impact on her food plans  Currently working mornings 4 a to 2 p Thursday through   Discussed her hopes and fears about surgery related to the delay after pre-authorization delayed surgery for another 3 months  She does not feel that motional triggers have been a problem but that  her portion sizes were too big and she was eating too fast   We reviewed the workflow:   Goals of eating slowly  Next appointment is scheduled for  with Stephanie Fernandez Res, MSW, LCSW  _____________________________

## 2021-06-08 DIAGNOSIS — O99.519 ASTHMA AFFECTING PREGNANCY, ANTEPARTUM: ICD-10-CM

## 2021-06-08 DIAGNOSIS — J45.909 ASTHMA AFFECTING PREGNANCY, ANTEPARTUM: ICD-10-CM

## 2021-06-23 ENCOUNTER — OFFICE VISIT (OUTPATIENT)
Dept: BARIATRICS | Facility: CLINIC | Age: 31
End: 2021-06-23

## 2021-06-23 VITALS — BODY MASS INDEX: 48.75 KG/M2 | HEIGHT: 61 IN | WEIGHT: 258.2 LBS

## 2021-06-23 DIAGNOSIS — E66.01 OBESITY, CLASS III, BMI 40-49.9 (MORBID OBESITY) (HCC): Primary | ICD-10-CM

## 2021-06-23 PROCEDURE — RECHECK

## 2021-06-23 NOTE — PROGRESS NOTES
Bariatric Nutrition Follow Up Note    Type of surgery    Preop  Surgery Date: TBD  Surgeon: Dr Monica Ness  33 y o   female     Wt with BMI of 25: 135 lbs  Pre-Op Excess Wt: 128 lbs  Height 5' 1" (1 549 m), weight 117 kg (258 lb 3 2 oz), currently breastfeeding  Body mass index is 48 79 kg/m²  Weight History   Onset of Obesity: Adult  Family history of obesity: No  Wt Loss Attempts: Counseling with  MD  Exercise  Keto   Maximum Wt Lost: 75 lbs    Review of History and Medications   Past Medical History:   Diagnosis Date    Asthma     Female infertility     PCOS related    Obesity, morbid, BMI 40 0-49 9 ([address] )     Polycystic ovary syndrome     Varicella      Past Surgical History:   Procedure Laterality Date     SECTION      x1     Social History     Socioeconomic History    Marital status: /Civil Union     Spouse name: Not on file    Number of children: Not on file    Years of education: Not on file    Highest education level: Not on file   Occupational History    Not on file   Tobacco Use    Smoking status: Never Smoker    Smokeless tobacco: Never Used    Tobacco comment: no passive smoke exposure   Vaping Use    Vaping Use: Never used   Substance and Sexual Activity    Alcohol use: No    Drug use: No    Sexual activity: Yes     Partners: Male     Birth control/protection: None   Other Topics Concern    Not on file   Social History Narrative    Not on file     Social Determinants of Health     Financial Resource Strain:     Difficulty of Paying Living Expenses:    Food Insecurity:     Worried About Running Out of Food in the Last Year:     Ran Out of Food in the Last Year:    Transportation Needs:     Lack of Transportation (Medical):      Lack of Transportation (Non-Medical):    Physical Activity:     Days of Exercise per Week:     Minutes of Exercise per Session:    Stress:     Feeling of Stress :    Social Connections:     Frequency of Communication with Friends and Family:     Frequency of Social Gatherings with Friends and Family:     Attends Tenriism Services:     Active Member of Clubs or Organizations:     Attends Club or Organization Meetings:     Marital Status:    Intimate Partner Violence:     Fear of Current or Ex-Partner:     Emotionally Abused:     Physically Abused:     Sexually Abused:        Current Outpatient Medications:     acetaminophen (TYLENOL) 325 mg tablet, Take 2 tablets (650 mg total) by mouth every 6 (six) hours as needed for headaches (Patient not taking: Reported on 2/12/2021), Disp: 30 tablet, Rfl: 0    Advair Diskus 100-50 MCG/DOSE inhaler, INHALE 1 PUFF 2 (TWO) TIMES A DAY RINSE MOUTH AFTER USE, Disp: 60 each, Rfl: 1    albuterol (PROAIR HFA) 90 mcg/act inhaler, Inhale 2 puffs every 6 (six) hours as needed for wheezing, Disp: 8 5 g, Rfl: 0    hydrocortisone 1 % cream, APPLY 1 APPLICATION TOPICALLY 4 (FOUR) TIMES A DAY AS NEEDED (APPLY TO PERINEUM), Disp: 28 35 g, Rfl: 5  Food Intake and Lifestyle Assessment   Food Intake Assessment completed via usual diet recall (works 4am-2:30pm)  Breakfast: 2 HB eggs, turkey sausage  Snack: 0   Lunch: turkey and cheese, no bread or low carb wrap and fairlife shake  Snack: 0  Dinner: grilled chicken, vegetables, salad  Snack: 0  Beverage intake: water, sugar free beverages and coffee  Protein supplement: 1 protein shake and 1 protein water  Estimated protein intake per day: 70 gm  Estimated fluid intake per day: >90 oz  Meals eaten away from home: once per week now or less  Typical meal pattern: 2-3 meals per day and 2-3 snacks per day  Eating Behaviors: Consumption of high calorie/ high fat foods, Large portion sizes, Frequent snacking/ grazing and Emotional eating  Food allergies or intolerances: No Known Allergies  Cultural or Denominational considerations: N/A    Physical Assessment  Physical Activity  Types of exercise: None-work is physical-loads and offloads bags  Current physical limitations: none    Psychosocial Assessment   Support systems: spouse  Socioeconomic factors: works full time, lives with spouse and 5 children    Nutrition Diagnosis  Diagnosis: Overweight / Obesity (NC-3 3)  Related to: Physical inactivity and Excessive energy intake  As Evidenced by: BMI >25     Nutrition Prescription: Recommend the following diet  Low fat, Low sugar and High protein    Interventions and Teaching   Discussed pre-op and post-op nutrition guidelines  Patient educated and handouts provided  Surgical changes to stomach / GI  Capacity of post-surgery stomach  Diet progression  Adequate hydration  Sugar and fat restriction to decrease "dumping syndrome"  Fat restriction to decrease steatorrhea  Expected weight loss  Weight loss plateaus/ possibility of weight regain  Exercise  Suggestions for pre-op diet  Nutrition considerations after surgery  Protein supplements  Meal planning and preparation  Appropriate carbohydrate, protein, and fat intake, and food/fluid choices to maximize safe weight loss, nutrient intake, and tolerance   Dietary and lifestyle changes  Possible problems with poor eating habits  Intuitive eating  Techniques for self monitoring and keeping daily food journal  Potential for food intolerance after surgery, and ways to deal with them including: lactose intolerance, nausea, reflux, vomiting, diarrhea, food intolerance, appetite changes, gas  Vitamin / Mineral supplementation of Multivitamin with minerals and Vitamin D  Post-operative pregnancy guidelines    Education provided to: patient    Barriers to learning: No barriers identified  Readiness to change: preparation    Prior research on procedure: discussed with provider and friends or family    Comprehension: verbalizes understanding     Expected Compliance: good  Recommendations  Pt is an appropriate candidate for surgery   Yes    Evaluation / Monitoring  Dietitian to Monitor: Eating pattern as discussed Body weight Lab values Physical activity    Goals  Food journal, Exercise 30 minutes 5 times per week and Complete lession plans 1-6    Time Spent:   30 minutes

## 2021-07-14 ENCOUNTER — OFFICE VISIT (OUTPATIENT)
Dept: BARIATRICS | Facility: CLINIC | Age: 31
End: 2021-07-14

## 2021-07-14 VITALS — WEIGHT: 254 LBS | HEIGHT: 62 IN | BODY MASS INDEX: 46.74 KG/M2

## 2021-07-14 DIAGNOSIS — E66.01 OBESITY, CLASS III, BMI 40-49.9 (MORBID OBESITY) (HCC): Primary | ICD-10-CM

## 2021-07-14 PROCEDURE — RECHECK

## 2021-07-14 NOTE — PROGRESS NOTES
Bariatric Nutrition Follow Up Note    Type of surgery    Preop  Surgery Date: TBD  Surgeon: Dr Matt Doe  33 y o   female     Wt with BMI of 25: 135 lbs  Pre-Op Excess Wt: 128 lbs  Height 5' 1 7" (1 567 m), weight 115 kg (254 lb), currently breastfeeding  Body mass index is 46 91 kg/m²  Weight History   Onset of Obesity: Adult  Family history of obesity: No  Wt Loss Attempts: Counseling with  MD  Exercise  Keto   Maximum Wt Lost: 75 lbs    Review of History and Medications   Past Medical History:   Diagnosis Date    Asthma     Female infertility     PCOS related    Obesity, morbid, BMI 40 0-49 9 ([facility]     Polycystic ovary syndrome     Varicella      Past Surgical History:   Procedure Laterality Date     SECTION      x1     Social History     Socioeconomic History    Marital status: /Civil Union     Spouse name: Not on file    Number of children: Not on file    Years of education: Not on file    Highest education level: Not on file   Occupational History    Not on file   Tobacco Use    Smoking status: Never Smoker    Smokeless tobacco: Never Used    Tobacco comment: no passive smoke exposure   Vaping Use    Vaping Use: Never used   Substance and Sexual Activity    Alcohol use: No    Drug use: No    Sexual activity: Yes     Partners: Male     Birth control/protection: None   Other Topics Concern    Not on file   Social History Narrative    Not on file     Social Determinants of Health     Financial Resource Strain:     Difficulty of Paying Living Expenses:    Food Insecurity:     Worried About Running Out of Food in the Last Year:     Ran Out of Food in the Last Year:    Transportation Needs:     Lack of Transportation (Medical):      Lack of Transportation (Non-Medical):    Physical Activity:     Days of Exercise per Week:     Minutes of Exercise per Session:    Stress:     Feeling of Stress :    Social Connections:     Frequency of Communication with Friends and Family:     Frequency of Social Gatherings with Friends and Family:     Attends Advent Services:     Active Member of Clubs or Organizations:     Attends Club or Organization Meetings:     Marital Status:    Intimate Partner Violence:     Fear of Current or Ex-Partner:     Emotionally Abused:     Physically Abused:     Sexually Abused:        Current Outpatient Medications:     acetaminophen (TYLENOL) 325 mg tablet, Take 2 tablets (650 mg total) by mouth every 6 (six) hours as needed for headaches (Patient not taking: Reported on 2/12/2021), Disp: 30 tablet, Rfl: 0    Advair Diskus 100-50 MCG/DOSE inhaler, INHALE 1 PUFF 2 (TWO) TIMES A DAY RINSE MOUTH AFTER USE, Disp: 60 each, Rfl: 1    albuterol (PROAIR HFA) 90 mcg/act inhaler, Inhale 2 puffs every 6 (six) hours as needed for wheezing, Disp: 8 5 g, Rfl: 0    hydrocortisone 1 % cream, APPLY 1 APPLICATION TOPICALLY 4 (FOUR) TIMES A DAY AS NEEDED (APPLY TO PERINEUM), Disp: 28 35 g, Rfl: 5  Food Intake and Lifestyle Assessment   Food Intake Assessment completed via usual diet recall (works 4am-2:30pm)  Breakfast: 2 HB eggs, turkey sausage  Snack: 0   Lunch: turkey and cheese, no bread or low carb wrap and fairlife shake  Snack: 0  Dinner: grilled chicken, vegetables, salad  Snack: 0  Beverage intake: water, sugar free beverages and coffee  Protein supplement: 1 protein shake and 1 protein water  Estimated protein intake per day: 70 gm  Estimated fluid intake per day: >90 oz  Meals eaten away from home: once per week now or less  Typical meal pattern: 2-3 meals per day and 2-3 snacks per day  Eating Behaviors: Consumption of high calorie/ high fat foods, Large portion sizes, Frequent snacking/ grazing and Emotional eating  Food allergies or intolerances: No Known Allergies  Cultural or Roman Catholic considerations: N/A    Physical Assessment  Physical Activity  Types of exercise: None-work is physical-loads and offloads bags  Current physical limitations: none    Psychosocial Assessment   Support systems: spouse  Socioeconomic factors: works full time, lives with spouse and 5 children    Nutrition Diagnosis  Diagnosis: Overweight / Obesity (NC-3 3)  Related to: Physical inactivity and Excessive energy intake  As Evidenced by: BMI >25     Nutrition Prescription: Recommend the following diet  Low fat, Low sugar and High protein    Interventions and Teaching   Discussed pre-op and post-op nutrition guidelines  Patient educated and handouts provided  Surgical changes to stomach / GI  Capacity of post-surgery stomach  Diet progression  Adequate hydration  Sugar and fat restriction to decrease "dumping syndrome"  Fat restriction to decrease steatorrhea  Expected weight loss  Weight loss plateaus/ possibility of weight regain  Exercise  Suggestions for pre-op diet  Nutrition considerations after surgery  Protein supplements  Meal planning and preparation  Appropriate carbohydrate, protein, and fat intake, and food/fluid choices to maximize safe weight loss, nutrient intake, and tolerance   Dietary and lifestyle changes  Possible problems with poor eating habits  Intuitive eating  Techniques for self monitoring and keeping daily food journal  Potential for food intolerance after surgery, and ways to deal with them including: lactose intolerance, nausea, reflux, vomiting, diarrhea, food intolerance, appetite changes, gas  Vitamin / Mineral supplementation of Multivitamin with minerals and Vitamin D  Post-operative pregnancy guidelines    Education provided to: patient    Barriers to learning: No barriers identified  Readiness to change: preparation    Prior research on procedure: discussed with provider and friends or family    Comprehension: verbalizes understanding   Reviewed liver shrinking diet  Expected Compliance: good  Recommendations  Pt is an appropriate candidate for surgery   Yes    Evaluation / Monitoring  Dietitian to Monitor: Eating pattern as discussed Body weight Lab values Physical activity    Goals  Food journal, Exercise 30 minutes 5 times per week and Complete lession plans 1-6    Time Spent:   30 minutes

## 2021-07-22 ENCOUNTER — PREP FOR PROCEDURE (OUTPATIENT)
Dept: BARIATRICS | Facility: CLINIC | Age: 31
End: 2021-07-22

## 2021-07-22 DIAGNOSIS — E66.01 OBESITY, CLASS III, BMI 40-49.9 (MORBID OBESITY) (HCC): Primary | ICD-10-CM

## 2021-07-22 DIAGNOSIS — J45.909 ASTHMA: ICD-10-CM

## 2021-07-22 DIAGNOSIS — E28.2 PCOS (POLYCYSTIC OVARIAN SYNDROME): ICD-10-CM

## 2021-07-28 DIAGNOSIS — O99.519 ASTHMA AFFECTING PREGNANCY, ANTEPARTUM: ICD-10-CM

## 2021-07-28 DIAGNOSIS — J45.909 ASTHMA AFFECTING PREGNANCY, ANTEPARTUM: ICD-10-CM

## 2021-08-10 ENCOUNTER — OFFICE VISIT (OUTPATIENT)
Dept: BARIATRICS | Facility: CLINIC | Age: 31
End: 2021-08-10

## 2021-08-10 VITALS — HEIGHT: 62 IN | BODY MASS INDEX: 46.15 KG/M2 | WEIGHT: 250.8 LBS

## 2021-08-10 DIAGNOSIS — E66.01 OBESITY, CLASS III, BMI 40-49.9 (MORBID OBESITY) (HCC): Primary | ICD-10-CM

## 2021-08-10 PROCEDURE — RECHECK

## 2021-08-10 NOTE — PROGRESS NOTES
Bariatric Nutrition Follow Up Note    Type of surgery    Preop  Surgery Date: TBD  Surgeon: Dr Maddie Lara  33 y o   female     Wt with BMI of 25: 135 lbs  Pre-Op Excess Wt: 128 lbs  Height 5' 1 7" (1 567 m), weight 114 kg (250 lb 12 8 oz), currently breastfeeding  Body mass index is 46 32 kg/m²  Weight History   Onset of Obesity: Adult  Family history of obesity: No  Wt Loss Attempts: Counseling with  MD  Exercise  Keto   Maximum Wt Lost: 75 lbs    Review of History and Medications   Past Medical History:   Diagnosis Date    Asthma     Female infertility     PCOS related    Obesity, morbid, BMI 40 0-49 9 ([address] )     Polycystic ovary syndrome     Varicella      Past Surgical History:   Procedure Laterality Date     SECTION      x1     Social History     Socioeconomic History    Marital status: /Civil Union     Spouse name: Not on file    Number of children: Not on file    Years of education: Not on file    Highest education level: Not on file   Occupational History    Not on file   Tobacco Use    Smoking status: Never Smoker    Smokeless tobacco: Never Used    Tobacco comment: no passive smoke exposure   Vaping Use    Vaping Use: Never used   Substance and Sexual Activity    Alcohol use: No    Drug use: No    Sexual activity: Yes     Partners: Male     Birth control/protection: None   Other Topics Concern    Not on file   Social History Narrative    Not on file     Social Determinants of Health     Financial Resource Strain:     Difficulty of Paying Living Expenses:    Food Insecurity:     Worried About Running Out of Food in the Last Year:     Ran Out of Food in the Last Year:    Transportation Needs:     Lack of Transportation (Medical):      Lack of Transportation (Non-Medical):    Physical Activity:     Days of Exercise per Week:     Minutes of Exercise per Session:    Stress:     Feeling of Stress :    Social Connections:     Frequency of Communication with Friends and Family:     Frequency of Social Gatherings with Friends and Family:     Attends Gnosticism Services:     Active Member of Clubs or Organizations:     Attends Club or Organization Meetings:     Marital Status:    Intimate Partner Violence:     Fear of Current or Ex-Partner:     Emotionally Abused:     Physically Abused:     Sexually Abused:        Current Outpatient Medications:     acetaminophen (TYLENOL) 325 mg tablet, Take 2 tablets (650 mg total) by mouth every 6 (six) hours as needed for headaches (Patient not taking: Reported on 2/12/2021), Disp: 30 tablet, Rfl: 0    Advair Diskus 100-50 MCG/DOSE inhaler, INHALE 1 PUFF 2 (TWO) TIMES A DAY RINSE MOUTH AFTER USE, Disp: 60 blister, Rfl: 1    albuterol (PROAIR HFA) 90 mcg/act inhaler, Inhale 2 puffs every 6 (six) hours as needed for wheezing, Disp: 8 5 g, Rfl: 0    hydrocortisone 1 % cream, APPLY 1 APPLICATION TOPICALLY 4 (FOUR) TIMES A DAY AS NEEDED (APPLY TO PERINEUM), Disp: 28 35 g, Rfl: 5  Food Intake and Lifestyle Assessment   Food Intake Assessment completed via usual diet recall (works 4am-2:30pm)  Breakfast: 2 HB eggs, turkey sausage  Snack: 0   Lunch: turkey and cheese, no bread or low carb wrap and fairlife shake  Snack: 0  Dinner: grilled chicken, vegetables, salad  Snack: 0  Beverage intake: water, sugar free beverages and coffee  Protein supplement: 1 protein shake and 1 protein water  Estimated protein intake per day: 70 gm  Estimated fluid intake per day: >90 oz  Meals eaten away from home: once per week now or less  Typical meal pattern: 2-3 meals per day and 2-3 snacks per day  Eating Behaviors: Consumption of high calorie/ high fat foods, Large portion sizes, Frequent snacking/ grazing and Emotional eating  Food allergies or intolerances: No Known Allergies  Cultural or Presybeterian considerations: N/A    Physical Assessment  Physical Activity  Types of exercise: None-work is physical-loads and offloads bags  Current physical limitations: none    Psychosocial Assessment   Support systems: spouse  Socioeconomic factors: works full time, lives with spouse and 5 children    Nutrition Diagnosis  Diagnosis: Overweight / Obesity (NC-3 3)  Related to: Physical inactivity and Excessive energy intake  As Evidenced by: BMI >25     Nutrition Prescription: Recommend the following diet  Low fat, Low sugar and High protein    Interventions and Teaching   Discussed pre-op and post-op nutrition guidelines  Patient educated and handouts provided  Surgical changes to stomach / GI  Capacity of post-surgery stomach  Diet progression  Adequate hydration  Sugar and fat restriction to decrease "dumping syndrome"  Fat restriction to decrease steatorrhea  Expected weight loss  Weight loss plateaus/ possibility of weight regain  Exercise  Suggestions for pre-op diet  Nutrition considerations after surgery  Protein supplements  Meal planning and preparation  Appropriate carbohydrate, protein, and fat intake, and food/fluid choices to maximize safe weight loss, nutrient intake, and tolerance   Dietary and lifestyle changes  Possible problems with poor eating habits  Intuitive eating  Techniques for self monitoring and keeping daily food journal  Potential for food intolerance after surgery, and ways to deal with them including: lactose intolerance, nausea, reflux, vomiting, diarrhea, food intolerance, appetite changes, gas  Vitamin / Mineral supplementation of Multivitamin with minerals and Vitamin D  Post-operative pregnancy guidelines    Education provided to: patient    Barriers to learning: No barriers identified  Readiness to change: preparation    Prior research on procedure: discussed with provider and friends or family    Comprehension: verbalizes understanding   Reviewed liver shrinking diet  Expected Compliance: good  Recommendations  Pt is an appropriate candidate for surgery   Yes    Evaluation / Monitoring  Dietitian to Monitor: Eating pattern as discussed Body weight Lab values Physical activity    Goals  Food journal, Exercise 30 minutes 5 times per week and Complete lession plans 1-6    Time Spent:   30 minutes

## 2021-08-19 ENCOUNTER — CLINICAL SUPPORT (OUTPATIENT)
Dept: BARIATRICS | Facility: CLINIC | Age: 31
End: 2021-08-19

## 2021-08-19 DIAGNOSIS — E66.01 OBESITY, CLASS III, BMI 40-49.9 (MORBID OBESITY) (HCC): Primary | ICD-10-CM

## 2021-08-19 PROCEDURE — RECHECK: Performed by: DIETITIAN, REGISTERED

## 2021-08-19 NOTE — PROGRESS NOTES
Weight Management Nutrition Class       Bariatric Surgeon: Dr Manda Gonzalez    Surgery: pre op bypass     Class: Pre Op Class     Topics discussed today include:     Pt attended VIRTUAL pre-op education session  Standardized packet of information for bariatric surgery was sent via email and was reviewed with pt  Importance of lifestyle change and development of regular exercise routine stressed  Pt given the opportunity to ask questions  Ensure pre-surgery drink instructions were given  Questions were answered  Pt verbalized understanding of all information provided  Pt appeared prepared for upcoming surgery  Pt  educated on two-week pre operative liver shrinking diet  Pt understands that the diet needs to be followed for 2 weeks prior to surgery  Handout reviewed  Emphasized the need to drink 80 ounces of fluid per day while on the diet  Reviewed pre-op ERAS drink, post-operative clear liquid, full liquid, and pureed diet, post-operative nutrition rules and facts, and post-operative bariatric multivitamin/mineral recommendations and brand comparison  Contact information provided for any questions/concerns  Patient was able to verbalize basic diet (protein, fluid, vitamin and mineral) recommendations and possible nutrition-related complications   Yes

## 2021-08-27 ENCOUNTER — OFFICE VISIT (OUTPATIENT)
Dept: BARIATRICS | Facility: CLINIC | Age: 31
End: 2021-08-27
Payer: COMMERCIAL

## 2021-08-27 VITALS
WEIGHT: 246 LBS | TEMPERATURE: 97.3 F | SYSTOLIC BLOOD PRESSURE: 106 MMHG | BODY MASS INDEX: 45.27 KG/M2 | HEART RATE: 86 BPM | DIASTOLIC BLOOD PRESSURE: 60 MMHG | HEIGHT: 62 IN

## 2021-08-27 DIAGNOSIS — E66.01 MORBID (SEVERE) OBESITY DUE TO EXCESS CALORIES (HCC): Primary | ICD-10-CM

## 2021-08-27 PROCEDURE — 1036F TOBACCO NON-USER: CPT | Performed by: SURGERY

## 2021-08-27 PROCEDURE — 3008F BODY MASS INDEX DOCD: CPT | Performed by: SURGERY

## 2021-08-27 PROCEDURE — 99213 OFFICE O/P EST LOW 20 MIN: CPT | Performed by: SURGERY

## 2021-08-27 RX ORDER — ACETAMINOPHEN 325 MG/1
975 TABLET ORAL ONCE
Status: CANCELLED | OUTPATIENT
Start: 2021-09-07 | End: 2021-08-27

## 2021-08-27 RX ORDER — GABAPENTIN 300 MG/1
600 CAPSULE ORAL ONCE
Status: CANCELLED | OUTPATIENT
Start: 2021-09-07 | End: 2021-08-27

## 2021-08-27 RX ORDER — CELECOXIB 200 MG/1
200 CAPSULE ORAL ONCE
Status: CANCELLED | OUTPATIENT
Start: 2021-09-07 | End: 2021-08-27

## 2021-08-27 RX ORDER — HEPARIN SODIUM 5000 [USP'U]/ML
5000 INJECTION, SOLUTION INTRAVENOUS; SUBCUTANEOUS
Status: CANCELLED | OUTPATIENT
Start: 2021-09-07 | End: 2021-09-08

## 2021-08-27 RX ORDER — SCOLOPAMINE TRANSDERMAL SYSTEM 1 MG/1
1 PATCH, EXTENDED RELEASE TRANSDERMAL ONCE
Status: CANCELLED | OUTPATIENT
Start: 2021-09-07 | End: 2021-08-27

## 2021-08-27 RX ORDER — CEFAZOLIN SODIUM 2 G/50ML
2000 SOLUTION INTRAVENOUS ONCE
Status: CANCELLED | OUTPATIENT
Start: 2021-09-07 | End: 2021-08-27

## 2021-08-27 NOTE — PROGRESS NOTES
BARIATRIC HISTORY AND PHYSICAL - BARIATRIC SURGERY  Otilio Mojica 33 y o  female MRN: [id number]  Unit/Bed#:  Encounter: [id number]      HPI:  Otilio Mojica is a 33 y o  female who presents to review their preoperative workup and see if they are a good candidate to undergo a bariatric procedure  Review of Systems   Constitutional: Negative for chills and fever  HENT: Negative for ear pain and sore throat  Eyes: Negative for pain and visual disturbance  Respiratory: Negative for cough and shortness of breath  Cardiovascular: Negative for chest pain and palpitations  Gastrointestinal: Negative for abdominal pain and vomiting  Genitourinary: Negative for dysuria and hematuria  Musculoskeletal: Negative for arthralgias and back pain  Skin: Negative for color change and rash  Neurological: Negative for seizures and syncope  All other systems reviewed and are negative        Historical Information   Past Medical History:   Diagnosis Date    Asthma     Female infertility     PCOS related    Obesity, morbid, BMI 40 0-49 9 ([facility] [address] )     Polycystic ovary syndrome     Varicella      Past Surgical History:   Procedure Laterality Date     SECTION      x1     Social History   Social History     Substance and Sexual Activity   Alcohol Use No     Social History     Substance and Sexual Activity   Drug Use No     Social History     Tobacco Use   Smoking Status Never Smoker   Smokeless Tobacco Never Used   Tobacco Comment    no passive smoke exposure     Family History: non-contributory    Meds/Allergies   all medications and allergies reviewed  No Known Allergies    Objective     Current Vitals:   Blood Pressure: 106/60 (21)  Pulse: 86 (21)  Temperature: (!) 97 3 °F (36 3 °C) (21)  Height: 5' 1 7" (156 7 cm) (21)  Weight - Scale: 112 kg (246 lb) (21)  bowel movement  [unfilled]    Invasive Devices     None Physical Exam  Constitutional:       Appearance: Normal appearance  She is obese  Cardiovascular:      Rate and Rhythm: Normal rate and regular rhythm  Pulmonary:      Effort: Pulmonary effort is normal       Breath sounds: Normal breath sounds  Abdominal:      General: Abdomen is flat  Palpations: Abdomen is soft  Comments: Well healed prior surgical scar   Neurological:      Mental Status: She is alert and oriented to person, place, and time  Lab Results: I have personally reviewed pertinent lab results  Imaging: I have personally reviewed pertinent reports  EKG, Pathology, and Other Studies: I have personally reviewed pertinent reports  Code Status: [unfilled]  Advance Directive and Living Will:      Power of :    POLST:        Assessment/Plan:    The patient presented to review the preoperative workup and see if bariatric surgery is appropriate and indicated following the extensive preoperative workup and the enrollment in our weight loss program   Preoperative workup was complete  Results were reviewed with the patient including the blood work results and the endoscopy findings and the biopsy results  We also reviewed the cardiology evaluation  The patient was determined to be a good candidate for RYGB   ----------------------------------------------------------------------  Smoking: No  Blood thinner use: No  Home pain medication use and who manages it: No  CPAP use: No (If yes, sleep study obtained and reminded pt to bring CPAP to hospital)  History of blood clots: No  Previous abdominal surgeries:   Cardiac clearance obtained: Yes, by Dr Praveen Richey   EKG performed: Yes, normal sinus rhythm   EGD prior to surgery: Yes, normal  FINDINGS:  · The esophagus, stomach and duodenum appeared normal   · Performed single biopsy in the antrum  Screening Labs obtained (CBC, CMP): Yes, 2021  H   Pylori status: Negative   Medication allergies: None  SLIM consult Post-Op: No  Reminded patient about 2 week pre-op liquid diet: Yes  Consent signed: Yes  Pre-Op ERAS Orders placed: Yes  -----------------------------------------------------------------------  Risks and benefits explained one more time to the patient  Alternatives to surgery and alternative forms of surgery were also explained  Post-surgical commitment and after care programs were explained  We also discussed that the [employer] robot may be available to us to use on the day of the patient procedure and that the procedure may be performed robotically  I discussed in details the advantages and disadvantages of this approach including the potential decrease in postoperative pain because of the remote center technology  I also mentioned the lack of strong evidence for the use of robot in bariatric patients and the potential disadvantage to patients because of the prolonged operative time  Consent was signed  Questions were answered and concerns were addressed  Patient will need to start the 2 week liquid diet prior to surgery  Patient wishes to proceed  As per [address] [place] [address] guidelines, I had a discussion with the patient regarding their CODE STATUS in the perioperative period and the patient is level 1 or FULL CODE STATUS      Opal Cash MD  Bariatric Surgery   8/27/2021  9:15 AM

## 2021-08-27 NOTE — H&P (VIEW-ONLY)
BARIATRIC HISTORY AND PHYSICAL - BARIATRIC SURGERY  Francisco Javier Goddard 33 y o  female MRN: [id number]  Unit/Bed#:  Encounter: [id number]      HPI:  Francisco Javier Goddard is a 33 y o  female who presents to review their preoperative workup and see if they are a good candidate to undergo a bariatric procedure  Review of Systems   Constitutional: Negative for chills and fever  HENT: Negative for ear pain and sore throat  Eyes: Negative for pain and visual disturbance  Respiratory: Negative for cough and shortness of breath  Cardiovascular: Negative for chest pain and palpitations  Gastrointestinal: Negative for abdominal pain and vomiting  Genitourinary: Negative for dysuria and hematuria  Musculoskeletal: Negative for arthralgias and back pain  Skin: Negative for color change and rash  Neurological: Negative for seizures and syncope  All other systems reviewed and are negative        Historical Information   Past Medical History:   Diagnosis Date    Asthma     Female infertility     PCOS related    Obesity, morbid, BMI 40 0-49 9 ([address] )     Polycystic ovary syndrome     Varicella      Past Surgical History:   Procedure Laterality Date     SECTION      x1     Social History   Social History     Substance and Sexual Activity   Alcohol Use No     Social History     Substance and Sexual Activity   Drug Use No     Social History     Tobacco Use   Smoking Status Never Smoker   Smokeless Tobacco Never Used   Tobacco Comment    no passive smoke exposure     Family History: non-contributory    Meds/Allergies   all medications and allergies reviewed  No Known Allergies    Objective     Current Vitals:   Blood Pressure: 106/60 (21)  Pulse: 86 (21)  Temperature: (!) 97 3 °F (36 3 °C) (21)  Height: 5' 1 7" (156 7 cm) (21)  Weight - Scale: 112 kg (246 lb) (21)  bowel movement  [unfilled]    Invasive Devices     None Physical Exam  Constitutional:       Appearance: Normal appearance  She is obese  Cardiovascular:      Rate and Rhythm: Normal rate and regular rhythm  Pulmonary:      Effort: Pulmonary effort is normal       Breath sounds: Normal breath sounds  Abdominal:      General: Abdomen is flat  Palpations: Abdomen is soft  Comments: Well healed prior surgical scar   Neurological:      Mental Status: She is alert and oriented to person, place, and time  Lab Results: I have personally reviewed pertinent lab results  Imaging: I have personally reviewed pertinent reports  EKG, Pathology, and Other Studies: I have personally reviewed pertinent reports  Code Status: [unfilled]  Advance Directive and Living Will:      Power of :    POLST:        Assessment/Plan:    The patient presented to review the preoperative workup and see if bariatric surgery is appropriate and indicated following the extensive preoperative workup and the enrollment in our weight loss program   Preoperative workup was complete  Results were reviewed with the patient including the blood work results and the endoscopy findings and the biopsy results  We also reviewed the cardiology evaluation  The patient was determined to be a good candidate for RYGB   ----------------------------------------------------------------------  Smoking: No  Blood thinner use: No  Home pain medication use and who manages it: No  CPAP use: No (If yes, sleep study obtained and reminded pt to bring CPAP to hospital)  History of blood clots: No  Previous abdominal surgeries:   Cardiac clearance obtained: Yes, by Dr Alexis Milian   EKG performed: Yes, normal sinus rhythm   EGD prior to surgery: Yes, normal  FINDINGS:  · The esophagus, stomach and duodenum appeared normal   · Performed single biopsy in the antrum  Screening Labs obtained (CBC, CMP): Yes, 2021  H   Pylori status: Negative   Medication allergies: None  SLIM consult Post-Op: No  Reminded patient about 2 week pre-op liquid diet: Yes  Consent signed: Yes  Pre-Op ERAS Orders placed: Yes  -----------------------------------------------------------------------  Risks and benefits explained one more time to the patient  Alternatives to surgery and alternative forms of surgery were also explained  Post-surgical commitment and after care programs were explained  We also discussed that the [employer] robot may be available to us to use on the day of the patient procedure and that the procedure may be performed robotically  I discussed in details the advantages and disadvantages of this approach including the potential decrease in postoperative pain because of the remote center technology  I also mentioned the lack of strong evidence for the use of robot in bariatric patients and the potential disadvantage to patients because of the prolonged operative time  Consent was signed  Questions were answered and concerns were addressed  Patient will need to start the 2 week liquid diet prior to surgery  Patient wishes to proceed  As per [address] [place] [address] guidelines, I had a discussion with the patient regarding their CODE STATUS in the perioperative period and the patient is level 1 or FULL CODE STATUS      Casey Black MD  Bariatric Surgery   8/27/2021  9:15 AM

## 2021-08-30 RX ORDER — OXYCODONE HYDROCHLORIDE 5 MG/1
5 TABLET ORAL EVERY 4 HOURS PRN
Qty: 5 TABLET | Refills: 0 | Status: SHIPPED | OUTPATIENT
Start: 2021-09-07 | End: 2021-09-21 | Stop reason: ALTCHOICE

## 2021-08-30 RX ORDER — OMEPRAZOLE 20 MG/1
20 CAPSULE, DELAYED RELEASE ORAL DAILY
Qty: 30 CAPSULE | Refills: 3 | Status: SHIPPED | OUTPATIENT
Start: 2021-08-30

## 2021-08-30 NOTE — PRE-PROCEDURE INSTRUCTIONS
Pre-Surgery Instructions:   Medication Instructions    acetaminophen (TYLENOL) 325 mg tablet Instructed patient per Anesthesia Guidelines   Advair Diskus 100-50 MCG/DOSE inhaler Instructed patient per Anesthesia Guidelines   albuterol (PROAIR HFA) 90 mcg/act inhaler Instructed patient per Anesthesia Guidelines   hydrocortisone 1 % cream Instructed patient per Anesthesia Guidelines     Instructed to use albuterol inhaler if needed am of surgery per anesthesia guidelines

## 2021-09-01 ENCOUNTER — TELEPHONE (OUTPATIENT)
Dept: BARIATRICS | Facility: CLINIC | Age: 31
End: 2021-09-01

## 2021-09-03 ENCOUNTER — ANESTHESIA EVENT (OUTPATIENT)
Dept: PERIOP | Facility: HOSPITAL | Age: 31
DRG: 621 | End: 2021-09-03
Payer: COMMERCIAL

## 2021-09-07 ENCOUNTER — HOSPITAL ENCOUNTER (INPATIENT)
Facility: HOSPITAL | Age: 31
LOS: 1 days | Discharge: HOME | DRG: 621 | End: 2021-09-08
Attending: SURGERY | Admitting: SURGERY
Payer: COMMERCIAL

## 2021-09-07 ENCOUNTER — ANESTHESIA (OUTPATIENT)
Dept: PERIOP | Facility: HOSPITAL | Age: 31
DRG: 621 | End: 2021-09-07
Payer: COMMERCIAL

## 2021-09-07 DIAGNOSIS — E66.01 CLASS 3 SEVERE OBESITY DUE TO EXCESS CALORIES WITHOUT SERIOUS COMORBIDITY WITH BODY MASS INDEX (BMI) OF 45.0 TO 49.9 IN ADULT (HCC): Primary | ICD-10-CM

## 2021-09-07 LAB
EXT PREGNANCY TEST URINE: NEGATIVE
EXT. CONTROL: NORMAL

## 2021-09-07 PROCEDURE — 81025 URINE PREGNANCY TEST: CPT | Performed by: ANESTHESIOLOGY

## 2021-09-07 PROCEDURE — 8E0W4CZ ROBOTIC ASSISTED PROCEDURE OF TRUNK REGION, PERCUTANEOUS ENDOSCOPIC APPROACH: ICD-10-PCS | Performed by: SURGERY

## 2021-09-07 PROCEDURE — 0DJ08ZZ INSPECTION OF UPPER INTESTINAL TRACT, VIA NATURAL OR ARTIFICIAL OPENING ENDOSCOPIC: ICD-10-PCS | Performed by: SURGERY

## 2021-09-07 PROCEDURE — C1781 MESH (IMPLANTABLE): HCPCS | Performed by: SURGERY

## 2021-09-07 PROCEDURE — S2900 ROBOTIC SURGICAL SYSTEM: HCPCS | Performed by: SURGERY

## 2021-09-07 PROCEDURE — 0D164ZA BYPASS STOMACH TO JEJUNUM, PERCUTANEOUS ENDOSCOPIC APPROACH: ICD-10-PCS | Performed by: SURGERY

## 2021-09-07 PROCEDURE — C9290 INJ, BUPIVACAINE LIPOSOME: HCPCS | Performed by: STUDENT IN AN ORGANIZED HEALTH CARE EDUCATION/TRAINING PROGRAM

## 2021-09-07 PROCEDURE — 43644 LAP GASTRIC BYPASS/ROUX-EN-Y: CPT | Performed by: SURGERY

## 2021-09-07 PROCEDURE — 43644 LAP GASTRIC BYPASS/ROUX-EN-Y: CPT | Performed by: STUDENT IN AN ORGANIZED HEALTH CARE EDUCATION/TRAINING PROGRAM

## 2021-09-07 DEVICE — SEAMGUARD STPL REINF INTUITIVE SUREFORM 60 GREEN: Type: IMPLANTABLE DEVICE | Status: FUNCTIONAL

## 2021-09-07 RX ORDER — SCOLOPAMINE TRANSDERMAL SYSTEM 1 MG/1
1 PATCH, EXTENDED RELEASE TRANSDERMAL ONCE
Status: DISCONTINUED | OUTPATIENT
Start: 2021-09-07 | End: 2021-09-08 | Stop reason: HOSPADM

## 2021-09-07 RX ORDER — GLYCOPYRROLATE 0.2 MG/ML
INJECTION INTRAMUSCULAR; INTRAVENOUS AS NEEDED
Status: DISCONTINUED | OUTPATIENT
Start: 2021-09-07 | End: 2021-09-07

## 2021-09-07 RX ORDER — SIMETHICONE 80 MG
80 TABLET,CHEWABLE ORAL 4 TIMES DAILY PRN
Status: DISCONTINUED | OUTPATIENT
Start: 2021-09-07 | End: 2021-09-08 | Stop reason: HOSPADM

## 2021-09-07 RX ORDER — ONDANSETRON 2 MG/ML
4 INJECTION INTRAMUSCULAR; INTRAVENOUS ONCE AS NEEDED
Status: DISCONTINUED | OUTPATIENT
Start: 2021-09-07 | End: 2021-09-07 | Stop reason: HOSPADM

## 2021-09-07 RX ORDER — ACETAMINOPHEN 325 MG/1
975 TABLET ORAL ONCE
Status: COMPLETED | OUTPATIENT
Start: 2021-09-07 | End: 2021-09-07

## 2021-09-07 RX ORDER — FENTANYL CITRATE/PF 50 MCG/ML
50 SYRINGE (ML) INJECTION
Status: DISCONTINUED | OUTPATIENT
Start: 2021-09-07 | End: 2021-09-07 | Stop reason: HOSPADM

## 2021-09-07 RX ORDER — PROPOFOL 10 MG/ML
INJECTION, EMULSION INTRAVENOUS AS NEEDED
Status: DISCONTINUED | OUTPATIENT
Start: 2021-09-07 | End: 2021-09-07

## 2021-09-07 RX ORDER — SODIUM CHLORIDE, SODIUM LACTATE, POTASSIUM CHLORIDE, CALCIUM CHLORIDE 600; 310; 30; 20 MG/100ML; MG/100ML; MG/100ML; MG/100ML
100 INJECTION, SOLUTION INTRAVENOUS CONTINUOUS
Status: DISCONTINUED | OUTPATIENT
Start: 2021-09-07 | End: 2021-09-08 | Stop reason: HOSPADM

## 2021-09-07 RX ORDER — HEPARIN SODIUM 5000 [USP'U]/ML
5000 INJECTION, SOLUTION INTRAVENOUS; SUBCUTANEOUS
Status: COMPLETED | OUTPATIENT
Start: 2021-09-07 | End: 2021-09-07

## 2021-09-07 RX ORDER — ACETAMINOPHEN 160 MG/5ML
975 SUSPENSION, ORAL (FINAL DOSE FORM) ORAL EVERY 8 HOURS
Status: DISCONTINUED | OUTPATIENT
Start: 2021-09-07 | End: 2021-09-08 | Stop reason: HOSPADM

## 2021-09-07 RX ORDER — SODIUM CHLORIDE 9 MG/ML
INJECTION, SOLUTION INTRAVENOUS AS NEEDED
Status: DISCONTINUED | OUTPATIENT
Start: 2021-09-07 | End: 2021-09-07 | Stop reason: HOSPADM

## 2021-09-07 RX ORDER — CELECOXIB 200 MG/1
200 CAPSULE ORAL ONCE
Status: COMPLETED | OUTPATIENT
Start: 2021-09-07 | End: 2021-09-07

## 2021-09-07 RX ORDER — MORPHINE SULFATE 4 MG/ML
4 INJECTION, SOLUTION INTRAMUSCULAR; INTRAVENOUS EVERY 4 HOURS PRN
Status: DISCONTINUED | OUTPATIENT
Start: 2021-09-07 | End: 2021-09-08 | Stop reason: HOSPADM

## 2021-09-07 RX ORDER — FENTANYL CITRATE 50 UG/ML
INJECTION, SOLUTION INTRAMUSCULAR; INTRAVENOUS AS NEEDED
Status: DISCONTINUED | OUTPATIENT
Start: 2021-09-07 | End: 2021-09-07

## 2021-09-07 RX ORDER — ACETAMINOPHEN 325 MG/1
975 TABLET ORAL EVERY 8 HOURS
Status: DISCONTINUED | OUTPATIENT
Start: 2021-09-07 | End: 2021-09-08 | Stop reason: HOSPADM

## 2021-09-07 RX ORDER — CEFAZOLIN SODIUM 2 G/50ML
2000 SOLUTION INTRAVENOUS ONCE
Status: COMPLETED | OUTPATIENT
Start: 2021-09-07 | End: 2021-09-07

## 2021-09-07 RX ORDER — OXYCODONE HCL 5 MG/5 ML
10 SOLUTION, ORAL ORAL EVERY 4 HOURS PRN
Status: DISCONTINUED | OUTPATIENT
Start: 2021-09-07 | End: 2021-09-08 | Stop reason: HOSPADM

## 2021-09-07 RX ORDER — ROCURONIUM BROMIDE 10 MG/ML
INJECTION, SOLUTION INTRAVENOUS AS NEEDED
Status: DISCONTINUED | OUTPATIENT
Start: 2021-09-07 | End: 2021-09-07

## 2021-09-07 RX ORDER — PROMETHAZINE HYDROCHLORIDE 25 MG/ML
25 INJECTION, SOLUTION INTRAMUSCULAR; INTRAVENOUS EVERY 6 HOURS PRN
Status: DISCONTINUED | OUTPATIENT
Start: 2021-09-07 | End: 2021-09-08 | Stop reason: HOSPADM

## 2021-09-07 RX ORDER — BUPIVACAINE HYDROCHLORIDE AND EPINEPHRINE 5; 5 MG/ML; UG/ML
INJECTION, SOLUTION PERINEURAL AS NEEDED
Status: DISCONTINUED | OUTPATIENT
Start: 2021-09-07 | End: 2021-09-07 | Stop reason: HOSPADM

## 2021-09-07 RX ORDER — MIDAZOLAM HYDROCHLORIDE 2 MG/2ML
INJECTION, SOLUTION INTRAMUSCULAR; INTRAVENOUS AS NEEDED
Status: DISCONTINUED | OUTPATIENT
Start: 2021-09-07 | End: 2021-09-07

## 2021-09-07 RX ORDER — DIPHENHYDRAMINE HCL 25 MG
25 TABLET ORAL EVERY 8 HOURS PRN
Status: DISCONTINUED | OUTPATIENT
Start: 2021-09-07 | End: 2021-09-08 | Stop reason: HOSPADM

## 2021-09-07 RX ORDER — LIDOCAINE HYDROCHLORIDE 20 MG/ML
INJECTION, SOLUTION EPIDURAL; INFILTRATION; INTRACAUDAL; PERINEURAL AS NEEDED
Status: DISCONTINUED | OUTPATIENT
Start: 2021-09-07 | End: 2021-09-07

## 2021-09-07 RX ORDER — NEOSTIGMINE METHYLSULFATE 1 MG/ML
INJECTION INTRAVENOUS AS NEEDED
Status: DISCONTINUED | OUTPATIENT
Start: 2021-09-07 | End: 2021-09-07

## 2021-09-07 RX ORDER — DEXAMETHASONE SODIUM PHOSPHATE 4 MG/ML
INJECTION, SOLUTION INTRA-ARTICULAR; INTRALESIONAL; INTRAMUSCULAR; INTRAVENOUS; SOFT TISSUE AS NEEDED
Status: DISCONTINUED | OUTPATIENT
Start: 2021-09-07 | End: 2021-09-07

## 2021-09-07 RX ORDER — GABAPENTIN 300 MG/1
600 CAPSULE ORAL ONCE
Status: COMPLETED | OUTPATIENT
Start: 2021-09-07 | End: 2021-09-07

## 2021-09-07 RX ORDER — SODIUM CHLORIDE 9 MG/ML
125 INJECTION, SOLUTION INTRAVENOUS CONTINUOUS
Status: DISCONTINUED | OUTPATIENT
Start: 2021-09-07 | End: 2021-09-08 | Stop reason: HOSPADM

## 2021-09-07 RX ORDER — HYDROMORPHONE HCL/PF 1 MG/ML
0.5 SYRINGE (ML) INJECTION
Status: DISCONTINUED | OUTPATIENT
Start: 2021-09-07 | End: 2021-09-07 | Stop reason: HOSPADM

## 2021-09-07 RX ORDER — ONDANSETRON 2 MG/ML
INJECTION INTRAMUSCULAR; INTRAVENOUS AS NEEDED
Status: DISCONTINUED | OUTPATIENT
Start: 2021-09-07 | End: 2021-09-07

## 2021-09-07 RX ORDER — OXYCODONE HCL 5 MG/5 ML
5 SOLUTION, ORAL ORAL EVERY 4 HOURS PRN
Status: DISCONTINUED | OUTPATIENT
Start: 2021-09-07 | End: 2021-09-08 | Stop reason: HOSPADM

## 2021-09-07 RX ORDER — ALBUTEROL SULFATE 90 UG/1
2 AEROSOL, METERED RESPIRATORY (INHALATION) EVERY 6 HOURS PRN
Status: DISCONTINUED | OUTPATIENT
Start: 2021-09-07 | End: 2021-09-08 | Stop reason: HOSPADM

## 2021-09-07 RX ORDER — METOCLOPRAMIDE HYDROCHLORIDE 5 MG/ML
10 INJECTION INTRAMUSCULAR; INTRAVENOUS EVERY 6 HOURS PRN
Status: DISCONTINUED | OUTPATIENT
Start: 2021-09-07 | End: 2021-09-08 | Stop reason: HOSPADM

## 2021-09-07 RX ORDER — CEFAZOLIN SODIUM 2 G/50ML
2000 SOLUTION INTRAVENOUS EVERY 8 HOURS
Status: COMPLETED | OUTPATIENT
Start: 2021-09-07 | End: 2021-09-08

## 2021-09-07 RX ORDER — ONDANSETRON 2 MG/ML
4 INJECTION INTRAMUSCULAR; INTRAVENOUS EVERY 6 HOURS PRN
Status: DISCONTINUED | OUTPATIENT
Start: 2021-09-07 | End: 2021-09-08 | Stop reason: HOSPADM

## 2021-09-07 RX ADMIN — FENTANYL CITRATE 50 MCG: 50 INJECTION INTRAMUSCULAR; INTRAVENOUS at 10:04

## 2021-09-07 RX ADMIN — PROPOFOL 200 MG: 10 INJECTION, EMULSION INTRAVENOUS at 07:29

## 2021-09-07 RX ADMIN — ONDANSETRON 4 MG: 2 INJECTION INTRAMUSCULAR; INTRAVENOUS at 16:41

## 2021-09-07 RX ADMIN — NEOSTIGMINE METHYLSULFATE 3 MG: 1 INJECTION INTRAVENOUS at 09:18

## 2021-09-07 RX ADMIN — GLYCOPYRROLATE 0.4 MG: 0.2 INJECTION, SOLUTION INTRAMUSCULAR; INTRAVENOUS at 09:18

## 2021-09-07 RX ADMIN — FAMOTIDINE 20 MG: 10 INJECTION INTRAVENOUS at 11:00

## 2021-09-07 RX ADMIN — SODIUM CHLORIDE: 0.9 INJECTION, SOLUTION INTRAVENOUS at 08:16

## 2021-09-07 RX ADMIN — SODIUM CHLORIDE: 0.9 INJECTION, SOLUTION INTRAVENOUS at 09:28

## 2021-09-07 RX ADMIN — GABAPENTIN 600 MG: 300 CAPSULE ORAL at 05:55

## 2021-09-07 RX ADMIN — CEFAZOLIN SODIUM 2000 MG: 2 SOLUTION INTRAVENOUS at 14:54

## 2021-09-07 RX ADMIN — MIDAZOLAM 2 MG: 1 INJECTION INTRAMUSCULAR; INTRAVENOUS at 07:21

## 2021-09-07 RX ADMIN — CEFAZOLIN SODIUM 2000 MG: 2 SOLUTION INTRAVENOUS at 07:13

## 2021-09-07 RX ADMIN — FAMOTIDINE 20 MG: 10 INJECTION INTRAVENOUS at 22:14

## 2021-09-07 RX ADMIN — ONDANSETRON 4 MG: 2 INJECTION INTRAMUSCULAR; INTRAVENOUS at 09:12

## 2021-09-07 RX ADMIN — SODIUM CHLORIDE 125 ML/HR: 0.9 INJECTION, SOLUTION INTRAVENOUS at 06:06

## 2021-09-07 RX ADMIN — METRONIDAZOLE 500 MG: 500 INJECTION, SOLUTION INTRAVENOUS at 22:24

## 2021-09-07 RX ADMIN — ACETAMINOPHEN 975 MG: 325 TABLET, FILM COATED ORAL at 22:14

## 2021-09-07 RX ADMIN — LIDOCAINE HYDROCHLORIDE 100 MG: 20 INJECTION, SOLUTION EPIDURAL; INFILTRATION; INTRACAUDAL; PERINEURAL at 07:29

## 2021-09-07 RX ADMIN — ACETAMINOPHEN 975 MG: 325 SUSPENSION ORAL at 13:53

## 2021-09-07 RX ADMIN — METRONIDAZOLE 500 MG: 500 INJECTION, SOLUTION INTRAVENOUS at 07:25

## 2021-09-07 RX ADMIN — ROCURONIUM BROMIDE 30 MG: 50 INJECTION, SOLUTION INTRAVENOUS at 08:02

## 2021-09-07 RX ADMIN — HEPARIN SODIUM 5000 UNITS: 5000 INJECTION INTRAVENOUS; SUBCUTANEOUS at 06:06

## 2021-09-07 RX ADMIN — FENTANYL CITRATE 50 MCG: 50 INJECTION INTRAMUSCULAR; INTRAVENOUS at 09:52

## 2021-09-07 RX ADMIN — ACETAMINOPHEN 975 MG: 325 TABLET, FILM COATED ORAL at 05:54

## 2021-09-07 RX ADMIN — SODIUM CHLORIDE, SODIUM LACTATE, POTASSIUM CHLORIDE, AND CALCIUM CHLORIDE 100 ML/HR: .6; .31; .03; .02 INJECTION, SOLUTION INTRAVENOUS at 10:19

## 2021-09-07 RX ADMIN — CELECOXIB 200 MG: 200 CAPSULE ORAL at 05:55

## 2021-09-07 RX ADMIN — FENTANYL CITRATE 100 MCG: 50 INJECTION INTRAMUSCULAR; INTRAVENOUS at 08:45

## 2021-09-07 RX ADMIN — METRONIDAZOLE 500 MG: 500 INJECTION, SOLUTION INTRAVENOUS at 16:41

## 2021-09-07 RX ADMIN — DEXAMETHASONE SODIUM PHOSPHATE 4 MG: 4 INJECTION INTRA-ARTICULAR; INTRALESIONAL; INTRAMUSCULAR; INTRAVENOUS; SOFT TISSUE at 07:29

## 2021-09-07 RX ADMIN — FENTANYL CITRATE 100 MCG: 50 INJECTION INTRAMUSCULAR; INTRAVENOUS at 07:29

## 2021-09-07 RX ADMIN — ROCURONIUM BROMIDE 50 MG: 50 INJECTION, SOLUTION INTRAVENOUS at 07:29

## 2021-09-07 RX ADMIN — SCOPALAMINE 1 PATCH: 1 PATCH, EXTENDED RELEASE TRANSDERMAL at 05:55

## 2021-09-07 RX ADMIN — PROPOFOL 50 MG: 10 INJECTION, EMULSION INTRAVENOUS at 09:25

## 2021-09-07 NOTE — INTERVAL H&P NOTE
H&P reviewed  After examining the patient I find no changes in the patients condition since the H&P had been written      Vitals:    09/07/21 0539   BP: 108/60   Pulse: 89   Resp: 18   Temp: 97 6 °F (36 4 °C)   SpO2: 97%

## 2021-09-07 NOTE — ANESTHESIA PREPROCEDURE EVALUATION
Procedure:  BYPASS GASTRIC MONIQUE-EN-Y LAPAROSCOPIC W ROBOTICS W/ INTRAOP EGD (N/A Abdomen)    Relevant Problems   PULMONARY   (+) Asthma   (+) BMI 45     Physical Exam    Airway    Mallampati score: III         Dental       Cardiovascular  Rhythm: regular, Rate: normal,     Pulmonary  Breath sounds clear to auscultation,     Other Findings        Anesthesia Plan  ASA Score- 3     Anesthesia Type- general with ASA Monitors  Additional Monitors:   Airway Plan:           Plan Factors-Exercise tolerance (METS): >4 METS  Chart reviewed  Existing labs reviewed  Patient is not a current smoker  Patient did not smoke on day of surgery  Induction- intravenous  Postoperative Plan- Plan for postoperative opioid use  Planned trial extubation    Informed Consent- Anesthetic plan and risks discussed with patient

## 2021-09-07 NOTE — OP NOTE
OPERATIVE REPORT  PATIENT NAME: Kobe Gould    :  1990  MRN: [id number]  Pt Location: [facility] OR ROOM 08    SURGERY DATE: 2021    Surgeon(s) and Role:     * Eros Borges MD - Primary     * Courtney Castorena MD - Assisting    Preop Diagnosis:  Obesity, Class III, BMI 40-49 9 (morbid obesity) ([facility] ) [E66 01]Body mass index is 45 82 kg/m²  Asthma [J45 909]  PCOS (polycystic ovarian syndrome) [E28 2]    Post-Op Diagnosis Codes:     * Obesity, Class III, BMI 40-49 9 (morbid obesity) ([facility]) [E66 01]     * Asthma [J45 909]     * PCOS (polycystic ovarian syndrome) [E28 2]    Procedure(s) (LRB):  BYPASS GASTRIC MONIQUE-EN-Y LAPAROSCOPIC W ROBOTICS W/ INTRAOP EGD (N/A)    Specimen(s):  * No specimens in log *    Estimated Blood Loss:   20 ml    Drains:  * No LDAs found *    Anesthesia Type:   General    Operative Indications:  Obesity, Class III, BMI 40-49 9 (morbid obesity) ([facility] ) [E66 01]  Asthma [J45 909]  PCOS (polycystic ovarian syndrome) [E28 2]      Operative Findings:  Normal Findings    Complications:   None    Procedure and Technique:  Robotic Monique-en-Y gastric bypass with intraop endoscopy   I was present for the entire procedure    Patient Disposition:  hemodynamically stable     No qualified resident was available  Assistant was necessary for instrument exchange and counter traction and assistance with stapling and intraop endoscopy    Indication:   Patient suffers from morbid obesity and associated co-morbidities  and failed to achieve any meaningful or sustainable weight loss and was therefore consented to undergo a laparoscopic Monique en Y Gastric Bypass  Risks and benefits were explained to the patient, patient consented for the procedure  The patient was brought to the operating room and placed in a supine position  The patient received a dose of IV antibiotics and a dose of subcutaneous Heparin prior to the procedure  The patient was induced under general endotracheal anesthesia   The abdominal wall was prepped and draped under sterile conditions in the usual fashion  The procedure was started by obtaining access to the abdominal cavity using a Veress needle to the left side of the midline around 6 inches from the xiphoid the abdominal cavity was insufflated with CO2 to a pressure of 15 mmHg  After that, the abdomen was entered with an 8 mm trocar using an Optiview trocar under direct visualization  At that point, an 8 and 12 mm robotic trocars were placed on the right side of the abdominal wall, under direct visualization, and another 8 mm robotic trocar and 12 mm assistant trocar were placed on the left side of the abdominal wall, also under direct visualization  A TAP block was performed using 20 ml of Exparel mixed with saline and 0 5 % Marcaine for a total of 100 ml  The patient was then placed in a reverse Trendelenburg position  A Juan retractor was placed through a small stab incision below the xiphoid and was used to retract the left lobe of the liver in a medial fashion, the robot was then docked and locked in place  An OG tube was placed to decompress the stomach and then removed immediately  The procedure was started by lifting the omentum in a cephalad fashion  The omentum was then split in half using the vessel sealer  The ligament of Treitz was identified and then the small bowel was transected with a 60 mm stapler using a white load  The mesentery of the small bowel was then transected using the vessel sealer,   After that, the distal small bowel was run for 150 cm in a way to obtain a 150 cm long Aruna limb  At that point, two enterotomies were made in the BP limb and the Aruna limb with hot scissors l, and the jejunojejunostomy was fashioned using a 60 mm stapler with a white load  After firing the stapler, the staple line was inspected and there was no evidence of bleeding and the staple line was well formed   The common enterotomy of the jejunojejunostomy was closed in two layers using 2-0 Vicryl running suture for the first layer and  2-0 V LOC in a running fashion for the second layer  The mesenteric defect of the small bowel was approximated using nonabsorbable suture in a running fashion  At that point, we turned our attention to the upper portion of the abdomen  The pars flaccida was opened and a gastric pouch was created with serial firings of the Sureform 60 mm intuitive  stapler with a green cartridge reinforced with Seamguard  After the formation of the gastric pouch, the staple lines of the pouch and the gastric remnant were inspected and appeared to be well formed and also appeared to be hemostatic  A new gastrojejunostomy anastomosis was then fashioned in an antecolic antegastric fashion in 2 layers  Outer layer was a running layer of 2-0 V lock suture and the inner  layer was a running 2-0 Vicryl suture  The Mehnaz Means defect was closed with a simple nonabsorbable 2 0 Ethibond suture in a figure-of-eight  Fashion  Upper endoscopy was then performed and showed no evidence of bubbles or bleeding at the suture line of the anastomosis or the staple line of the gastric pouch  The gastrojejunostomy was covered with an omental flap that was secured in place with an absorbable suture in a simple fashion  The [facility] liver retractor was removed  The 12 mm port was closed  with 1-0 Vicryl in a simple fashion  All the skin edges of the trocar sites were approximated with 4-0 Monocryl in a subcuticular inverted fashion  The patient was extubated and transferred to the PACU in stable condition      SIGNATURE: Leonides Persaud MD  DATE: September 7, 2021  TIME: 9:16 AM

## 2021-09-07 NOTE — PLAN OF CARE
Problem: PAIN - ADULT  Goal: Verbalizes/displays adequate comfort level or baseline comfort level  Description: Interventions:  - Encourage patient to monitor pain and request assistance  - Assess pain using appropriate pain scale  - Administer analgesics based on type and severity of pain and evaluate response  - Implement non-pharmacological measures as appropriate and evaluate response  - Consider cultural and social influences on pain and pain management  - Notify physician/advanced practitioner if interventions unsuccessful or patient reports new pain  Outcome: Progressing     Problem: INFECTION - ADULT  Goal: Absence or prevention of progression during hospitalization  Description: INTERVENTIONS:  - Assess and monitor for signs and symptoms of infection  - Monitor lab/diagnostic results  - Monitor all insertion sites, i e  indwelling lines, tubes, and drains  - Monitor endotracheal if appropriate and nasal secretions for changes in amount and color  - [place] appropriate cooling/warming therapies per order  - Administer medications as ordered  - Instruct and encourage patient and family to use good hand hygiene technique  - Identify and instruct in appropriate isolation precautions for identified infection/condition  Outcome: Progressing  Goal: Absence of fever/infection during neutropenic period  Description: INTERVENTIONS:  - Monitor WBC    Outcome: Progressing     Problem: DISCHARGE PLANNING  Goal: Discharge to home or other facility with appropriate resources  Description: INTERVENTIONS:  - Identify barriers to discharge w/patient and caregiver  - Arrange for needed discharge resources and transportation as appropriate  - Identify discharge learning needs (meds, wound care, etc )  - Arrange for interpretive services to assist at discharge as needed  - Refer to Case Management Department for coordinating discharge planning if the patient needs post-hospital services based on physician/advanced practitioner order or complex needs related to functional status, cognitive ability, or social support system  Outcome: Progressing     Problem: Knowledge Deficit  Goal: Patient/family/caregiver demonstrates understanding of disease process, treatment plan, medications, and discharge instructions  Description: Complete learning assessment and assess knowledge base  Interventions:  - Provide teaching at level of understanding  - Provide teaching via preferred learning methods  Outcome: Progressing     Problem: Nutrition/Hydration-ADULT  Goal: Nutrient/Hydration intake appropriate for improving, restoring or maintaining nutritional needs  Description: Monitor and assess patient's nutrition/hydration status for malnutrition  Collaborate with interdisciplinary team and initiate plan and interventions as ordered  Monitor patient's weight and dietary intake as ordered or per policy  Utilize nutrition screening tool and intervene as necessary  Determine patient's food preferences and provide high-protein, high-caloric foods as appropriate       INTERVENTIONS:  - Monitor oral intake, urinary output, labs, and treatment plans  - Assess nutrition and hydration status and recommend course of action  - Evaluate amount of meals eaten  - Assist patient with eating if necessary   - Allow adequate time for meals  - Recommend/ encourage appropriate diets, oral nutritional supplements, and vitamin/mineral supplements  - Order, calculate, and assess calorie counts as needed  - Recommend, monitor, and adjust tube feedings and TPN/PPN based on assessed needs  - Assess need for intravenous fluids  - Provide specific nutrition/hydration education as appropriate  - Include patient/family/caregiver in decisions related to nutrition  Outcome: Progressing

## 2021-09-08 VITALS
DIASTOLIC BLOOD PRESSURE: 75 MMHG | OXYGEN SATURATION: 97 % | RESPIRATION RATE: 16 BRPM | SYSTOLIC BLOOD PRESSURE: 134 MMHG | WEIGHT: 242.51 LBS | HEIGHT: 61 IN | TEMPERATURE: 97.9 F | HEART RATE: 76 BPM | BODY MASS INDEX: 45.79 KG/M2

## 2021-09-08 LAB
ANION GAP SERPL CALCULATED.3IONS-SCNC: 11 MMOL/L (ref 4–13)
BUN SERPL-MCNC: 4 MG/DL (ref 5–25)
CALCIUM SERPL-MCNC: 8 MG/DL (ref 8.3–10.1)
CHLORIDE SERPL-SCNC: 105 MMOL/L (ref 100–108)
CO2 SERPL-SCNC: 26 MMOL/L (ref 21–32)
CREAT SERPL-MCNC: 0.77 MG/DL (ref 0.6–1.3)
ERYTHROCYTE [DISTWIDTH] IN BLOOD BY AUTOMATED COUNT: 12.7 % (ref 11.6–15.1)
GFR SERPL CREATININE-BSD FRML MDRD: 102 ML/MIN/1.73SQ M
GLUCOSE SERPL-MCNC: 90 MG/DL (ref 65–140)
HCT VFR BLD AUTO: 35.1 % (ref 34.8–46.1)
HGB BLD-MCNC: 11.6 G/DL (ref 11.5–15.4)
MCH RBC QN AUTO: 30.1 PG (ref 26.8–34.3)
MCHC RBC AUTO-ENTMCNC: 33 G/DL (ref 31.4–37.4)
MCV RBC AUTO: 91 FL (ref 82–98)
PLATELET # BLD AUTO: 289 THOUSANDS/UL (ref 149–390)
PMV BLD AUTO: 10.7 FL (ref 8.9–12.7)
POTASSIUM SERPL-SCNC: 3.1 MMOL/L (ref 3.5–5.3)
RBC # BLD AUTO: 3.85 MILLION/UL (ref 3.81–5.12)
SODIUM SERPL-SCNC: 142 MMOL/L (ref 136–145)
WBC # BLD AUTO: 9.3 THOUSAND/UL (ref 4.31–10.16)

## 2021-09-08 PROCEDURE — 80048 BASIC METABOLIC PNL TOTAL CA: CPT | Performed by: PHYSICIAN ASSISTANT

## 2021-09-08 PROCEDURE — 99024 POSTOP FOLLOW-UP VISIT: CPT | Performed by: STUDENT IN AN ORGANIZED HEALTH CARE EDUCATION/TRAINING PROGRAM

## 2021-09-08 PROCEDURE — 85027 COMPLETE CBC AUTOMATED: CPT | Performed by: PHYSICIAN ASSISTANT

## 2021-09-08 PROCEDURE — NC001 PR NO CHARGE: Performed by: SURGERY

## 2021-09-08 RX ORDER — ACETAMINOPHEN 160 MG/5ML
975 SUSPENSION, ORAL (FINAL DOSE FORM) ORAL EVERY 8 HOURS
Qty: 638.4 ML | Refills: 0 | Status: SHIPPED | OUTPATIENT
Start: 2021-09-08 | End: 2021-09-15

## 2021-09-08 RX ORDER — POTASSIUM CHLORIDE 14.9 MG/ML
20 INJECTION INTRAVENOUS ONCE
Status: COMPLETED | OUTPATIENT
Start: 2021-09-08 | End: 2021-09-08

## 2021-09-08 RX ADMIN — CEFAZOLIN SODIUM 2000 MG: 2 SOLUTION INTRAVENOUS at 00:04

## 2021-09-08 RX ADMIN — ACETAMINOPHEN 975 MG: 325 SUSPENSION ORAL at 13:08

## 2021-09-08 RX ADMIN — ONDANSETRON 4 MG: 2 INJECTION INTRAMUSCULAR; INTRAVENOUS at 05:14

## 2021-09-08 RX ADMIN — FAMOTIDINE 20 MG: 10 INJECTION INTRAVENOUS at 08:18

## 2021-09-08 RX ADMIN — POTASSIUM CHLORIDE 20 MEQ: 14.9 INJECTION, SOLUTION INTRAVENOUS at 08:52

## 2021-09-08 RX ADMIN — ACETAMINOPHEN 975 MG: 325 TABLET, FILM COATED ORAL at 05:11

## 2021-09-08 NOTE — DISCHARGE SUMMARY
Discharge Summary - Troy Parks 33 y o  female MRN: [id number]    Unit/Bed#: E5 -01 Encounter: [id number]      Pre-Operative Diagnosis: Pre-Op Diagnosis Codes:     * Obesity, Class III, BMI 40-49 9 (morbid obesity) ([facility] ) [E66 01]     * Asthma [J45 909]     * PCOS (polycystic ovarian syndrome) [E28 2]    Post-Operative Diagnosis: Post-Op Diagnosis Codes:     * Obesity, Class III, BMI 40-49 9 (morbid obesity) [facility] [E66 01]     * Asthma [J45 909]     * PCOS (polycystic ovarian syndrome) [E28 2]    Procedures Performed:  Procedure(s):  BYPASS GASTRIC MONIQUE-EN-Y LAPAROSCOPIC W ROBOTICS W/ INTRAOP EGD    Surgeon: Steve Sequeira MD    See H & P for full details of admission and Operative Note for full details of operations performed  Patient tolerated surgery well without complications  In the morning postoperative Day 1, the patient had mild nausea and abdominal pain  Tolerated a clear liquid diet without vomiting  Able to ambulate and voiding independently  Patient was deemed ready for discharge home  Patient was seen and examined prior to discharge  Provisions for Follow-Up Care:  See After Visit Summary/Discharge Instructions for information related to follow-up care and home orders  Disposition: Home, in stable condition  Planned Readmission: No    Discharge Medications:  See After Visit Summary/Discharge Instructions for reconciled discharge medications provided to patient and family  Post Operative instructions: Reviewed with patient and/or family      Signature:   Usama Block PA-C  Date: 9/8/2021 Time: 9:16 AM

## 2021-09-08 NOTE — UTILIZATION REVIEW
Initial Clinical Review    Elective IP surgical procedure  Age/Sex: 33 y o  female  Surgery Date: 09/07/2021  Procedure: BYPASS GASTRIC MONIQUE-EN-Y LAPAROSCOPIC W ROBOTICS W/ INTRAOP EGD    Anesthesia: General  Operative Findings: Normal findings  Progress Note:   9/08 bariatric surgery: POD1 s/p robotic RYGB  Pain adequately controlled on oral pain medication  Ambulating without assistance, voiding well, and using incentive spirometer  Patient tolerating liquid diet without nausea or vomiting today  Vital signs stable  CBC today is stable  BMP obtained today shows low potassium  Plan: Encourage PO fluids, Recommend ambulation, use of SCDs when not ambulating, and incentive spirometry   Complete antibiotic course  replete potassium secondary to insufficient dietary intake, Plan to D/C patient home today pending anticipated progression    Admission Orders: Date/Time/Statement:   Admission Orders (From admission, onward)     Ordered        09/07/21 0810  Inpatient Admission  Once                   Orders Placed This Encounter   Procedures    Inpatient Admission     Standing Status:   Standing     Number of Occurrences:   1     Order Specific Question:   Level of Care     Answer:   Med Surg [16]     Order Specific Question:   Estimated length of stay     Answer:   Inpatient Only Surgery     Vital Signs:  Date/Time  Temp  Pulse  Resp  BP  MAP (mmHg)  SpO2  Calculated FIO2 (%) - Nasal Cannula  Nasal Cannula O2 Flow Rate (L/min)  O2 Device    09/08/21 08:19:18  97 9 °F (36 6 °C)  71  --  125/80  95  97 %  --  --  --    09/08/21 02:46:04  98 2 °F (36 8 °C)  86  18  109/80  90  96 %  --  --  --    09/07/21 23:26:09  98 4 °F (36 9 °C)  77  18  124/78  93  93 %  --  --  --    09/07/21 19:08:10  98 2 °F (36 8 °C)  72  18  123/72  89  97 %  --  --  --    09/07/21 16:18:01  98 °F (36 7 °C)  72  --  109/69  82  96 %  --  --  --    09/07/21 1500  --  104  --  145/74  98  97 %  --  --  --    09/07/21 14:56:22  --  113Abnormal 16  145/74  98  96 %  --  --  --    09/07/21 1354  --  86  --  108/74  85  97 %  --  --  --    09/07/21 1324  --  77  --  125/83  97  96 %  --  --  --    09/07/21 1300  --  72  --  122/81  95  95 %  --  --  --    09/07/21 1153  --  76  --  110/72  85  93 %  --  --  --    09/07/21 1122  --  77  --  138/81  100  95 %  --  --  --    09/07/21 1115  --  --  --  --  --  --  28  2 L/min  --    09/07/21 10:50:16  97 9 °F (36 6 °C)  75  16  119/83  95  96 %  --  --  --    09/07/21 1017  97 6 °F (36 4 °C)  74  13  111/59  --  97 %  32  3 L/min  Nasal cannula    09/07/21 1010  --  74  16  105/49Abnormal   --  91 %  32  3 L/min  Nasal cannula    09/07/21 0955  --  74  20  116/56  --  95 %  32  3 L/min  Nasal cannula    09/07/21 0940  98 °F (36 7 °C)  78  22  118/56  --  95 %  32  3 L/min  Nasal cannula    09/07/21 0539  97 6 °F (36 4 °C)  89  18  108/60  --  97 %  --  --  None (Room air)          Pertinent Labs/Diagnostic Test Results:       Results from last 7 days   Lab Units 09/08/21  0525   WBC Thousand/uL 9 30   HEMOGLOBIN g/dL 11 6   HEMATOCRIT % 35 1   PLATELETS Thousands/uL 289         Results from last 7 days   Lab Units 09/08/21  0525   SODIUM mmol/L 142   POTASSIUM mmol/L 3 1*   CHLORIDE mmol/L 105   CO2 mmol/L 26   ANION GAP mmol/L 11   BUN mg/dL 4*   CREATININE mg/dL 0 77   EGFR ml/min/1 73sq m 102   CALCIUM mg/dL 8 0*             Results from last 7 days   Lab Units 09/08/21  0525   GLUCOSE RANDOM mg/dL 90     Diet: bariatric clear liquid   Mobility: Up as tolerated  DVT Prophylaxis: SCD    Medications/Pain Control:   Scheduled Medications:  acetaminophen, 975 mg, Oral, Q8H   Or  acetaminophen, 975 mg, Oral, Q8H  famotidine, 20 mg, Intravenous, BID  potassium chloride, 20 mEq, Intravenous, Once  scopolamine, 1 patch, Transdermal, Once      Continuous IV Infusions:  lactated ringers, 100 mL/hr, Intravenous, Continuous  sodium chloride, 125 mL/hr, Intravenous, Continuous      PRN Meds:  albuterol, 2 puff, Inhalation, Q6H PRN  diphenhydrAMINE, 25 mg, Oral, Q8H PRN  metoclopramide, 10 mg, Intravenous, Q6H PRN  morphine injection, 4 mg, Intravenous, Q4H PRN  ondansetron, 4 mg, Intravenous, Q6H PRN 9/07 x 1, 9/08 x 1  oxyCODONE, 10 mg, Oral, Q4H PRN  oxyCODONE, 5 mg, Oral, Q4H PRN  phenol, 2 spray, Mouth/Throat, Q2H PRN  promethazine, 25 mg, Intravenous, Q6H PRN  simethicone, 80 mg, Oral, 4x Daily PRN        Network Utilization Review Department  ATTENTION: Please call with any questions or concerns to [phone] and carefully listen to the prompts so that you are directed to the right person  All voicemails are confidential   Sissy Koroma all requests for admission clinical reviews, approved or denied determinations and any other requests to dedicated fax number below belonging to the campus where the patient is receiving treatment   List of dedicated fax numbers for the Facilities:  [address] DENIALS (Administrative/Medical Necessity) [phone]   [address] (Maternity/NICU/Pediatrics) [phone]   [address] [place] [address] [address] [place] [address] [place] [place] [address] [facility] [phone]

## 2021-09-08 NOTE — DISCHARGE INSTR - AVS FIRST PAGE
your medications from [address] in [facility] [address] or cut your pills and open capsules, mix with liquid to drink    Take Tylenol every 8 hours around the clock, unless instructed otherwise  Take your omeprazole daily  It is important to stay hydrated and follow your discharge diet progression   Mild nausea is ok as long as you can drink fluids, sip very slowly and get up and walk during any periods of nausea  You may shower normally after 48 hours, but do not scrub incision sites, blot gently with clean towel to dry incisions  Take home medications as usual unless instructed otherwise while in hospital  Follow up with Dr Susie Nicole and your PCP within the next week

## 2021-09-08 NOTE — NURSING NOTE
Patient verbalized understanding of DC instructions, pt being picked up by family once IV potassium is completed this afternoon

## 2021-09-08 NOTE — DISCHARGE INSTRUCTIONS
Bariatric/Weight Loss Surgery  Hospital Discharge Instructions  1  ACTIVITY:  a  Progress as feels comfortable - a good rule is:  if you are doing something and it begins to hurt, stop doing the activity  Walk every hour while at home  b  Amira Thorne may walk stairs if you do so slowly  c  You may shower 48 hours after surgery  Do not scrub incision sites  Blot gently with clean towel to dry incisions  (see #4 below)   d  Use your incentive spirometer 10 times per hour while awake for 1 week after surgery  e  Do NOT drive for 48 hours after surgery  No driving 24 hours after taking certain prescription pain medications  Examples of such medication are Percocet, Darvocet, Oxycodone, Tylenol #3, and Tylenol with Codeine  2  DIET  a  Stay on a liquid diet for 7 days after your surgery date, sipping slowly  Refer to your manual for examples of choices  Remember to keep your liquids sugar free or low calorie  You may have protein drinks  Make sure to drink 48 to 64 ounces per day of fluids  b  Amira Thorne may advance to a pureed diet one week after surgery as instructed by your diet progression pamphlet  Once you get approval from your surgeon at your first post operative visit, you may advance to the soft diet and remain on soft diet for 8 weeks unless otherwise instructed  3  MEDICATIONS:  a  The abdominal nerve block will wear off during the first 1-2 days that you are home, and you may become sore (especially over incision site/sites where abdominal wall is sutured)  This may create a pulling sensation, especially while moving around, and will fade over time  Continue to take your Tylenol and your pain medication as instructed  b  Start vitamins and minerals one week after surgery or when you start stage 3/puree diet  c  Anti-acid Medication as per prescription  d  Other medications as indicated on the Physician Patient Discharge Instructions form given to you at the time of discharge    e  Make sure that you are splitting your pill or tablet medications in halves or fourths or even crushing them before you take them  Capsules should be opened and mixed with water or jello  You need to do this for at least 4 weeks after surgery  Eventually you will be able to take your medications the regular way as they were prescribed  Beatriceguillermo Gongora will need to consult with your Family Doctor in regards to all your prescribed medication, particularly those for blood pressure and diabetes  As you lose weight, medical conditions may change, requiring an alteration or elimination of the drug dose  Monitor blood pressure closely and call PCP with any concerns  g  Sleeve Gastrectomy patients ONLY:  Complete full course of lovenox injections!  h  DO NOT TAKE BIRTH CONTROL(BC) MEDICATIONS, INSERT BC VAGINAL RINGS, OR PLACE IUD OR ANY OTHER BC METHODS UNTIL 31 DAYS FROM DAY OF DISCHARGE FROM HOSPITAL  THIS PLACES YOU AT HIGH RISK FOR A POTENTIALLY LIFE THREATENING BLOOD CLOT  Remember to always use barrier methods for birth control and speak to your GYN about using two forms of birth control to start 31 days after surgery  It is very important to avoid pregnancy until at least 18-24 months after surgery  4  INCISION CARE  a  You may shower and get incisions wet 2 days after surgery  No soaking tub baths or swimming for 30 days after surgery  Keep abdominal area and incisions clean  Use soap and water to create a good lather and rinse off  Do not scrub incisions  b  If you have a drain, empty the drain as the nurses instructed  5  FOLLOW-UP APPOINTMENT should be made for one week after discharge  Call surgeons office at [phone] to schedule an appointment      6  CALL YOUR DOCTOR FOR:  pain not controlled by pain medications, a temperature greater than 101 5° F, any increase or change in drainage or redness from any incision, any vomiting or inability to keep liquids down, shortness of breath, shoulder pain, or bleeding

## 2021-09-08 NOTE — PROGRESS NOTES
Progress Note - Bariatric Surgery   Annie Mccall 33 y o  female MRN: [id number]  Unit/Bed#: E5 -01 Encounter: [id number]      Subjective/Objective     Subjective:  Patient with morbid obesity POD1 s/p robotic RYGB   Patient denies fevers, chills, sweats, SOB, CP, calf pain  Pain adequately controlled on oral pain medication  Ambulating without assistance, voiding well, and using incentive spirometer  Patient tolerating liquid diet without nausea or vomiting today  Vital signs stable  CBC today is stable  BMP obtained today shows low potassium  Objective:    /80   Pulse 71   Temp 97 9 °F (36 6 °C)   Resp 18   Ht 5' 1" (1 549 m)   Wt 110 kg (242 lb 8 1 oz)   LMP 08/27/2021   SpO2 97%   Breastfeeding No Comment: spotting   BMI 45 82 kg/m²       Intake/Output Summary (Last 24 hours) at 9/8/2021 0834  Last data filed at 9/8/2021 0501  Gross per 24 hour   Intake 1500 ml   Output 3120 ml   Net -1620 ml       Invasive Devices     Peripheral Intravenous Line            Peripheral IV 09/07/21 Dorsal (posterior); Left Hand 1 day                ROS: 10-point system completed  All negative except see HPI  Physical Exam    General Appearance:    Alert, cooperative, no distress, appears stated age   Head:    Normocephalic, without obvious abnormality, atraumatic   Lungs:     Respirations unlabored   Heart:    Regular rate and rhythm   Abdomen:     Soft, appropriate tenderness, no masses, no organomegaly, non-distended   Extremities:   Extremities normal, atraumatic, no cyanosis or edema   Neurologic:  Incision:  Psych:   Normal strength and sensation    Clean, dry, and intact, no bleeding, no drain    Normal mood and affect       Lab, Imaging and other studies:I have personally reviewed pertinent lab results  VTE Mechanical Prophylaxis: sequential compression device    Assessment/Plan  1)  Patient with morbid Obesity s/p robotic RYGB with stable post op course    Patient afebrile and hemodynamically stable  - Encourage PO fluids   - Recommend ambulation, use of SCDs when not ambulating, and incentive spirometry  - Complete antibiotic course  - replete potassium secondary to insufficient dietary intake  - Plan to D/C patient home today pending anticipated progression    Plan of care was discussed with patient        Howard Ruiz MD  Bariatric Surgery  9/8/2021  8:34 AM

## 2021-09-08 NOTE — PLAN OF CARE
Problem: PAIN - ADULT  Goal: Verbalizes/displays adequate comfort level or baseline comfort level  Description: Interventions:  - Encourage patient to monitor pain and request assistance  - Assess pain using appropriate pain scale  - Administer analgesics based on type and severity of pain and evaluate response  - Implement non-pharmacological measures as appropriate and evaluate response  - Consider cultural and social influences on pain and pain management  - Notify physician/advanced practitioner if interventions unsuccessful or patient reports new pain  Outcome: Adequate for Discharge     Problem: INFECTION - ADULT  Goal: Absence or prevention of progression during hospitalization  Description: INTERVENTIONS:  - Assess and monitor for signs and symptoms of infection  - Monitor lab/diagnostic results  - Monitor all insertion sites, i e  indwelling lines, tubes, and drains  - Monitor endotracheal if appropriate and nasal secretions for changes in amount and color  - [place] appropriate cooling/warming therapies per order  - Administer medications as ordered  - Instruct and encourage patient and family to use good hand hygiene technique  - Identify and instruct in appropriate isolation precautions for identified infection/condition  Outcome: Adequate for Discharge  Goal: Absence of fever/infection during neutropenic period  Description: INTERVENTIONS:  - Monitor WBC    Outcome: Adequate for Discharge     Problem: DISCHARGE PLANNING  Goal: Discharge to home or other facility with appropriate resources  Description: INTERVENTIONS:  - Identify barriers to discharge w/patient and caregiver  - Arrange for needed discharge resources and transportation as appropriate  - Identify discharge learning needs (meds, wound care, etc )  - Arrange for interpretive services to assist at discharge as needed  - Refer to Case Management Department for coordinating discharge planning if the patient needs post-hospital services based on physician/advanced practitioner order or complex needs related to functional status, cognitive ability, or social support system  Outcome: Adequate for Discharge     Problem: Knowledge Deficit  Goal: Patient/family/caregiver demonstrates understanding of disease process, treatment plan, medications, and discharge instructions  Description: Complete learning assessment and assess knowledge base  Interventions:  - Provide teaching at level of understanding  - Provide teaching via preferred learning methods  Outcome: Adequate for Discharge     Problem: Nutrition/Hydration-ADULT  Goal: Nutrient/Hydration intake appropriate for improving, restoring or maintaining nutritional needs  Description: Monitor and assess patient's nutrition/hydration status for malnutrition  Collaborate with interdisciplinary team and initiate plan and interventions as ordered  Monitor patient's weight and dietary intake as ordered or per policy  Utilize nutrition screening tool and intervene as necessary  Determine patient's food preferences and provide high-protein, high-caloric foods as appropriate       INTERVENTIONS:  - Monitor oral intake, urinary output, labs, and treatment plans  - Assess nutrition and hydration status and recommend course of action  - Evaluate amount of meals eaten  - Assist patient with eating if necessary   - Allow adequate time for meals  - Recommend/ encourage appropriate diets, oral nutritional supplements, and vitamin/mineral supplements  - Order, calculate, and assess calorie counts as needed  - Recommend, monitor, and adjust tube feedings and TPN/PPN based on assessed needs  - Assess need for intravenous fluids  - Provide specific nutrition/hydration education as appropriate  - Include patient/family/caregiver in decisions related to nutrition  Outcome: Adequate for Discharge

## 2021-09-09 ENCOUNTER — TELEPHONE (OUTPATIENT)
Dept: MEDSURG UNIT | Facility: HOSPITAL | Age: 31
End: 2021-09-09

## 2021-09-09 NOTE — TELEPHONE ENCOUNTER
Post op follow up phone call completed  Pt is sipping liquids  Using IS as instructed, reinforced importance of using IS to help prevent pneumonia  Ambulating about home without difficulty  Pain controlled with analgesia  Reaffirmed examples of liquid diet over the next week  Pt stated understanding about discharge instructions and medication adjustments  Follow up appt with surgeon scheduled for next week  Instructed to call with any additional questions or concerns

## 2021-09-17 ENCOUNTER — OFFICE VISIT (OUTPATIENT)
Dept: BARIATRICS | Facility: CLINIC | Age: 31
End: 2021-09-17

## 2021-09-17 VITALS
BODY MASS INDEX: 41.77 KG/M2 | DIASTOLIC BLOOD PRESSURE: 72 MMHG | WEIGHT: 227 LBS | SYSTOLIC BLOOD PRESSURE: 100 MMHG | HEART RATE: 111 BPM | HEIGHT: 62 IN | TEMPERATURE: 97 F

## 2021-09-17 DIAGNOSIS — E66.01 CLASS 3 SEVERE OBESITY DUE TO EXCESS CALORIES WITHOUT SERIOUS COMORBIDITY WITH BODY MASS INDEX (BMI) OF 45.0 TO 49.9 IN ADULT (HCC): Primary | ICD-10-CM

## 2021-09-17 DIAGNOSIS — Z98.84 BARIATRIC SURGERY STATUS: Primary | ICD-10-CM

## 2021-09-17 PROCEDURE — RECHECK: Performed by: DIETITIAN, REGISTERED

## 2021-09-17 PROCEDURE — 99024 POSTOP FOLLOW-UP VISIT: CPT | Performed by: SURGERY

## 2021-09-17 PROCEDURE — 3008F BODY MASS INDEX DOCD: CPT | Performed by: PHYSICIAN ASSISTANT

## 2021-09-17 NOTE — PROGRESS NOTES
POST OP UP VISIT - BARIATRIC SURGERY  Lawrence Harding 33 y o  female MRN: [id number]  Unit/Bed#:  Encounter: [id number]      HPI:  Lawrence Harding is a 33 y o  female status post robotic RNYGB performed by Dr Howard Renee on 21 returning to office for first post op visit since surgery  Tolerating a pureed diet  Denies nausea and vomiting  Taking multivitamins and PPI daily  Review of Systems   Constitutional: Negative for chills and fever  HENT: Negative for ear pain and sore throat  Eyes: Negative for pain and visual disturbance  Respiratory: Negative for cough and shortness of breath  Cardiovascular: Negative for chest pain and palpitations  Gastrointestinal: Negative for abdominal pain and vomiting  Genitourinary: Negative for dysuria and hematuria  Musculoskeletal: Negative for arthralgias and back pain  Skin: Negative for color change and rash  Neurological: Negative for seizures and syncope  All other systems reviewed and are negative        Historical Information   Past Medical History:   Diagnosis Date    Asthma     Female infertility     PCOS related    History of COVID-2021- not hospitalized    Obesity, morbid, BMI 40 0-49 9 ([facility] [address] )     Polycystic ovary syndrome     Varicella      Past Surgical History:   Procedure Laterality Date     SECTION      x1    EGD      [place] LAP GASTRIC BYPASS/MONIQUE-EN-Y N/A 2021    Procedure: BYPASS GASTRIC MONIQUE-EN-Y LAPAROSCOPIC W ROBOTICS W/ INTRAOP EGD;  Surgeon: Kenny Deutsch MD;  Location: [facility];  Service: Bariatrics     Social History   Social History     Substance and Sexual Activity   Alcohol Use No     Social History     Substance and Sexual Activity   Drug Use No     Social History     Tobacco Use   Smoking Status Never Smoker   Smokeless Tobacco Never Used   Tobacco Comment    no passive smoke exposure     Family History: non-contributory    Meds/Allergies   all medications and allergies reviewed  No Known Allergies    Objective       Current Vitals:   Blood Pressure: 100/72 (09/17/21 0958)  Pulse: (!) 111 (09/17/21 0958)  Temperature: (!) 97 °F (36 1 °C) (09/17/21 0958)  Temp Source: Tympanic (09/17/21 0958)  Height: 5' 1 7" (156 7 cm) (09/17/21 0958)  Weight - Scale: 103 kg (227 lb) (09/17/21 0958)      Invasive Devices     Peripheral Intravenous Line            Peripheral IV 09/07/21 Dorsal (posterior); Left Hand 10 days                Physical Exam  Constitutional:       Appearance: Normal appearance  She is obese  Cardiovascular:      Rate and Rhythm: Normal rate and regular rhythm  Pulmonary:      Effort: Pulmonary effort is normal    Abdominal:      General: Abdomen is flat  Palpations: Abdomen is soft  Comments: Incisions c/d/i, healing well, no evidence of infection    Neurological:      General: No focal deficit present  Mental Status: She is alert and oriented to person, place, and time  Assessment/PLAN:    33 y o  female status post robotic RNYGB done on 9/7/21 by Dr Steve Saucedo, doing well post op  No major issues and healing well  Increase physical activity slowly as tolerated and instructed  Advance diet as instructed by our dietitians today and as indicated in the binder  Start soft diet on Monday  Continue PPI for 6 months    Follow up in one month as scheduled            Cl Pineda MD  Bariatric Surgery   9/17/2021  10:11 AM

## 2021-09-17 NOTE — PROGRESS NOTES
Weight Management Nutrition Note        Bariatric Surgeon: Dr Buster Pulido    Surgery: Gastric Bypass Laparoscopic    Class: first post op note    Topics discussed today include:     fluid goals post op, protein goals post op, constipation, chew food well, diet progression, protein supplems, vitamin/mineral supplements, calcium supplements, additional vitamin B12, iron supplements and fat soluble vitamins     Patient was able to verbalize basic diet (protein, fluid, vitamin and mineral) recommendations and possible nutrition-related complications   Yes

## 2021-09-19 DIAGNOSIS — J45.909 ASTHMA AFFECTING PREGNANCY, ANTEPARTUM: ICD-10-CM

## 2021-09-19 DIAGNOSIS — O99.519 ASTHMA AFFECTING PREGNANCY, ANTEPARTUM: ICD-10-CM

## 2021-09-21 ENCOUNTER — OFFICE VISIT (OUTPATIENT)
Dept: OBGYN CLINIC | Facility: CLINIC | Age: 31
End: 2021-09-21
Payer: COMMERCIAL

## 2021-09-21 VITALS — WEIGHT: 223.8 LBS | SYSTOLIC BLOOD PRESSURE: 110 MMHG | BODY MASS INDEX: 41.33 KG/M2 | DIASTOLIC BLOOD PRESSURE: 64 MMHG

## 2021-09-21 DIAGNOSIS — N93.9 ABNORMAL UTERINE BLEEDING: ICD-10-CM

## 2021-09-21 PROBLEM — U07.1 COVID-19 VIRUS INFECTION: Status: RESOLVED | Noted: 2021-01-21 | Resolved: 2021-09-21

## 2021-09-21 PROBLEM — Z00.00 HEALTHCARE MAINTENANCE: Status: RESOLVED | Noted: 2021-02-12 | Resolved: 2021-09-21

## 2021-09-21 PROBLEM — Z20.828 EXPOSURE TO SARS-ASSOCIATED CORONAVIRUS: Status: RESOLVED | Noted: 2020-04-03 | Resolved: 2021-09-21

## 2021-09-21 PROCEDURE — 99213 OFFICE O/P EST LOW 20 MIN: CPT | Performed by: PHYSICIAN ASSISTANT

## 2021-09-21 PROCEDURE — 1036F TOBACCO NON-USER: CPT | Performed by: PHYSICIAN ASSISTANT

## 2021-09-21 NOTE — TELEPHONE ENCOUNTER
Pt last seen 2/12/21 refilled for 1 month only  She is due for an office visit  They are not allowing me to refill  Requesting new alternatives   Sent to provider for approval

## 2021-09-21 NOTE — PROGRESS NOTES
Meg Turner  1990    S:  33 y o  female here for a problem visit  She has a history of PCOS and longstanding issues with prolonged amenorrhea, but when she does bleed, she typically gets 3 days of light to moderate flow  From May through July 2021 - irregular bleeding most days  Ranged from light to moderate  She had Aruna-En-Y 9/2021 and received a COVID vaccine last week    She tarted 4 days ago with a heavy period - can saturate a pad in a little over an hour    Not feeling lightheaded, dizzy, no chest pain/SOB    Interested in contraception  In the past did not tolerate combined OCPs well    We discussed progesterone only pills, Depo, Nexplanon, and Mirena IUD     TSH was normal 5/2021    We discussed possible reasons for her irregular bleeding:  Recent COVID vaccine, recent gastric bypass, anesthesia, stress       Past Medical History:   Diagnosis Date    Asthma     Female infertility     PCOS related    History of COVID-19 Jan 2021- not hospitalized    Obesity, morbid, BMI 40 0-49 9 ([address] )     Polycystic ovary syndrome     Varicella      Family History   Problem Relation Age of Onset    Breast cancer Family     Lung cancer Family     Fibroids Mother     Heart disease Mother     HIV Mother     Kidney disease Mother     Asthma Son     Lung cancer Maternal Grandmother     Diabetes Maternal Grandmother     Prostate cancer Maternal Grandfather     Breast cancer Maternal Grandfather     Diabetes Maternal Grandfather     No Known Problems Father      Social History     Socioeconomic History    Marital status: /Civil Union     Spouse name: None    Number of children: None    Years of education: None    Highest education level: None   Occupational History    None   Tobacco Use    Smoking status: Never Smoker    Smokeless tobacco: Never Used    Tobacco comment: no passive smoke exposure   Vaping Use    Vaping Use: Never used   Substance and Sexual Activity    Alcohol use: No    Drug use: No    Sexual activity: Yes     Partners: Male     Birth control/protection: None   Other Topics Concern    None   Social History Narrative    None     Social Determinants of Health     Financial Resource Strain:     Difficulty of Paying Living Expenses:    Food Insecurity:     Worried About Running Out of Food in the Last Year:     [address] in the Last Year:    Transportation Needs:     Lack of Transportation (Medical):  Lack of Transportation (Non-Medical):    Physical Activity:     Days of Exercise per Week:     Minutes of Exercise per Session:    Stress:     Feeling of Stress :    Social Connections:     Frequency of Communication with Friends and Family:     Frequency of Social Gatherings with Friends and Family:     Attends [employer] Services:     Active Member of Clubs or Organizations:     Attends Club or Organization Meetings:     Marital Status:    Intimate Partner Violence:     Fear of Current or Ex-Partner:     Emotionally Abused:     Physically Abused:     Sexually Abused:        Review of Systems   Respiratory: Negative  Cardiovascular: Negative  Gastrointestinal: Negative for constipation and diarrhea  Genitourinary: Negative for difficulty urinating, pelvic pain, vaginal bleeding, vaginal discharge, itching or odor  O:  /64 (BP Location: Right arm, Patient Position: Sitting, Cuff Size: Standard)   Wt 102 kg (223 lb 12 8 oz)   LMP 09/18/2021 (Exact Date)   BMI 41 33 kg/m²   She appears well and is in no distress  Abdomen is soft and nontender    A/P: DUB  Aygestin 5mg po TID x 5 days then BID x 5 days then daily x 5 days  Return for Mirena insertion

## 2021-10-06 ENCOUNTER — CLINICAL SUPPORT (OUTPATIENT)
Dept: BARIATRICS | Facility: CLINIC | Age: 31
End: 2021-10-06

## 2021-10-06 DIAGNOSIS — K91.2 POSTSURGICAL MALABSORPTION: Primary | ICD-10-CM

## 2021-10-06 PROCEDURE — RECHECK

## 2021-10-07 ENCOUNTER — TELEPHONE (OUTPATIENT)
Dept: BARIATRICS | Facility: CLINIC | Age: 31
End: 2021-10-07

## 2021-10-13 ENCOUNTER — OFFICE VISIT (OUTPATIENT)
Dept: BARIATRICS | Facility: CLINIC | Age: 31
End: 2021-10-13

## 2021-10-13 VITALS — BODY MASS INDEX: 38.59 KG/M2 | HEIGHT: 62 IN | WEIGHT: 209.7 LBS

## 2021-10-13 DIAGNOSIS — E66.9 OBESITY, CLASS II, BMI 35-39.9: Primary | ICD-10-CM

## 2021-10-13 PROCEDURE — 3008F BODY MASS INDEX DOCD: CPT | Performed by: PHYSICIAN ASSISTANT

## 2021-10-13 PROCEDURE — RECHECK

## 2021-10-22 ENCOUNTER — TELEPHONE (OUTPATIENT)
Dept: BARIATRICS | Facility: CLINIC | Age: 31
End: 2021-10-22

## 2021-11-02 ENCOUNTER — PROCEDURE VISIT (OUTPATIENT)
Dept: OBGYN CLINIC | Facility: CLINIC | Age: 31
End: 2021-11-02
Payer: COMMERCIAL

## 2021-11-02 VITALS — SYSTOLIC BLOOD PRESSURE: 120 MMHG | DIASTOLIC BLOOD PRESSURE: 62 MMHG | WEIGHT: 203 LBS | BODY MASS INDEX: 37.49 KG/M2

## 2021-11-02 DIAGNOSIS — Z30.430 ENCOUNTER FOR IUD INSERTION: Primary | ICD-10-CM

## 2021-11-02 LAB — SL AMB POCT URINE HCG: NORMAL

## 2021-11-02 PROCEDURE — 81025 URINE PREGNANCY TEST: CPT | Performed by: PHYSICIAN ASSISTANT

## 2021-11-02 PROCEDURE — 58300 INSERT INTRAUTERINE DEVICE: CPT | Performed by: PHYSICIAN ASSISTANT

## 2021-12-20 ENCOUNTER — TELEPHONE (OUTPATIENT)
Dept: FAMILY MEDICINE CLINIC | Facility: CLINIC | Age: 31
End: 2021-12-20

## 2021-12-20 DIAGNOSIS — Z11.52 ENCOUNTER FOR SCREENING FOR COVID-19: Primary | ICD-10-CM

## 2022-03-24 ENCOUNTER — OFFICE VISIT (OUTPATIENT)
Dept: BARIATRICS | Facility: CLINIC | Age: 32
End: 2022-03-24
Payer: COMMERCIAL

## 2022-03-24 VITALS
DIASTOLIC BLOOD PRESSURE: 78 MMHG | SYSTOLIC BLOOD PRESSURE: 105 MMHG | BODY MASS INDEX: 30.91 KG/M2 | HEART RATE: 86 BPM | TEMPERATURE: 97.4 F | HEIGHT: 62 IN | WEIGHT: 168 LBS

## 2022-03-24 DIAGNOSIS — K91.2 POSTSURGICAL MALABSORPTION: ICD-10-CM

## 2022-03-24 DIAGNOSIS — E66.9 OBESITY, CLASS I, BMI 30-34.9: ICD-10-CM

## 2022-03-24 DIAGNOSIS — Z48.815 ENCOUNTER FOR SURGICAL AFTERCARE FOLLOWING SURGERY OF DIGESTIVE SYSTEM: Primary | ICD-10-CM

## 2022-03-24 DIAGNOSIS — Z98.84 BARIATRIC SURGERY STATUS: ICD-10-CM

## 2022-03-24 PROCEDURE — 1036F TOBACCO NON-USER: CPT | Performed by: PHYSICIAN ASSISTANT

## 2022-03-24 PROCEDURE — 3008F BODY MASS INDEX DOCD: CPT | Performed by: PHYSICIAN ASSISTANT

## 2022-03-24 PROCEDURE — 99213 OFFICE O/P EST LOW 20 MIN: CPT | Performed by: PHYSICIAN ASSISTANT

## 2022-03-24 NOTE — PROGRESS NOTES
Assessment/Plan:     Patient ID: Henry Gilmore is a 33 y o  female  Bariatric Surgery Status    -s/p Aruna-En-Y Gastric Bypass with Dr Janett Wallace on 9/7/2021  Presents to the office today for OD 2nd postop  · Continued/Maintain healthy weight loss with good nutrition intakes  · Adequate hydration with at least 64oz  fluid intake  · Follow diet as discussed  · Follow vitamin and mineral recommendations as reviewed with you  · Exercise as tolerated  · Colonoscopy referral made: out of age range    · Follow-up in 3 months  We kindly ask that your arrive 15 minutes before your scheduled appointment time with your provider to allow our staff to room you, get your vital signs and update your chart  · Get lab work done prior to annual visit  Please call the office if you need a script  It is recommended to check with your insurance BEFORE getting labs done to make sure they are covered by your policy  · Call our office if you have any problems with abdominal pain especially associated with fever, chills, nausea, vomiting or any other concerns  · All  Post-bariatric surgery patients should be aware that very small quantities of any alcohol can cause impairment and it is very possible not to feel the effect  The effect can be in the system for several hours  It is also a stomach irritant  · It is advised to AVOID alcohol, Nonsteroidal antiinflammatory drugs (NSAIDS) and nicotine of all forms   Any of these can cause stomach irritation/pain  · Discussed the effects of alcohol on a bariatric patient and the increased impairment risk  · Keep up the good work!      Postsurgical Malabsorption   -At risk for malabsorption of vitamins/minerals secondary to malabsorption and restriction of intake from bariatric surgery  - Currently taking adequate postop bariatric surgery vitamin supplementation but advised switching from patches to oral tabs or chews  -Next set of bariatric labs ordered for approximately 2 weeks  -Patient received education about the importance of adhering to a lifelong supplementation regimen to avoid vitamin/mineral deficiencies      Diagnoses and all orders for this visit:    Encounter for surgical aftercare following surgery of digestive system  -     Zinc; Future  -     Vitamin D 25 hydroxy; Future  -     Vitamin B12; Future  -     Vitamin B1, whole blood; Future  -     Vitamin A; Future  -     PTH, intact; Future  -     CBC and Platelet; Future  -     Comprehensive metabolic panel; Future  -     Ferritin; Future  -     Folate; Future  -     Iron Saturation %; Future    Bariatric surgery status  -     Zinc; Future  -     Vitamin D 25 hydroxy; Future  -     Vitamin B12; Future  -     Vitamin B1, whole blood; Future  -     Vitamin A; Future  -     PTH, intact; Future  -     CBC and Platelet; Future  -     Comprehensive metabolic panel; Future  -     Ferritin; Future  -     Folate; Future  -     Iron Saturation %; Future    Postsurgical malabsorption  -     Zinc; Future  -     Vitamin D 25 hydroxy; Future  -     Vitamin B12; Future  -     Vitamin B1, whole blood; Future  -     Vitamin A; Future  -     PTH, intact; Future  -     CBC and Platelet; Future  -     Comprehensive metabolic panel; Future  -     Ferritin; Future  -     Folate; Future  -     Iron Saturation %; Future    Obesity, Class I, BMI 30-34 9  -     Zinc; Future  -     Vitamin D 25 hydroxy; Future  -     Vitamin B12; Future  -     Vitamin B1, whole blood; Future  -     Vitamin A; Future  -     PTH, intact; Future  -     CBC and Platelet; Future  -     Comprehensive metabolic panel; Future  -     Ferritin; Future  -     Folate; Future  -     Iron Saturation %; Future         Subjective:      Patient ID: Jluis Christianson is a 33 y o  female  -s/p Aruna-En-Y Gastric Bypass with Dr Steven Johnson on 9/7/2021  Presents to the office today for OD 2nd postop        Initial: 263  Current: 168  EWL: (Weight loss is ahead of schedule at this post surgical period )  Junior: current   Current BMI is Body mass index is 31 03 kg/m²  · Tolerating a regular diet-yes  · Eating at least 60 grams of protein per day-yes  · Following 30/60 minute rule with liquids-yes  · Drinking at least 64 ounces of fluid per day-yes  · Drinking carbonated beverages-no  · Sufficient exercise-active at work - works [place] and plans on starting to lift   · Using NSAIDs regularly-no  · Using nicotine-no  · Using [employer]  · Supplements: vit patches - mvi + vit d3 with calcium + biotin + collagen    · EWL is 75%, which places the patient ahead of schedule for expected post surgical weight loss at this time  The following portions of the patient's history were reviewed and updated as appropriate: allergies, current medications, past family history, past medical history, past social history, past surgical history and problem list     Review of Systems   Constitutional: Negative  Respiratory: Negative  Gastrointestinal: Negative  Musculoskeletal: Negative  Skin: Negative  Neurological: Negative  Psychiatric/Behavioral: Negative  Objective:    /78 (BP Location: Left arm, Patient Position: Sitting, Cuff Size: Standard)   Pulse 86   Temp (!) 97 4 °F (36 3 °C) (Tympanic)   Ht 5' 1 7" (1 567 m)   Wt 76 2 kg (168 lb)   BMI 31 03 kg/m²      Physical Exam  Vitals and nursing note reviewed  Constitutional:       Appearance: Normal appearance  She is obese  HENT:      Head: Normocephalic and atraumatic  Eyes:      Extraocular Movements: Extraocular movements intact  Pupils: Pupils are equal, round, and reactive to light  Cardiovascular:      Rate and Rhythm: Normal rate and regular rhythm  Pulmonary:      Effort: Pulmonary effort is normal       Breath sounds: Normal breath sounds  Abdominal:      General: Bowel sounds are normal       Tenderness: There is no abdominal tenderness     Musculoskeletal: General: Normal range of motion  Cervical back: Normal range of motion  Skin:     General: Skin is warm and dry  Neurological:      General: No focal deficit present  Mental Status: She is alert and oriented to person, place, and time     Psychiatric:         Mood and Affect: Mood normal

## 2022-08-19 ENCOUNTER — APPOINTMENT (OUTPATIENT)
Dept: LAB | Facility: CLINIC | Age: 32
End: 2022-08-19
Payer: COMMERCIAL

## 2022-08-19 DIAGNOSIS — K91.2 POSTSURGICAL MALABSORPTION: ICD-10-CM

## 2022-08-19 DIAGNOSIS — E66.9 OBESITY, CLASS I, BMI 30-34.9: ICD-10-CM

## 2022-08-19 DIAGNOSIS — Z98.84 BARIATRIC SURGERY STATUS: ICD-10-CM

## 2022-08-19 DIAGNOSIS — Z48.815 ENCOUNTER FOR SURGICAL AFTERCARE FOLLOWING SURGERY OF DIGESTIVE SYSTEM: ICD-10-CM

## 2022-08-19 LAB
25(OH)D3 SERPL-MCNC: 22.4 NG/ML (ref 30–100)
ALBUMIN SERPL BCP-MCNC: 3.7 G/DL (ref 3.5–5)
ALP SERPL-CCNC: 73 U/L (ref 46–116)
ALT SERPL W P-5'-P-CCNC: 17 U/L (ref 12–78)
ANION GAP SERPL CALCULATED.3IONS-SCNC: 4 MMOL/L (ref 4–13)
AST SERPL W P-5'-P-CCNC: 12 U/L (ref 5–45)
BILIRUB SERPL-MCNC: 0.48 MG/DL (ref 0.2–1)
BUN SERPL-MCNC: 11 MG/DL (ref 5–25)
CALCIUM SERPL-MCNC: 8.7 MG/DL (ref 8.3–10.1)
CHLORIDE SERPL-SCNC: 108 MMOL/L (ref 96–108)
CO2 SERPL-SCNC: 28 MMOL/L (ref 21–32)
CREAT SERPL-MCNC: 0.76 MG/DL (ref 0.6–1.3)
ERYTHROCYTE [DISTWIDTH] IN BLOOD BY AUTOMATED COUNT: 13.3 % (ref 11.6–15.1)
FERRITIN SERPL-MCNC: 5 NG/ML (ref 8–388)
FOLATE SERPL-MCNC: 8.9 NG/ML (ref 3.1–17.5)
GFR SERPL CREATININE-BSD FRML MDRD: 102 ML/MIN/1.73SQ M
GLUCOSE P FAST SERPL-MCNC: 73 MG/DL (ref 65–99)
HCT VFR BLD AUTO: 37.1 % (ref 34.8–46.1)
HGB BLD-MCNC: 11.5 G/DL (ref 11.5–15.4)
IRON SATN MFR SERPL: 22 % (ref 15–50)
IRON SERPL-MCNC: 87 UG/DL (ref 50–170)
MCH RBC QN AUTO: 26.7 PG (ref 26.8–34.3)
MCHC RBC AUTO-ENTMCNC: 31 G/DL (ref 31.4–37.4)
MCV RBC AUTO: 86 FL (ref 82–98)
PLATELET # BLD AUTO: 269 THOUSANDS/UL (ref 149–390)
PMV BLD AUTO: 11.1 FL (ref 8.9–12.7)
POTASSIUM SERPL-SCNC: 3.7 MMOL/L (ref 3.5–5.3)
PROT SERPL-MCNC: 6.7 G/DL (ref 6.4–8.4)
PTH-INTACT SERPL-MCNC: 63.9 PG/ML (ref 18.4–80.1)
RBC # BLD AUTO: 4.31 MILLION/UL (ref 3.81–5.12)
SODIUM SERPL-SCNC: 140 MMOL/L (ref 135–147)
TIBC SERPL-MCNC: 387 UG/DL (ref 250–450)
VIT B12 SERPL-MCNC: 268 PG/ML (ref 100–900)
WBC # BLD AUTO: 4.54 THOUSAND/UL (ref 4.31–10.16)

## 2022-08-19 PROCEDURE — 80053 COMPREHEN METABOLIC PANEL: CPT

## 2022-08-19 PROCEDURE — 84425 ASSAY OF VITAMIN B-1: CPT

## 2022-08-19 PROCEDURE — 83970 ASSAY OF PARATHORMONE: CPT

## 2022-08-19 PROCEDURE — 84590 ASSAY OF VITAMIN A: CPT

## 2022-08-19 PROCEDURE — 82607 VITAMIN B-12: CPT

## 2022-08-19 PROCEDURE — 82746 ASSAY OF FOLIC ACID SERUM: CPT

## 2022-08-19 PROCEDURE — 82306 VITAMIN D 25 HYDROXY: CPT

## 2022-08-19 PROCEDURE — 83550 IRON BINDING TEST: CPT

## 2022-08-19 PROCEDURE — 82728 ASSAY OF FERRITIN: CPT

## 2022-08-19 PROCEDURE — 85027 COMPLETE CBC AUTOMATED: CPT

## 2022-08-19 PROCEDURE — 83540 ASSAY OF IRON: CPT

## 2022-08-19 PROCEDURE — 84630 ASSAY OF ZINC: CPT

## 2022-08-19 PROCEDURE — 36415 COLL VENOUS BLD VENIPUNCTURE: CPT

## 2022-08-22 LAB — ZINC SERPL-MCNC: 75 UG/DL (ref 44–115)

## 2022-08-23 LAB — VIT A SERPL-MCNC: 26.9 UG/DL (ref 18.9–57.3)

## 2022-08-24 LAB — VIT B1 BLD-SCNC: 80.2 NMOL/L (ref 66.5–200)

## 2022-09-08 ENCOUNTER — OFFICE VISIT (OUTPATIENT)
Dept: BARIATRICS | Facility: CLINIC | Age: 32
End: 2022-09-08
Payer: COMMERCIAL

## 2022-09-08 VITALS
DIASTOLIC BLOOD PRESSURE: 66 MMHG | HEIGHT: 62 IN | HEART RATE: 75 BPM | BODY MASS INDEX: 30.36 KG/M2 | WEIGHT: 165 LBS | SYSTOLIC BLOOD PRESSURE: 98 MMHG

## 2022-09-08 DIAGNOSIS — K91.2 POSTSURGICAL MALABSORPTION: ICD-10-CM

## 2022-09-08 DIAGNOSIS — E66.9 OBESITY, CLASS I, BMI 30-34.9: ICD-10-CM

## 2022-09-08 DIAGNOSIS — Z98.84 BARIATRIC SURGERY STATUS: ICD-10-CM

## 2022-09-08 DIAGNOSIS — Z48.815 ENCOUNTER FOR SURGICAL AFTERCARE FOLLOWING SURGERY OF DIGESTIVE SYSTEM: Primary | ICD-10-CM

## 2022-09-08 PROCEDURE — 99213 OFFICE O/P EST LOW 20 MIN: CPT | Performed by: PHYSICIAN ASSISTANT

## 2022-09-08 NOTE — PROGRESS NOTES
Assessment/Plan:     Patient ID: Giselle Parks is a 27 y o  female  Bariatric Surgery Status    -s/p Aruna-En-Y Gastric Bypass with Dr Nhung Miranda on 9/7/2021  Presents to the office today for annual    · Continued/Maintain healthy weight loss with good nutrition intakes  · Adequate hydration with at least 64oz  fluid intake  · Follow diet as discussed  · Follow vitamin and mineral recommendations as reviewed with you  · Exercise as tolerated  · Colonoscopy referral made: n/a    · Follow-up in 6 months  We kindly ask that your arrive 15 minutes before your scheduled appointment time with your provider to allow our staff to room you, get your vital signs and update your chart  · Get lab work done   Please call the office if you need a script  It is recommended to check with your insurance BEFORE getting labs done to make sure they are covered by your policy  · Call our office if you have any problems with abdominal pain especially associated with fever, chills, nausea, vomiting or any other concerns  · All  Post-bariatric surgery patients should be aware that very small quantities of any alcohol can cause impairment and it is very possible not to feel the effect  The effect can be in the system for several hours  It is also a stomach irritant  · It is advised to AVOID alcohol, Nonsteroidal antiinflammatory drugs (NSAIDS) and nicotine of all forms   Any of these can cause stomach irritation/pain  · Discussed the effects of alcohol on a bariatric patient and the increased impairment risk  · Keep up the good work!      Postsurgical Malabsorption   -At risk for malabsorption of vitamins/minerals secondary to malabsorption and restriction of intake from bariatric surgery  -NOT Currently taking adequate postop bariatric surgery vitamin supplementation but advised to add calcium and vit D as well as iron as we discussed   -Last set of bariatric labs completed 8/2022 - refer to jorge luis and reviewed with patient today  - WILL REPEAT HER LABS AT NEXT VISIT IN 6 MONTHS  -Patient received education about the importance of adhering to a lifelong supplementation regimen to avoid vitamin/mineral deficiencies      Diagnoses and all orders for this visit:    Encounter for surgical aftercare following surgery of digestive system    Bariatric surgery status    Postsurgical malabsorption    Obesity, Class I, BMI 30-34 9    Other orders  -     Levonorgestrel (MIRENA) 20 MCG/DAY IUD; 1 each by Intrauterine route once         Subjective:      Patient ID: Kayla Denver is a 27 y o  female  -s/p Aruna-En-Y Gastric Bypass with Dr Irina Connor on 9/7/2021  Presents to the office today for annual      Initial: 263  Current: 165  EWL: (Weight loss is ahead of schedule at this post surgical period )  Junior: current   Current BMI is Body mass index is 30 47 kg/m²  · Tolerating a regular diet-yes  · Eating at least 60 grams of protein per day-yes  · Following 30/60 minute rule with liquids-yes  · Drinking at least 64 ounces of fluid per day-yes  · Drinking carbonated beverages-no  · Sufficient exercise-could improve   · Using NSAIDs regularly-no  · Using nicotine-no  · Using alcohol-no  · Supplements: kalpesh mvi with 45 mg iron     The following portions of the patient's history were reviewed and updated as appropriate: allergies, current medications, past family history, past medical history, past social history, past surgical history and problem list     Review of Systems   Constitutional: Negative  Respiratory: Negative  Cardiovascular: Negative  Gastrointestinal: Negative  Neurological: Negative  Psychiatric/Behavioral: Negative  Objective:    BP 98/66 (BP Location: Left arm, Patient Position: Sitting, Cuff Size: Large)   Pulse 75   Ht 5' 1 7" (1 567 m)   Wt 74 8 kg (165 lb)   BMI 30 47 kg/m²      Physical Exam  Vitals and nursing note reviewed     Constitutional: Appearance: Normal appearance  She is obese  HENT:      Head: Normocephalic and atraumatic  Eyes:      Extraocular Movements: Extraocular movements intact  Pupils: Pupils are equal, round, and reactive to light  Cardiovascular:      Rate and Rhythm: Normal rate  Pulmonary:      Effort: Pulmonary effort is normal    Musculoskeletal:         General: Normal range of motion  Cervical back: Normal range of motion  Skin:     General: Skin is warm and dry  Neurological:      General: No focal deficit present  Mental Status: She is alert and oriented to person, place, and time     Psychiatric:         Mood and Affect: Mood normal

## 2022-09-08 NOTE — PATIENT INSTRUCTIONS
Your vitamin B12 level is low, which can affect your nerve health  Recommend that you take 1000 mcg of vitamin B12 sublingually ( under the tongue) per day  Your iron stores (ferritin) is low  Recommend that you add one high potency iron- (such as Vitron C 65 mg) -this is over-the-counter and  available at [address] or on-line)  Take this once a day for 2 weeks  Iron can be constipating so also recommend a daily stool softener OR miralax daily  If you tolerate the high dose iron for 2 weeks then increase it to twice a day for 2 more weeks  If you continue to tolerate it, then increase it to 3 times per day  (stay on the highest dose you can tolerate -one to three Vitron C per day)         Your vitamin D level is low, which can affect your bone health  Recommend that you take 2000 IU of vitamin D3 per day, which is found over the counter  In addition, you need to take 1200 to 1500 mg of calcium citrate per day, in divided doses of 500 to 600 mg each  Your calcium supplements should also contain vitamin D, so that you vitamin D will total 3000 to 4000 IU per day  Follow-up in 6 months  We kindly ask that your arrive 15 minutes before your scheduled appointment time with your provider to allow our staff to room you, get your vital signs and update your chart  Get lab work done   Please call the office if you need a script  It is recommended to check with your insurance BEFORE getting labs done to make sure they are covered by your policy  Call our office if you have any problems with abdominal pain especially associated with fever, chills, nausea, vomiting or any other concerns  All  Post-bariatric surgery patients should be aware that very small quantities of any alcohol can cause impairment and it is very possible not to feel the effect  The effect can be in the system for several hours  It is also a stomach irritant       It is advised to AVOID alcohol, Nonsteroidal antiinflammatory drugs (NSAIDS) and nicotine of all forms   Any of these can cause stomach irritation/pain  Discussed the effects of alcohol on a bariatric patient and the increased impairment risk  Keep up the good work!

## 2022-10-13 ENCOUNTER — OFFICE VISIT (OUTPATIENT)
Dept: OBGYN CLINIC | Facility: CLINIC | Age: 32
End: 2022-10-13
Payer: COMMERCIAL

## 2022-10-13 VITALS — WEIGHT: 167 LBS | SYSTOLIC BLOOD PRESSURE: 104 MMHG | BODY MASS INDEX: 30.84 KG/M2 | DIASTOLIC BLOOD PRESSURE: 72 MMHG

## 2022-10-13 DIAGNOSIS — Z30.432 ENCOUNTER FOR IUD REMOVAL: Primary | ICD-10-CM

## 2022-10-13 DIAGNOSIS — Z30.09 COUNSELING FOR INITIATION OF BIRTH CONTROL METHOD: ICD-10-CM

## 2022-10-13 PROCEDURE — 58301 REMOVE INTRAUTERINE DEVICE: CPT | Performed by: PHYSICIAN ASSISTANT

## 2022-10-13 PROCEDURE — 99213 OFFICE O/P EST LOW 20 MIN: CPT | Performed by: PHYSICIAN ASSISTANT

## 2022-10-13 NOTE — PROGRESS NOTES
Jimmie Cage  1990      CC: Birth control consultation    S:  27 y o  female here for IUD removal  Desires contraception as does not desire pregnancy at this time  Unhappy with IUD secondary to irregular bleeding  Reports spotting to period like flow lasting up to 4 days, every other week to every few weeks since insertion  Finds annoying  Has hx of PCOS and has very irregular periods to prolonged amenorrhea off contraception  Aware of importance of regular withdrawal bleed  Sexually active with her     Denies migraine with aura, HTN, CV ds, liver disease, personal hx or current dx of breast CA, or prior blood clot  No FH of clot formation  Nonsmoker  We reviewed the risks and benefits of birth control pills, patches, NuvaRing, Depo Provera, Mirena, Kyleena, ParaGard and Nexplanon in detail today  Elodie Boas is interested in starting patch         Current Outpatient Medications:   •  Advair Diskus 100-50 MCG/DOSE inhaler, INHALE 1 PUFF 2 (TWO) TIMES A DAY RINSE MOUTH AFTER USE (Patient not taking: No sig reported), Disp: 60 blister, Rfl: 0  •  hydrocortisone 1 % cream, APPLY 1 APPLICATION TOPICALLY 4 (FOUR) TIMES A DAY AS NEEDED (APPLY TO PERINEUM) (Patient not taking: No sig reported), Disp: 28 35 g, Rfl: 5  •  Levonorgestrel (MIRENA) 20 MCG/DAY IUD, 1 each by Intrauterine route once (Patient not taking: Reported on 10/13/2022), Disp: , Rfl:   •  norethindrone (AYGESTIN) 5 mg tablet, One po TID x 5 days then one po BID x 5 days then one po daily x 5 days (Patient not taking: No sig reported), Disp: 30 tablet, Rfl: 0  •  omeprazole (PriLOSEC) 20 mg delayed release capsule, Take 1 capsule (20 mg total) by mouth daily (Patient not taking: Reported on 10/13/2022), Disp: 30 capsule, Rfl: 3  Social History     Socioeconomic History   • Marital status: /Civil Union     Spouse name: Not on file   • Number of children: Not on file   • Years of education: Not on file   • Highest education level: Not on file   Occupational History   • Not on file   Tobacco Use   • Smoking status: Never Smoker   • Smokeless tobacco: Never Used   • Tobacco comment: no passive smoke exposure   Vaping Use   • Vaping Use: Never used   Substance and Sexual Activity   • Alcohol use: No   • Drug use: No   • Sexual activity: Yes     Partners: Male     Birth control/protection: I U D  Other Topics Concern   • Not on file   Social History Narrative   • Not on file     Social Determinants of Health     Financial Resource Strain: Not on file   Food Insecurity: Not on file   Transportation Needs: Not on file   Physical Activity: Not on file   Stress: Not on file   Social Connections: Not on file   Intimate Partner Violence: Not on file   Housing Stability: Not on file     Family History   Problem Relation Age of Onset   • Breast cancer Family    • Lung cancer Family    • Fibroids Mother    • Heart disease Mother    • HIV Mother    • Kidney disease Mother    • Asthma Son    • Lung cancer Maternal Grandmother    • Diabetes Maternal Grandmother    • Prostate cancer Maternal Grandfather    • Breast cancer Maternal Grandfather    • Diabetes Maternal Grandfather    • No Known Problems Father      Past Medical History:   Diagnosis Date   • Asthma    • Female infertility     PCOS related   • History of COVID-19 Jan 2021- not hospitalized   • Obesity, morbid, BMI 40 0-49 9 ([address] )    • Polycystic ovary syndrome    • Varicella        Review of Systems   Respiratory: Negative  Cardiovascular: Negative  Gastrointestinal: Negative for constipation and diarrhea  Genitourinary: Negative for difficulty urinating, pelvic pain, vaginal bleeding, vaginal discharge, itching or odor  O:   Blood pressure 104/72, weight 75 8 kg (167 lb), last menstrual period 10/04/2022, not currently breastfeeding  Physical Exam   She appears well and is in no distress  Normocephalic, atraumatic  Normal respiratory effort    Abdomen is soft and nontender  External genitals are normal without lesions or rashes  Vagina is without discharge or bleeding  Cervix is without lesions or discharge  IUD strings in place  Device removed w/o complication  No focal neurological deficits  Normal mood, affect, and behavior  A:  Contraception/birth control    P:  Desire to start patch  · Compliance with weekly patch application reviewed  Reviewed management for missed patches, time to efficacy, and use/application sites  · Reviewed possibility for irregular bleeding in first 3 months of pill use  Can call with concerns  · Condoms recommended for STD prevention  · Warning sings of use reviewed  She will report these immediately should they occur  · Potential for drug interaction reviewed  Should notify all HCP of use to avoid dec efficacy     RTO at earliest convenience for annual exam and patch check

## 2022-10-13 NOTE — PATIENT INSTRUCTIONS
Apply the patch today  Never apply to the breast    Use a different site with each application     Change patch once weekly (21 days total), followed by a patch free week (7 days total)    If you forget to apply a patch, apply a patch as soon as you remember and use back up birth control (condoms or abstinence) for one week  Condom use is always recommended to prevent STDs

## 2022-10-13 NOTE — PROGRESS NOTES
Iud removal    Date/Time: 10/13/2022 7:52 AM  Performed by: Dafne Acevedo PA-C  Authorized by: Dafne Acevedo PA-C   Universal Protocol:  Consent: Verbal consent obtained  Risks and benefits: risks, benefits and alternatives were discussed  Consent given by: patient  Time out: Immediately prior to procedure a "time out" was called to verify the correct patient, procedure, equipment, support staff and site/side marked as required  Patient understanding: patient states understanding of the procedure being performed  Patient consent: the patient's understanding of the procedure matches consent given  Procedure consent: procedure consent matches procedure scheduled  Relevant documents: relevant documents present and verified  Required items: required blood products, implants, devices, and special equipment available  Patient identity confirmed: verbally with patient      Procedure:     Removed with no complications: yes      Other reason for removal:  Irregular bleeding

## 2022-12-06 ENCOUNTER — HOSPITAL ENCOUNTER (EMERGENCY)
Facility: HOSPITAL | Age: 32
Discharge: HOME | End: 2022-12-06
Attending: EMERGENCY MEDICINE

## 2022-12-06 VITALS
HEART RATE: 93 BPM | OXYGEN SATURATION: 98 % | RESPIRATION RATE: 16 BRPM | SYSTOLIC BLOOD PRESSURE: 116 MMHG | DIASTOLIC BLOOD PRESSURE: 67 MMHG | TEMPERATURE: 97.7 F

## 2022-12-06 DIAGNOSIS — K29.70 GASTRITIS: Primary | ICD-10-CM

## 2022-12-06 LAB
ALBUMIN SERPL BCP-MCNC: 3.8 G/DL (ref 3.5–5)
ALP SERPL-CCNC: 72 U/L (ref 46–116)
ALT SERPL W P-5'-P-CCNC: 19 U/L (ref 12–78)
ANION GAP SERPL CALCULATED.3IONS-SCNC: 4 MMOL/L (ref 4–13)
AST SERPL W P-5'-P-CCNC: 10 U/L (ref 5–45)
BACTERIA UR QL AUTO: ABNORMAL /HPF
BASOPHILS # BLD AUTO: 0.05 THOUSANDS/ÂΜL (ref 0–0.1)
BASOPHILS NFR BLD AUTO: 1 % (ref 0–1)
BILIRUB SERPL-MCNC: 0.31 MG/DL (ref 0.2–1)
BILIRUB UR QL STRIP: NEGATIVE
BUN SERPL-MCNC: 13 MG/DL (ref 5–25)
CALCIUM SERPL-MCNC: 8.5 MG/DL (ref 8.3–10.1)
CHLORIDE SERPL-SCNC: 110 MMOL/L (ref 96–108)
CLARITY UR: CLEAR
CO2 SERPL-SCNC: 25 MMOL/L (ref 21–32)
COLOR UR: ABNORMAL
CREAT SERPL-MCNC: 0.7 MG/DL (ref 0.6–1.3)
EOSINOPHIL # BLD AUTO: 0.09 THOUSAND/ÂΜL (ref 0–0.61)
EOSINOPHIL NFR BLD AUTO: 2 % (ref 0–6)
ERYTHROCYTE [DISTWIDTH] IN BLOOD BY AUTOMATED COUNT: 12.9 % (ref 11.6–15.1)
EXT PREGNANCY TEST URINE: NEGATIVE
EXT. CONTROL: NORMAL
GFR SERPL CREATININE-BSD FRML MDRD: 113 ML/MIN/1.73SQ M
GLUCOSE SERPL-MCNC: 84 MG/DL (ref 65–140)
GLUCOSE UR STRIP-MCNC: ABNORMAL MG/DL
HCT VFR BLD AUTO: 35.1 % (ref 34.8–46.1)
HGB BLD-MCNC: 10.9 G/DL (ref 11.5–15.4)
HGB UR QL STRIP.AUTO: NEGATIVE
IMM GRANULOCYTES # BLD AUTO: 0.01 THOUSAND/UL (ref 0–0.2)
IMM GRANULOCYTES NFR BLD AUTO: 0 % (ref 0–2)
KETONES UR STRIP-MCNC: NEGATIVE MG/DL
LEUKOCYTE ESTERASE UR QL STRIP: ABNORMAL
LIPASE SERPL-CCNC: 205 U/L (ref 73–393)
LYMPHOCYTES # BLD AUTO: 1.91 THOUSANDS/ÂΜL (ref 0.6–4.47)
LYMPHOCYTES NFR BLD AUTO: 36 % (ref 14–44)
MCH RBC QN AUTO: 25.7 PG (ref 26.8–34.3)
MCHC RBC AUTO-ENTMCNC: 31.1 G/DL (ref 31.4–37.4)
MCV RBC AUTO: 83 FL (ref 82–98)
MONOCYTES # BLD AUTO: 0.46 THOUSAND/ÂΜL (ref 0.17–1.22)
MONOCYTES NFR BLD AUTO: 9 % (ref 4–12)
MUCOUS THREADS UR QL AUTO: ABNORMAL
NEUTROPHILS # BLD AUTO: 2.85 THOUSANDS/ÂΜL (ref 1.85–7.62)
NEUTS SEG NFR BLD AUTO: 52 % (ref 43–75)
NITRITE UR QL STRIP: NEGATIVE
NON-SQ EPI CELLS URNS QL MICRO: ABNORMAL /HPF
NRBC BLD AUTO-RTO: 0 /100 WBCS
PH UR STRIP.AUTO: 6 [PH]
PLATELET # BLD AUTO: 292 THOUSANDS/UL (ref 149–390)
PMV BLD AUTO: 10.6 FL (ref 8.9–12.7)
POTASSIUM SERPL-SCNC: 3.5 MMOL/L (ref 3.5–5.3)
PROT SERPL-MCNC: 7 G/DL (ref 6.4–8.4)
PROT UR STRIP-MCNC: ABNORMAL MG/DL
RBC # BLD AUTO: 4.24 MILLION/UL (ref 3.81–5.12)
RBC #/AREA URNS AUTO: ABNORMAL /HPF
SODIUM SERPL-SCNC: 139 MMOL/L (ref 135–147)
SP GR UR STRIP.AUTO: 1.03 (ref 1–1.03)
UROBILINOGEN UR STRIP-ACNC: 2 MG/DL
WBC # BLD AUTO: 5.37 THOUSAND/UL (ref 4.31–10.16)
WBC #/AREA URNS AUTO: ABNORMAL /HPF

## 2022-12-06 RX ORDER — PANTOPRAZOLE SODIUM 20 MG/1
20 TABLET, DELAYED RELEASE ORAL DAILY
Qty: 20 TABLET | Refills: 0 | Status: SHIPPED | OUTPATIENT
Start: 2022-12-06 | End: 2022-12-09

## 2022-12-06 RX ORDER — MAGNESIUM HYDROXIDE/ALUMINUM HYDROXICE/SIMETHICONE 120; 1200; 1200 MG/30ML; MG/30ML; MG/30ML
30 SUSPENSION ORAL ONCE
Status: COMPLETED | OUTPATIENT
Start: 2022-12-06 | End: 2022-12-06

## 2022-12-06 RX ORDER — LIDOCAINE HYDROCHLORIDE 20 MG/ML
15 SOLUTION OROPHARYNGEAL ONCE
Status: COMPLETED | OUTPATIENT
Start: 2022-12-06 | End: 2022-12-06

## 2022-12-06 RX ADMIN — LIDOCAINE HYDROCHLORIDE 15 ML: 20 SOLUTION ORAL; TOPICAL at 20:53

## 2022-12-06 RX ADMIN — ALUMINUM HYDROXIDE, MAGNESIUM HYDROXIDE, AND SIMETHICONE 30 ML: 200; 200; 20 SUSPENSION ORAL at 20:53

## 2022-12-07 NOTE — ED PROVIDER NOTES
History  Chief Complaint   Patient presents with   • Abdominal Pain     Pt states having abdominal pain going on greater then 24 hrs, states increasing pain, with mucous stools  Denies vomiting, but is nauseas  27year old female with a history of PCOS, hali en y gastric bypass, and asthma who presents with epigastric abdominal pain that began around 1500 yesterday and waxes and wanes in intensity  She states that the pain radiates to her back when it is at it's worst  Her pain is worse when she lays flat and after eating  She has had associated nausea, but denies vomiting  She has had two soft bowel movements today but denies any blood in her stool  She denies fever, chills, chest pain, shortness of breath, dysuria, hematuria, vaginal bleeding, and vaginal discharge  Prior to Admission Medications   Prescriptions Last Dose Informant Patient Reported? Taking? Advair Diskus 100-50 MCG/DOSE inhaler   No No   Sig: INHALE 1 PUFF 2 (TWO) TIMES A DAY RINSE MOUTH AFTER USE   Patient not taking: No sig reported   hydrocortisone 1 % cream   No No   Sig: APPLY 1 APPLICATION TOPICALLY 4 (FOUR) TIMES A DAY AS NEEDED (APPLY TO PERINEUM)   Patient not taking: No sig reported   norelgestromin-ethinyl estradiol (ORTHO EVRA) 150-35 MCG/24HR   No No   Sig: Apply patch weekly for 3 weeks, then remove and leave off for 1 week     omeprazole (PriLOSEC) 20 mg delayed release capsule   No No   Sig: Take 1 capsule (20 mg total) by mouth daily   Patient not taking: Reported on 10/13/2022      Facility-Administered Medications: None       Past Medical History:   Diagnosis Date   • Asthma    • Female infertility     PCOS related   • History of COVID-19     2021- not hospitalized   • Obesity, morbid, BMI 40 0-49 9 ([facility] [address] )    • Polycystic ovary syndrome    • Varicella        Past Surgical History:   Procedure Laterality Date   •  SECTION      x1   • EGD     • [place] LAP GASTRIC BYPASS/HALI-EN-Y N/A 2021    Procedure: BYPASS GASTRIC MONIQUE-EN-Y LAPAROSCOPIC W ROBOTICS W/ INTRAOP EGD;  Surgeon: Maria G Hernandez MD;  Location: [facility];  Service: Bariatrics       Family History   Problem Relation Age of Onset   • Breast cancer Family    • Lung cancer Family    • Fibroids Mother    • Heart disease Mother    • HIV Mother    • Kidney disease Mother    • Asthma Son    • Lung cancer Maternal Grandmother    • Diabetes Maternal Grandmother    • Prostate cancer Maternal Grandfather    • Breast cancer Maternal Grandfather    • Diabetes Maternal Grandfather    • No Known Problems Father      I have reviewed and agree with the history as documented  E-Cigarette/Vaping   • E-Cigarette Use Never User      E-Cigarette/Vaping Substances   • Nicotine No    • THC No    • CBD No    • Flavoring No      Social History     Tobacco Use   • Smoking status: Never   • Smokeless tobacco: Never   • Tobacco comments:     no passive smoke exposure   Vaping Use   • Vaping Use: Never used   Substance Use Topics   • Alcohol use: No   • Drug use: No        Review of Systems   Constitutional: Negative for chills, diaphoresis and fever  HENT: Negative for congestion and sore throat  Eyes: Negative for pain and redness  Respiratory: Negative for cough and shortness of breath  Cardiovascular: Negative for chest pain, palpitations and leg swelling  Gastrointestinal: Positive for abdominal pain and nausea  Negative for blood in stool, diarrhea and vomiting  Genitourinary: Negative for dysuria, flank pain, hematuria, vaginal bleeding, vaginal discharge and vaginal pain  Musculoskeletal: Negative for arthralgias and myalgias  Skin: Negative for color change, pallor and rash  Neurological: Negative for dizziness, syncope, weakness, light-headedness, numbness and headaches  All other systems reviewed and are negative        Physical Exam  ED Triage Vitals [12/06/22 1853]   Temperature Pulse Respirations Blood Pressure SpO2   97 7 °F (36 5 °C) 93 16 116/67 98 %      Temp Source Heart Rate Source Patient Position - Orthostatic VS BP Location FiO2 (%)   Oral Monitor Lying Right arm --      Pain Score       6             Orthostatic Vital Signs  Vitals:    12/06/22 1853   BP: 116/67   Pulse: 93   Patient Position - Orthostatic VS: Lying       Physical Exam  Vitals and nursing note reviewed  Constitutional:       General: She is not in acute distress  Appearance: Normal appearance  She is not ill-appearing, toxic-appearing or diaphoretic  HENT:      Head: Normocephalic and atraumatic  Nose: Nose normal  No congestion or rhinorrhea  Mouth/Throat:      Mouth: Mucous membranes are moist       Pharynx: Oropharynx is clear  Eyes:      General: No scleral icterus  Extraocular Movements: Extraocular movements intact  Conjunctiva/sclera: Conjunctivae normal       Pupils: Pupils are equal, round, and reactive to light  Cardiovascular:      Rate and Rhythm: Normal rate and regular rhythm  Pulses: Normal pulses  Heart sounds: Normal heart sounds  No murmur heard  No friction rub  No gallop  Pulmonary:      Effort: Pulmonary effort is normal       Breath sounds: Normal breath sounds  No wheezing, rhonchi or rales  Abdominal:      General: Abdomen is flat  Palpations: Abdomen is soft  Tenderness: There is abdominal tenderness  There is no right CVA tenderness, left CVA tenderness, guarding or rebound  Negative signs include Brush's sign  Musculoskeletal:         General: No swelling, tenderness, deformity or signs of injury  Normal range of motion  Cervical back: Normal range of motion and neck supple  No rigidity or tenderness  Right lower leg: No edema  Left lower leg: No edema  Lymphadenopathy:      Cervical: No cervical adenopathy  Skin:     General: Skin is warm and dry  Capillary Refill: Capillary refill takes less than 2 seconds  Coloration: Skin is not jaundiced or pale  Findings: No bruising, erythema, lesion or rash  Neurological:      General: No focal deficit present  Mental Status: She is alert and oriented to person, place, and time           ED Medications  Medications   aluminum-magnesium hydroxide-simethicone (MYLANTA) oral suspension 30 mL (30 mL Oral Given 12/6/22 2053)   Lidocaine Viscous HCl (XYLOCAINE) 2 % mucosal solution 15 mL (15 mL Swish & Swallow Given 12/6/22 2053)       Diagnostic Studies  Results Reviewed     Procedure Component Value Units Date/Time    Comprehensive metabolic panel [[id number]]  (Abnormal) Collected: 12/06/22 2053    Lab Status: Final result Specimen: Blood from Arm, Right Updated: 12/06/22 2131     Sodium 139 mmol/L      Potassium 3 5 mmol/L      Chloride 110 mmol/L      CO2 25 mmol/L      ANION GAP 4 mmol/L      BUN 13 mg/dL      Creatinine 0 70 mg/dL      Glucose 84 mg/dL      Calcium 8 5 mg/dL      AST 10 U/L      ALT 19 U/L      Alkaline Phosphatase 72 U/L      Total Protein 7 0 g/dL      Albumin 3 8 g/dL      Total Bilirubin 0 31 mg/dL      eGFR 113 ml/min/1 73sq m     Narrative:      [place] guidelines for Chronic Kidney Disease (CKD):   •  Stage 1 with normal or high GFR (GFR > 90 mL/min/1 73 square meters)  •  Stage 2 Mild CKD (GFR = 60-89 mL/min/1 73 square meters)  •  Stage 3A Moderate CKD (GFR = 45-59 mL/min/1 73 square meters)  •  Stage 3B Moderate CKD (GFR = 30-44 mL/min/1 73 square meters)  •  Stage 4 Severe CKD (GFR = 15-29 mL/min/1 73 square meters)  •  Stage 5 End Stage CKD (GFR <15 mL/min/1 73 square meters)  Note: GFR calculation is accurate only with a steady state creatinine    Lipase [[id number]]  (Normal) Collected: 12/06/22 2053    Lab Status: Final result Specimen: Blood from Arm, Right Updated: 12/06/22 2131     Lipase 205 u/L     Urine Microscopic [[id number]]  (Abnormal) Collected: 12/06/22 2105    Lab Status: Final result Specimen: Urine, Clean Catch Updated: 12/06/22 2120     RBC, UA 1-2 /hpf      WBC, UA 1-2 /hpf      Epithelial Cells Occasional /hpf      Bacteria, UA None Seen /hpf      MUCUS THREADS Innumerable    UA (URINE) with reflex to Scope [[id number]]  (Abnormal) Collected: 12/06/22 2105    Lab Status: Final result Specimen: Urine, Clean Catch Updated: 12/06/22 2112     Color, UA Light Yellow     Clarity, UA Clear     Specific Gravity, UA 1 028     pH, UA 6 0     Leukocytes, UA Trace     Nitrite, UA Negative     Protein, UA Trace mg/dl      Glucose,  (3/20%) mg/dl      Ketones, UA Negative mg/dl      Urobilinogen, UA 2 0 mg/dl      Bilirubin, UA Negative     Occult Blood, UA Negative    POCT pregnancy, urine [[id number]]  (Normal) Resulted: 12/06/22 2109    Lab Status: Final result Updated: 12/06/22 2109     EXT Preg Test, Ur Negative     Control Valid    CBC and differential [[id number]]  (Abnormal) Collected: 12/06/22 2053    Lab Status: Final result Specimen: Blood from Arm, Right Updated: 12/06/22 2103     WBC 5 37 Thousand/uL      RBC 4 24 Million/uL      Hemoglobin 10 9 g/dL      Hematocrit 35 1 %      MCV 83 fL      MCH 25 7 pg      MCHC 31 1 g/dL      RDW 12 9 %      MPV 10 6 fL      Platelets 344 Thousands/uL      nRBC 0 /100 WBCs      Neutrophils Relative 52 %      Immat GRANS % 0 %      Lymphocytes Relative 36 %      Monocytes Relative 9 %      Eosinophils Relative 2 %      Basophils Relative 1 %      Neutrophils Absolute 2 85 Thousands/µL      Immature Grans Absolute 0 01 Thousand/uL      Lymphocytes Absolute 1 91 Thousands/µL      Monocytes Absolute 0 46 Thousand/µL      Eosinophils Absolute 0 09 Thousand/µL      Basophils Absolute 0 05 Thousands/µL                  No orders to display         Procedures  Procedures      ED Course                             SBIRT 22yo+    Flowsheet Row Most Recent Value   SBIRT (25 yo +)    In order to provide better care to our patients, we are screening all of our patients for alcohol and drug use   Would it be okay to ask you these screening questions? No Filed at: 12/06/2022 2048                [facility]  Number of Diagnoses or Management Options  Gastritis  Diagnosis management comments: 27year old female with a history of PCOS, hali en y gastric bypass, and asthma who presents with epigastric abdominal pain that began around 1500 yesterday and waxes and wanes in intensity  The pain is associated with lying down and eating  Vitals are within the normal limits  On exam heart is regular rate and rhythem, lungs are clear to ascultation, abdomen is soft with focal epigastric tenderness but no rebound or guarding  Suspect gastritis  Also considering gall bladder pathology and internal hernia  Will order UA, urine pregnancy, CBC, CMP, lipase, Maalox, and viscous lidocaine  Will defer imaging until after labs and treatment  The patient reports significant improvement in her pain after Maalox and viscous lidocaine  Urine pregnancy is negative  UA, CBC, CMP, and lipase are unremarkable  The patient is prescribed pantoprazole for gastritis and instructed to follow up with her PCP  The patient is given return precautions and discharged  Disposition  Final diagnoses:   Gastritis     Time reflects when diagnosis was documented in both MDM as applicable and the Disposition within this note     Time User Action Codes Description Comment    12/6/2022  9:58 PM Lawyer Mcbride Add [K29 70] Gastritis       ED Disposition     ED Disposition   Discharge    Condition   Stable    Date/Time   Tue Dec 6, 2022  9:58 PM    Comment   Caitlyn Morris discharge to home/self care                 Follow-up Information     Follow up With Specialties Details Why Contact Info Additional [address] Emergency Department Emergency Medicine Go to  If symptoms worsen [address] [id number]  [address] Emergency Department, [address], [place], [place], [address] Paramjit Gonsales MD Family Medicine Schedule an appointment as soon as possible for a visit   [address]  [phone]             Discharge Medication List as of 12/6/2022 10:02 PM      START taking these medications    Details   pantoprazole (PROTONIX) 20 mg tablet Take 1 tablet (20 mg total) by mouth daily, Starting Tue 12/6/2022, Normal         CONTINUE these medications which have NOT CHANGED    Details   Advair Diskus 100-50 MCG/DOSE inhaler INHALE 1 PUFF 2 (TWO) TIMES A DAY RINSE MOUTH AFTER USE, Starting Wed 9/22/2021, Normal      hydrocortisone 1 % cream APPLY 1 APPLICATION TOPICALLY 4 (FOUR) TIMES A DAY AS NEEDED (APPLY TO PERINEUM), Starting Thu 7/23/2020, Normal      norelgestromin-ethinyl estradiol (ORTHO EVRA) 150-35 MCG/24HR Apply patch weekly for 3 weeks, then remove and leave off for 1 week , Normal      omeprazole (PriLOSEC) 20 mg delayed release capsule Take 1 capsule (20 mg total) by mouth daily, Starting Mon 8/30/2021, Normal           No discharge procedures on file  PDMP Review       Value Time User    PDMP Reviewed  Yes 9/8/2021  9:15 AM Delia Roy PA-C           ED Provider  Attending physically available and evaluated Sid Barrios  QUAN managed the patient along with the ED Attending      Electronically Signed by         Tesha Reeder DO  12/08/22 [id number]

## 2022-12-07 NOTE — DISCHARGE INSTRUCTIONS
You were seen in the emergency department for abdominal pain  Your lab work did not show any concerning abnormalities and your symptoms improved after treatment  Your symptoms are most likely due to gastritis or irritation of your stomach  A prescription for pantoprazole was sent to your pharmacy  Follow up with your primary care provider as soon as possible  If you have worsening pain, are unable to eat or drink, or develop a fever please return to the emergency department

## 2022-12-09 ENCOUNTER — OFFICE VISIT (OUTPATIENT)
Dept: BARIATRICS | Facility: CLINIC | Age: 32
End: 2022-12-09

## 2022-12-09 VITALS
WEIGHT: 163 LBS | SYSTOLIC BLOOD PRESSURE: 114 MMHG | TEMPERATURE: 98.8 F | HEART RATE: 78 BPM | BODY MASS INDEX: 30 KG/M2 | DIASTOLIC BLOOD PRESSURE: 68 MMHG | HEIGHT: 62 IN

## 2022-12-09 DIAGNOSIS — Z98.84 BARIATRIC SURGERY STATUS: ICD-10-CM

## 2022-12-09 DIAGNOSIS — R10.13 EPIGASTRIC PAIN: ICD-10-CM

## 2022-12-09 DIAGNOSIS — K29.70 GASTRITIS: ICD-10-CM

## 2022-12-09 DIAGNOSIS — Z48.815 ENCOUNTER FOR SURGICAL AFTERCARE FOLLOWING SURGERY OF DIGESTIVE SYSTEM: Primary | ICD-10-CM

## 2022-12-09 RX ORDER — PANTOPRAZOLE SODIUM 40 MG/1
40 TABLET, DELAYED RELEASE ORAL DAILY
Qty: 30 TABLET | Refills: 2 | Status: SHIPPED | OUTPATIENT
Start: 2022-12-09

## 2022-12-09 NOTE — PROGRESS NOTES
Assessment/Plan:    No problem-specific Assessment & Plan notes found for this encounter  Diagnoses and all orders for this visit:    Encounter for surgical aftercare following surgery of digestive system    Gastritis  -     pantoprazole (PROTONIX) 40 mg tablet; Take 1 tablet (40 mg total) by mouth daily    Bariatric surgery status    Epigastric pain        - increase protonix to 40 mg PO QD for now  Unlikely due to ulcerations due to no significant factors - will hold off carafate or further imaging for now  - F/U in 6 weeks  If pain still persist, will add carafate and plan to schedule for EGD    - Come in sooner if symptoms worsen  ER precautions provided to patient  Subjective:      Patient ID: Sid Barrios is a 27 y o  female  S/p RNYGB with Dr Finn Garcia on 09/07/2021 here for ER f/u visit on 12/06/2022 - she presents with epigastric pain that radiated to her back that initially started on Monday 12/05/2022 - progressively gotten worse  At that time, it was felt to be a gas pain that was constant  ER provided with protonix 20 mg PO QD  She has been taking this consistently  Pain has improved overall, still feels like a burning sensation; worsens with food intake  Tolerating a regular diet  Denies associated vomiting, diarrhea, constipation, blood in stool  Denies smoking, ETOH, NSAIDs use, caffiene, eating red sauces  The following portions of the patient's history were reviewed and updated as appropriate: allergies, current medications, past family history, past medical history, past social history, past surgical history and problem list     Review of Systems   Constitutional: Negative  Respiratory: Negative  Cardiovascular: Negative  Gastrointestinal: Positive for abdominal pain (epigastric pain)  Musculoskeletal: Negative  Neurological: Negative  Psychiatric/Behavioral: Negative            Objective:      /68   Pulse 78   Temp 98 8 °F (37 1 °C) (Tympanic)   Ht 5' 1 7" (1 567 m)   Wt 73 9 kg (163 lb)   BMI 30 10 kg/m²          Physical Exam  Vitals and nursing note reviewed  Constitutional:       Appearance: Normal appearance  Cardiovascular:      Rate and Rhythm: Normal rate and regular rhythm  Pulses: Normal pulses  Heart sounds: Normal heart sounds  Pulmonary:      Effort: Pulmonary effort is normal       Breath sounds: Normal breath sounds  Abdominal:      General: Bowel sounds are normal       Palpations: Abdomen is soft  Tenderness: There is abdominal tenderness (with deep palpation; soft abdomen)  Musculoskeletal:         General: Normal range of motion  Skin:     General: Skin is warm and dry  Neurological:      General: No focal deficit present  Mental Status: She is alert and oriented to person, place, and time  Psychiatric:         Mood and Affect: Mood normal          Behavior: Behavior normal          Thought Content:  Thought content normal          Judgment: Judgment normal

## 2022-12-19 ENCOUNTER — TELEPHONE (OUTPATIENT)
Dept: OBGYN CLINIC | Facility: CLINIC | Age: 32
End: 2022-12-19

## 2022-12-19 ENCOUNTER — LAB (OUTPATIENT)
Dept: LAB | Age: 32
End: 2022-12-19

## 2022-12-19 DIAGNOSIS — Z32.01 PREGNANCY TEST POSITIVE: ICD-10-CM

## 2022-12-19 DIAGNOSIS — Z32.01 PREGNANCY TEST POSITIVE: Primary | ICD-10-CM

## 2022-12-19 LAB — B-HCG SERPL-ACNC: 5 MIU/ML

## 2022-12-19 NOTE — TELEPHONE ENCOUNTER
Pt had a positive preg test  A couple times during the last 2 wks  -her lmp was 10/28- she is having a light flow of bleeding  NO cramping or anything  Please  advise

## 2022-12-19 NOTE — TELEPHONE ENCOUNTER
lmp 10/28  Has had a few pos hcgs  Began spotting on Sat - light bleeding off and on since  She is about 7 weeks  Do you want me to try and get an u/s appt? If I can't, can I get an u/s at [facility] facility  Any labs?   Thanks

## 2022-12-19 NOTE — TELEPHONE ENCOUNTER
Yes can try for US first but if not able to get or no IUP seen will need hcg now and 48 hours   O pos on  file

## 2022-12-19 NOTE — TELEPHONE ENCOUNTER
----- Message from [id number] [facility]  Tracy Dockery sent at 12/19/2022  4:58 PM EST -----  Regarding: msg from Dr Shay Boyd: Joshua Come, yes that would be great!

## 2022-12-20 ENCOUNTER — TELEPHONE (OUTPATIENT)
Dept: OBGYN CLINIC | Facility: CLINIC | Age: 32
End: 2022-12-20

## 2022-12-20 NOTE — TELEPHONE ENCOUNTER
----- Message from Nick Garduno MD sent at 12/20/2022 12:56 PM EST -----  Please let patient know pregnancy hormone is negative- non pregnant

## 2023-02-17 ENCOUNTER — HOSPITAL ENCOUNTER (OUTPATIENT)
Dept: RADIOLOGY | Facility: HOSPITAL | Age: 33
Discharge: HOME | End: 2023-02-17

## 2023-02-17 DIAGNOSIS — Z01.818 PREOP EXAMINATION: ICD-10-CM

## 2023-02-17 DIAGNOSIS — K42.9 UMBILICAL HERNIA WITHOUT OBSTRUCTION OR GANGRENE: ICD-10-CM

## 2023-02-24 ENCOUNTER — TRANSCRIBE ORDERS (OUTPATIENT)
Dept: LAB | Facility: CLINIC | Age: 33
End: 2023-02-24

## 2023-02-24 ENCOUNTER — APPOINTMENT (OUTPATIENT)
Dept: LAB | Facility: CLINIC | Age: 33
End: 2023-02-24

## 2023-02-24 DIAGNOSIS — Z01.818 PRE-OPERATIVE CLEARANCE: Primary | ICD-10-CM

## 2023-02-24 DIAGNOSIS — Z01.818 PRE-OPERATIVE CLEARANCE: ICD-10-CM

## 2023-02-24 LAB
ALBUMIN SERPL BCP-MCNC: 3.8 G/DL (ref 3.5–5)
ALP SERPL-CCNC: 53 U/L (ref 46–116)
ALT SERPL W P-5'-P-CCNC: 20 U/L (ref 12–78)
ANION GAP SERPL CALCULATED.3IONS-SCNC: 5 MMOL/L (ref 4–13)
APTT PPP: 33 SECONDS (ref 23–37)
AST SERPL W P-5'-P-CCNC: 12 U/L (ref 5–45)
BACTERIA UR QL AUTO: ABNORMAL /HPF
BILIRUB SERPL-MCNC: 0.36 MG/DL (ref 0.2–1)
BILIRUB UR QL STRIP: NEGATIVE
BUN SERPL-MCNC: 14 MG/DL (ref 5–25)
CALCIUM SERPL-MCNC: 8.9 MG/DL (ref 8.3–10.1)
CHLORIDE SERPL-SCNC: 109 MMOL/L (ref 96–108)
CLARITY UR: CLEAR
CO2 SERPL-SCNC: 25 MMOL/L (ref 21–32)
COLOR UR: YELLOW
CREAT SERPL-MCNC: 0.7 MG/DL (ref 0.6–1.3)
ERYTHROCYTE [DISTWIDTH] IN BLOOD BY AUTOMATED COUNT: 13.9 % (ref 11.6–15.1)
EST. AVERAGE GLUCOSE BLD GHB EST-MCNC: 97 MG/DL
GFR SERPL CREATININE-BSD FRML MDRD: 113 ML/MIN/1.73SQ M
GLUCOSE P FAST SERPL-MCNC: 83 MG/DL (ref 65–99)
GLUCOSE UR STRIP-MCNC: NEGATIVE MG/DL
HBA1C MFR BLD: 5 %
HCG SERPL QL: NEGATIVE
HCT VFR BLD AUTO: 33.6 % (ref 34.8–46.1)
HCV AB SER QL: NORMAL
HGB BLD-MCNC: 10.3 G/DL (ref 11.5–15.4)
HGB UR QL STRIP.AUTO: NEGATIVE
HIV 1+2 AB+HIV1 P24 AG SERPL QL IA: NORMAL
HIV 2 AB SERPL QL IA: NORMAL
HIV1 AB SERPL QL IA: NORMAL
HIV1 P24 AG SERPL QL IA: NORMAL
INR PPP: 0.97 (ref 0.84–1.19)
KETONES UR STRIP-MCNC: NEGATIVE MG/DL
LEUKOCYTE ESTERASE UR QL STRIP: NEGATIVE
MCH RBC QN AUTO: 24.6 PG (ref 26.8–34.3)
MCHC RBC AUTO-ENTMCNC: 30.7 G/DL (ref 31.4–37.4)
MCV RBC AUTO: 80 FL (ref 82–98)
MUCOUS THREADS UR QL AUTO: ABNORMAL
NITRITE UR QL STRIP: NEGATIVE
NON-SQ EPI CELLS URNS QL MICRO: ABNORMAL /HPF
PH UR STRIP.AUTO: 6 [PH]
PLATELET # BLD AUTO: 298 THOUSANDS/UL (ref 149–390)
PMV BLD AUTO: 11.6 FL (ref 8.9–12.7)
POTASSIUM SERPL-SCNC: 3.9 MMOL/L (ref 3.5–5.3)
PROT SERPL-MCNC: 6.8 G/DL (ref 6.4–8.4)
PROT UR STRIP-MCNC: ABNORMAL MG/DL
PROTHROMBIN TIME: 13.1 SECONDS (ref 11.6–14.5)
RBC # BLD AUTO: 4.18 MILLION/UL (ref 3.81–5.12)
RBC #/AREA URNS AUTO: ABNORMAL /HPF
SODIUM SERPL-SCNC: 139 MMOL/L (ref 135–147)
SP GR UR STRIP.AUTO: 1.03 (ref 1–1.03)
TSH SERPL DL<=0.05 MIU/L-ACNC: 1.51 UIU/ML (ref 0.45–4.5)
UROBILINOGEN UR STRIP-ACNC: <2 MG/DL
WBC # BLD AUTO: 4.91 THOUSAND/UL (ref 4.31–10.16)
WBC #/AREA URNS AUTO: ABNORMAL /HPF

## 2023-03-11 ENCOUNTER — APPOINTMENT (OUTPATIENT)
Dept: LAB | Age: 33
End: 2023-03-11

## 2023-03-11 DIAGNOSIS — Z01.818 OTHER SPECIFIED PRE-OPERATIVE EXAMINATION: ICD-10-CM

## 2023-03-11 DIAGNOSIS — Z01.818 PREOP EXAMINATION: ICD-10-CM

## 2023-03-11 LAB
ATRIAL RATE: 81 BPM
P AXIS: 55 DEGREES
PR INTERVAL: 142 MS
QRS AXIS: 62 DEGREES
QRSD INTERVAL: 86 MS
QT INTERVAL: 370 MS
QTC INTERVAL: 429 MS
T WAVE AXIS: 58 DEGREES
VENTRICULAR RATE: 81 BPM

## 2023-03-16 ENCOUNTER — RA CDI HCC (OUTPATIENT)
Dept: OTHER | Facility: HOSPITAL | Age: 33
End: 2023-03-16

## 2023-03-16 NOTE — PROGRESS NOTES
[facility]  coding opportunities          Chart Reviewed number of suggestions sent to Provider: 1   [id number]    Patients Insurance        Commercial Insurance: [employer]

## 2023-03-17 ENCOUNTER — OFFICE VISIT (OUTPATIENT)
Dept: FAMILY MEDICINE CLINIC | Facility: CLINIC | Age: 33
End: 2023-03-17

## 2023-03-17 ENCOUNTER — TRANSCRIBE ORDERS (OUTPATIENT)
Dept: LAB | Facility: CLINIC | Age: 33
End: 2023-03-17

## 2023-03-17 ENCOUNTER — APPOINTMENT (OUTPATIENT)
Dept: LAB | Facility: CLINIC | Age: 33
End: 2023-03-17

## 2023-03-17 VITALS
BODY MASS INDEX: 31.3 KG/M2 | HEART RATE: 83 BPM | OXYGEN SATURATION: 100 % | DIASTOLIC BLOOD PRESSURE: 70 MMHG | SYSTOLIC BLOOD PRESSURE: 110 MMHG | RESPIRATION RATE: 16 BRPM | WEIGHT: 165.8 LBS | TEMPERATURE: 98.3 F | HEIGHT: 61 IN

## 2023-03-17 DIAGNOSIS — E66.09 CLASS 1 OBESITY DUE TO EXCESS CALORIES WITHOUT SERIOUS COMORBIDITY WITH BODY MASS INDEX (BMI) OF 31.0 TO 31.9 IN ADULT: Primary | ICD-10-CM

## 2023-03-17 DIAGNOSIS — Z01.818 OTHER SPECIFIED PRE-OPERATIVE EXAMINATION: Primary | ICD-10-CM

## 2023-03-17 DIAGNOSIS — Z01.818 PRE-OP EXAMINATION: ICD-10-CM

## 2023-03-17 DIAGNOSIS — Z01.818 OTHER SPECIFIED PRE-OPERATIVE EXAMINATION: ICD-10-CM

## 2023-03-17 DIAGNOSIS — Z00.01 ABNORMAL WELLNESS EXAM: ICD-10-CM

## 2023-03-17 DIAGNOSIS — L98.7 EXCESS SKIN OF ABDOMINAL WALL: ICD-10-CM

## 2023-03-17 DIAGNOSIS — D50.8 OTHER IRON DEFICIENCY ANEMIA: ICD-10-CM

## 2023-03-17 PROBLEM — D64.9 ABSOLUTE ANEMIA: Status: ACTIVE | Noted: 2023-03-17

## 2023-03-17 PROBLEM — E66.811 CLASS 1 OBESITY DUE TO EXCESS CALORIES WITHOUT SERIOUS COMORBIDITY WITH BODY MASS INDEX (BMI) OF 31.0 TO 31.9 IN ADULT: Status: ACTIVE | Noted: 2021-02-12

## 2023-03-17 LAB
ALBUMIN SERPL BCP-MCNC: 3.9 G/DL (ref 3.5–5)
ALP SERPL-CCNC: 63 U/L (ref 46–116)
ALT SERPL W P-5'-P-CCNC: 19 U/L (ref 12–78)
ANION GAP SERPL CALCULATED.3IONS-SCNC: 2 MMOL/L (ref 4–13)
APTT PPP: 32 SECONDS (ref 23–37)
AST SERPL W P-5'-P-CCNC: 12 U/L (ref 5–45)
BACTERIA UR QL AUTO: ABNORMAL /HPF
BASOPHILS # BLD AUTO: 0.06 THOUSANDS/ÂΜL (ref 0–0.1)
BASOPHILS NFR BLD AUTO: 1 % (ref 0–1)
BILIRUB SERPL-MCNC: 0.34 MG/DL (ref 0.2–1)
BILIRUB UR QL STRIP: NEGATIVE
BUN SERPL-MCNC: 13 MG/DL (ref 5–25)
CALCIUM SERPL-MCNC: 8.6 MG/DL (ref 8.3–10.1)
CHLORIDE SERPL-SCNC: 108 MMOL/L (ref 96–108)
CLARITY UR: CLEAR
CO2 SERPL-SCNC: 28 MMOL/L (ref 21–32)
COLOR UR: ABNORMAL
CREAT SERPL-MCNC: 0.66 MG/DL (ref 0.6–1.3)
EOSINOPHIL # BLD AUTO: 0.13 THOUSAND/ÂΜL (ref 0–0.61)
EOSINOPHIL NFR BLD AUTO: 2 % (ref 0–6)
ERYTHROCYTE [DISTWIDTH] IN BLOOD BY AUTOMATED COUNT: 14.3 % (ref 11.6–15.1)
GFR SERPL CREATININE-BSD FRML MDRD: 115 ML/MIN/1.73SQ M
GLUCOSE P FAST SERPL-MCNC: 84 MG/DL (ref 65–99)
GLUCOSE UR STRIP-MCNC: NEGATIVE MG/DL
HCG SERPL QL: NEGATIVE
HCT VFR BLD AUTO: 35.6 % (ref 34.8–46.1)
HGB BLD-MCNC: 10.7 G/DL (ref 11.5–15.4)
HGB UR QL STRIP.AUTO: NEGATIVE
IMM GRANULOCYTES # BLD AUTO: 0.01 THOUSAND/UL (ref 0–0.2)
IMM GRANULOCYTES NFR BLD AUTO: 0 % (ref 0–2)
INR PPP: 0.96 (ref 0.84–1.19)
KETONES UR STRIP-MCNC: NEGATIVE MG/DL
LEUKOCYTE ESTERASE UR QL STRIP: NEGATIVE
LYMPHOCYTES # BLD AUTO: 1.86 THOUSANDS/ÂΜL (ref 0.6–4.47)
LYMPHOCYTES NFR BLD AUTO: 32 % (ref 14–44)
MCH RBC QN AUTO: 24.3 PG (ref 26.8–34.3)
MCHC RBC AUTO-ENTMCNC: 30.1 G/DL (ref 31.4–37.4)
MCV RBC AUTO: 81 FL (ref 82–98)
MONOCYTES # BLD AUTO: 0.39 THOUSAND/ÂΜL (ref 0.17–1.22)
MONOCYTES NFR BLD AUTO: 7 % (ref 4–12)
MUCOUS THREADS UR QL AUTO: ABNORMAL
NEUTROPHILS # BLD AUTO: 3.37 THOUSANDS/ÂΜL (ref 1.85–7.62)
NEUTS SEG NFR BLD AUTO: 58 % (ref 43–75)
NITRITE UR QL STRIP: NEGATIVE
NON-SQ EPI CELLS URNS QL MICRO: ABNORMAL /HPF
NRBC BLD AUTO-RTO: 0 /100 WBCS
PH UR STRIP.AUTO: 6.5 [PH]
PLATELET # BLD AUTO: 284 THOUSANDS/UL (ref 149–390)
PMV BLD AUTO: 12 FL (ref 8.9–12.7)
POTASSIUM SERPL-SCNC: 3.8 MMOL/L (ref 3.5–5.3)
PROT SERPL-MCNC: 6.8 G/DL (ref 6.4–8.4)
PROT UR STRIP-MCNC: ABNORMAL MG/DL
PROTHROMBIN TIME: 13 SECONDS (ref 11.6–14.5)
RBC # BLD AUTO: 4.41 MILLION/UL (ref 3.81–5.12)
RBC #/AREA URNS AUTO: ABNORMAL /HPF
SODIUM SERPL-SCNC: 138 MMOL/L (ref 135–147)
SP GR UR STRIP.AUTO: 1.03 (ref 1–1.03)
UROBILINOGEN UR STRIP-ACNC: <2 MG/DL
WBC # BLD AUTO: 5.82 THOUSAND/UL (ref 4.31–10.16)
WBC #/AREA URNS AUTO: ABNORMAL /HPF

## 2023-03-17 RX ORDER — MULTIVITAMIN
1 TABLET ORAL DAILY
COMMUNITY

## 2023-03-17 RX ORDER — FERROUS SULFATE 325(65) MG
325 TABLET ORAL
COMMUNITY

## 2023-03-17 NOTE — PROGRESS NOTES
Name: [place] Claude      : 1990      MRN: [id number]  Encounter Provider: Cheryle Soda, MD  Encounter Date: 3/17/2023   Encounter department: [address] [place] [address]  Class 1 obesity due to excess calories without serious comorbidity with body mass index (BMI) of 31 0 to 31 9 in adult  Assessment & Plan:  Improving status post gastric bypass  Continue with low-carb diet and regular exercise  She is going for excess skin removal       2  Excess skin of abdominal wall  Assessment & Plan:  She is going for excess skin removal procedure  3  Pre-op examination  Assessment & Plan:  I reviewed her preop testing all are acceptable  Recommended follow-up with H&H postprocedure to monitor her hemoglobin  4  Abnormal wellness exam  Assessment & Plan: It was discussed about immunizations, diet, exercise and safety measures  5  Other iron deficiency anemia  Assessment & Plan:  Slight improvement  Continue on ferrous sulfate  We will continue to monitor  Subjective     She is here today for preop clearance for excess skin removal she is having them done in [place]  She had a preop testing  She denies any complaint  Her blood work showed slightly low H&H but improved from before  She has been taking her ferrous sulfate  Review of Systems   Constitutional: Negative for chills and fever  HENT: Negative for trouble swallowing  Eyes: Negative for visual disturbance  Respiratory: Negative for cough and shortness of breath  Cardiovascular: Negative for chest pain, palpitations and leg swelling  Gastrointestinal: Negative for abdominal pain, constipation and diarrhea  Endocrine: Negative for cold intolerance and heat intolerance  Genitourinary: Negative for difficulty urinating and dysuria  Musculoskeletal: Negative for gait problem  Skin: Negative for rash     Neurological: Negative for dizziness, tremors, seizures and headaches  Hematological: Negative for adenopathy  Psychiatric/Behavioral: Negative for behavioral problems  Past Medical History:   Diagnosis Date   • Asthma    • Female infertility     PCOS related   • History of COVID-19     2021- not hospitalized   • Obesity, morbid, BMI 40 0-49 9 ([facility] [address] )    • Polycystic ovary syndrome    • Varicella      Past Surgical History:   Procedure Laterality Date   •  SECTION      x1   • EGD     • [place] LAPS GSTR RSTCV PX W/BYP MONIQUE-EN-Y LIMB <150 CM N/A 2021    Procedure: BYPASS GASTRIC MONIQUE-EN-Y LAPAROSCOPIC W ROBOTICS W/ INTRAOP EGD;  Surgeon: Alinda Epley, MD;  Location: [facility] OR;  Service: Bariatrics     Family History   Problem Relation Age of Onset   • Breast cancer Family    • Lung cancer Family    • Fibroids Mother    • Heart disease Mother    • HIV Mother    • Kidney disease Mother    • Asthma Son    • Lung cancer Maternal Grandmother    • Diabetes Maternal Grandmother    • Prostate cancer Maternal Grandfather    • Breast cancer Maternal Grandfather    • Diabetes Maternal Grandfather    • No Known Problems Father      Social History     Socioeconomic History   • Marital status: /Civil Union     Spouse name: None   • Number of children: None   • Years of education: None   • Highest education level: None   Occupational History   • None   Tobacco Use   • Smoking status: Never   • Smokeless tobacco: Never   • Tobacco comments:     no passive smoke exposure   Vaping Use   • Vaping Use: Never used   Substance and Sexual Activity   • Alcohol use: No   • Drug use: No   • Sexual activity: Yes     Partners: Male     Birth control/protection: I U D     Other Topics Concern   • None   Social History Narrative   • None     Social Determinants of Health     Financial Resource Strain: Not on file   Food Insecurity: Not on file   Transportation Needs: Not on file   Physical Activity: Not on file   Stress: Not on file   Social Connections: Not on file Intimate Partner Violence: Not on file   Housing Stability: Not on file     Current Outpatient Medications on File Prior to Visit   Medication Sig   • ferrous sulfate 325 (65 Fe) mg tablet Take 325 mg by mouth daily with breakfast   • Multiple Vitamin (multivitamin) tablet Take 1 tablet by mouth daily   • Advair Diskus 100-50 MCG/DOSE inhaler INHALE 1 PUFF 2 (TWO) TIMES A DAY RINSE MOUTH AFTER USE (Patient not taking: Reported on 3/24/2022)   • hydrocortisone 1 % cream APPLY 1 APPLICATION TOPICALLY 4 (FOUR) TIMES A DAY AS NEEDED (APPLY TO PERINEUM) (Patient not taking: Reported on 3/17/2023)   • norelgestromin-ethinyl estradiol (ORTHO EVRA) 150-35 MCG/24HR Apply patch weekly for 3 weeks, then remove and leave off for 1 week  (Patient not taking: Reported on 12/9/2022)   • pantoprazole (PROTONIX) 40 mg tablet Take 1 tablet (40 mg total) by mouth daily (Patient not taking: Reported on 3/17/2023)     No Known Allergies  Immunization History   Administered Date(s) Administered   • Influenza Quadrivalent, 6-35 Months IM 01/05/2015   • Tdap 06/02/2020       Objective     /70 (BP Location: Left arm, Patient Position: Sitting, Cuff Size: Large)   Pulse 83   Temp 98 3 °F (36 8 °C) (Tympanic)   Resp 16   Ht 5' 1" (1 549 m)   Wt 75 2 kg (165 lb 12 8 oz)   SpO2 100%   BMI 31 33 kg/m²     Physical Exam  Vitals and nursing note reviewed  Constitutional:       Appearance: She is well-developed  HENT:      Head: Normocephalic and atraumatic  Eyes:      Pupils: Pupils are equal, round, and reactive to light  Cardiovascular:      Rate and Rhythm: Normal rate and regular rhythm  Heart sounds: Normal heart sounds  Pulmonary:      Effort: Pulmonary effort is normal       Breath sounds: Normal breath sounds  Abdominal:      General: Bowel sounds are normal       Palpations: Abdomen is soft  Musculoskeletal:         General: Normal range of motion        Cervical back: Normal range of motion and neck supple  Lymphadenopathy:      Cervical: No cervical adenopathy  Skin:     General: Skin is warm  Neurological:      Mental Status: She is alert and oriented to person, place, and time  Cranial Nerves: No cranial nerve deficit         Melodie Hung MD

## 2023-03-17 NOTE — ASSESSMENT & PLAN NOTE
I reviewed her preop testing all are acceptable  Recommended follow-up with H&H postprocedure to monitor her hemoglobin

## 2023-03-17 NOTE — ASSESSMENT & PLAN NOTE
Improving status post gastric bypass  Continue with low-carb diet and regular exercise    She is going for excess skin removal

## 2023-03-17 NOTE — PROGRESS NOTES
Name: Tim Wallace      : 1990      MRN: [id number]  Encounter Provider: Kevin Stokes MD  Encounter Date: 3/17/2023   Encounter department: [address] [place] [address]  Class 1 obesity due to excess calories without serious comorbidity with body mass index (BMI) of 31 0 to 31 9 in adult  Assessment & Plan:  Improving status post gastric bypass  Continue with low-carb diet and regular exercise  She is going for excess skin removal       2  Excess skin of abdominal wall  Assessment & Plan:  She is going for excess skin removal procedure  3  Pre-op examination  Assessment & Plan:  I reviewed her preop testing all are acceptable  Recommended follow-up with H&H postprocedure to monitor her hemoglobin  4  Abnormal wellness exam  Assessment & Plan: It was discussed about immunizations, diet, exercise and safety measures  5  Other iron deficiency anemia  Assessment & Plan:  Slight improvement  Continue on ferrous sulfate  We will continue to monitor  BMI Counseling: Body mass index is 31 33 kg/m²  The BMI is above normal  Nutrition recommendations include decreasing portion sizes, encouraging healthy choices of fruits and vegetables and decreasing fast food intake  Rationale for BMI follow-up plan is due to patient being overweight or obese  Subjective     She is here today for preop clearance for excess skin removal she is having them done in [place]  She had a preop testing  She denies any complaint  Her blood work showed slightly low H&H but improved from before  She has been taking her ferrous sulfate  Review of Systems   Constitutional: Negative for chills and fever  HENT: Negative for trouble swallowing  Eyes: Negative for visual disturbance  Respiratory: Negative for cough and shortness of breath  Cardiovascular: Negative for chest pain, palpitations and leg swelling     Gastrointestinal: Negative for abdominal pain, constipation and diarrhea  Endocrine: Negative for cold intolerance and heat intolerance  Genitourinary: Negative for difficulty urinating and dysuria  Musculoskeletal: Negative for gait problem  Skin: Negative for rash  Neurological: Negative for dizziness, tremors, seizures and headaches  Hematological: Negative for adenopathy  Psychiatric/Behavioral: Negative for behavioral problems  Past Medical History:   Diagnosis Date   • Asthma    • Female infertility     PCOS related   • History of COVID-19     2021- not hospitalized   • Obesity, morbid, BMI 40 0-49 9 ([facility] [address] )    • Polycystic ovary syndrome    • Varicella      Past Surgical History:   Procedure Laterality Date   •  SECTION      x1   • EGD     • [place] LAPS GSTR RSTCV PX W/BYP MONIQUE-EN-Y LIMB <150 CM N/A 2021    Procedure: BYPASS GASTRIC MONIQUE-EN-Y LAPAROSCOPIC W ROBOTICS W/ INTRAOP EGD;  Surgeon: Nicola Hernandez MD;  Location: [facility] OR;  Service: Bariatrics     Family History   Problem Relation Age of Onset   • Breast cancer Family    • Lung cancer Family    • Fibroids Mother    • Heart disease Mother    • HIV Mother    • Kidney disease Mother    • Asthma Son    • Lung cancer Maternal Grandmother    • Diabetes Maternal Grandmother    • Prostate cancer Maternal Grandfather    • Breast cancer Maternal Grandfather    • Diabetes Maternal Grandfather    • No Known Problems Father      Social History     Socioeconomic History   • Marital status: /Civil Union     Spouse name: None   • Number of children: None   • Years of education: None   • Highest education level: None   Occupational History   • None   Tobacco Use   • Smoking status: Never   • Smokeless tobacco: Never   • Tobacco comments:     no passive smoke exposure   Vaping Use   • Vaping Use: Never used   Substance and Sexual Activity   • Alcohol use: No   • Drug use: No   • Sexual activity: Yes     Partners: Male     Birth control/protection: I U D  Other Topics Concern   • None   Social History Narrative   • None     Social Determinants of Health     Financial Resource Strain: Not on file   Food Insecurity: Not on file   Transportation Needs: Not on file   Physical Activity: Not on file   Stress: Not on file   Social Connections: Not on file   Intimate Partner Violence: Not on file   Housing Stability: Not on file     Current Outpatient Medications on File Prior to Visit   Medication Sig   • ferrous sulfate 325 (65 Fe) mg tablet Take 325 mg by mouth daily with breakfast   • Multiple Vitamin (multivitamin) tablet Take 1 tablet by mouth daily   • Advair Diskus 100-50 MCG/DOSE inhaler INHALE 1 PUFF 2 (TWO) TIMES A DAY RINSE MOUTH AFTER USE (Patient not taking: Reported on 3/24/2022)   • hydrocortisone 1 % cream APPLY 1 APPLICATION TOPICALLY 4 (FOUR) TIMES A DAY AS NEEDED (APPLY TO PERINEUM) (Patient not taking: Reported on 3/17/2023)   • norelgestromin-ethinyl estradiol (ORTHO EVRA) 150-35 MCG/24HR Apply patch weekly for 3 weeks, then remove and leave off for 1 week  (Patient not taking: Reported on 12/9/2022)   • pantoprazole (PROTONIX) 40 mg tablet Take 1 tablet (40 mg total) by mouth daily (Patient not taking: Reported on 3/17/2023)     No Known Allergies  Immunization History   Administered Date(s) Administered   • Influenza Quadrivalent, 6-35 Months IM 01/05/2015   • Tdap 06/02/2020       Objective     /70 (BP Location: Left arm, Patient Position: Sitting, Cuff Size: Large)   Pulse 83   Temp 98 3 °F (36 8 °C) (Tympanic)   Resp 16   Ht 5' 1" (1 549 m)   Wt 75 2 kg (165 lb 12 8 oz)   SpO2 100%   BMI 31 33 kg/m²     Physical Exam  Vitals and nursing note reviewed  Constitutional:       Appearance: She is well-developed  HENT:      Head: Normocephalic and atraumatic  Eyes:      Pupils: Pupils are equal, round, and reactive to light  Cardiovascular:      Rate and Rhythm: Normal rate and regular rhythm        Heart sounds: Normal heart sounds  Pulmonary:      Effort: Pulmonary effort is normal       Breath sounds: Normal breath sounds  Abdominal:      General: Bowel sounds are normal       Palpations: Abdomen is soft  Musculoskeletal:         General: Normal range of motion  Cervical back: Normal range of motion and neck supple  Lymphadenopathy:      Cervical: No cervical adenopathy  Skin:     General: Skin is warm  Neurological:      Mental Status: She is alert and oriented to person, place, and time  Cranial Nerves: No cranial nerve deficit         Rob Hopkins MD

## 2023-03-22 ENCOUNTER — TELEPHONE (OUTPATIENT)
Dept: FAMILY MEDICINE CLINIC | Facility: CLINIC | Age: 33
End: 2023-03-22

## 2023-03-22 DIAGNOSIS — D50.8 OTHER IRON DEFICIENCY ANEMIA: Primary | ICD-10-CM

## 2023-03-31 ENCOUNTER — APPOINTMENT (OUTPATIENT)
Dept: LAB | Facility: CLINIC | Age: 33
End: 2023-03-31

## 2023-03-31 DIAGNOSIS — D50.8 OTHER IRON DEFICIENCY ANEMIA: ICD-10-CM

## 2023-03-31 LAB
BASOPHILS # BLD AUTO: 0.06 THOUSANDS/ÂΜL (ref 0–0.1)
BASOPHILS NFR BLD AUTO: 1 % (ref 0–1)
EOSINOPHIL # BLD AUTO: 0.13 THOUSAND/ÂΜL (ref 0–0.61)
EOSINOPHIL NFR BLD AUTO: 2 % (ref 0–6)
ERYTHROCYTE [DISTWIDTH] IN BLOOD BY AUTOMATED COUNT: 16.2 % (ref 11.6–15.1)
HCT VFR BLD AUTO: 34.5 % (ref 34.8–46.1)
HGB BLD-MCNC: 10.6 G/DL (ref 11.5–15.4)
IMM GRANULOCYTES # BLD AUTO: 0.02 THOUSAND/UL (ref 0–0.2)
IMM GRANULOCYTES NFR BLD AUTO: 0 % (ref 0–2)
LYMPHOCYTES # BLD AUTO: 1.95 THOUSANDS/ÂΜL (ref 0.6–4.47)
LYMPHOCYTES NFR BLD AUTO: 33 % (ref 14–44)
MCH RBC QN AUTO: 25 PG (ref 26.8–34.3)
MCHC RBC AUTO-ENTMCNC: 30.7 G/DL (ref 31.4–37.4)
MCV RBC AUTO: 81 FL (ref 82–98)
MONOCYTES # BLD AUTO: 0.46 THOUSAND/ÂΜL (ref 0.17–1.22)
MONOCYTES NFR BLD AUTO: 8 % (ref 4–12)
NEUTROPHILS # BLD AUTO: 3.29 THOUSANDS/ÂΜL (ref 1.85–7.62)
NEUTS SEG NFR BLD AUTO: 56 % (ref 43–75)
NRBC BLD AUTO-RTO: 0 /100 WBCS
PLATELET # BLD AUTO: 315 THOUSANDS/UL (ref 149–390)
PMV BLD AUTO: 11.9 FL (ref 8.9–12.7)
RBC # BLD AUTO: 4.24 MILLION/UL (ref 3.81–5.12)
WBC # BLD AUTO: 5.91 THOUSAND/UL (ref 4.31–10.16)

## 2023-04-04 ENCOUNTER — TELEPHONE (OUTPATIENT)
Dept: FAMILY MEDICINE CLINIC | Facility: CLINIC | Age: 33
End: 2023-04-04

## 2023-04-04 NOTE — TELEPHONE ENCOUNTER
----- Message from Guillermo Buchanan MD sent at 4/4/2023  6:41 AM EDT -----  Hemoglobin came back stable at 10 6

## 2023-04-06 ENCOUNTER — TELEPHONE (OUTPATIENT)
Dept: FAMILY MEDICINE CLINIC | Facility: CLINIC | Age: 33
End: 2023-04-06

## 2023-05-02 ENCOUNTER — CONSULT (OUTPATIENT)
Dept: HEMATOLOGY ONCOLOGY | Facility: CLINIC | Age: 33
End: 2023-05-02

## 2023-05-02 VITALS
DIASTOLIC BLOOD PRESSURE: 80 MMHG | HEART RATE: 108 BPM | WEIGHT: 166 LBS | SYSTOLIC BLOOD PRESSURE: 132 MMHG | HEIGHT: 61 IN | BODY MASS INDEX: 31.34 KG/M2 | OXYGEN SATURATION: 97 % | RESPIRATION RATE: 18 BRPM

## 2023-05-02 DIAGNOSIS — D50.8 OTHER IRON DEFICIENCY ANEMIA: ICD-10-CM

## 2023-05-02 DIAGNOSIS — E63.9 NUTRITIONAL DEFICIENCY: ICD-10-CM

## 2023-05-02 DIAGNOSIS — D50.8 IRON DEFICIENCY ANEMIA SECONDARY TO INADEQUATE DIETARY IRON INTAKE: Primary | ICD-10-CM

## 2023-05-02 DIAGNOSIS — K91.2 POSTSURGICAL MALABSORPTION: ICD-10-CM

## 2023-05-02 DIAGNOSIS — D50.9 IRON DEFICIENCY ANEMIA, UNSPECIFIED IRON DEFICIENCY ANEMIA TYPE: ICD-10-CM

## 2023-05-03 ENCOUNTER — APPOINTMENT (OUTPATIENT)
Dept: LAB | Facility: CLINIC | Age: 33
End: 2023-05-03

## 2023-05-03 DIAGNOSIS — K91.2 POSTSURGICAL MALABSORPTION: ICD-10-CM

## 2023-05-03 DIAGNOSIS — D50.8 IRON DEFICIENCY ANEMIA SECONDARY TO INADEQUATE DIETARY IRON INTAKE: Primary | ICD-10-CM

## 2023-05-03 DIAGNOSIS — E63.9 NUTRITIONAL DEFICIENCY: ICD-10-CM

## 2023-05-03 DIAGNOSIS — D50.8 IRON DEFICIENCY ANEMIA SECONDARY TO INADEQUATE DIETARY IRON INTAKE: ICD-10-CM

## 2023-05-03 PROBLEM — D50.9 IRON DEFICIENCY ANEMIA, UNSPECIFIED: Status: ACTIVE | Noted: 2023-05-03

## 2023-05-03 LAB
FERRITIN SERPL-MCNC: 3 NG/ML (ref 8–388)
FOLATE SERPL-MCNC: 17 NG/ML (ref 3.1–17.5)
IRON SATN MFR SERPL: 5 % (ref 15–50)
IRON SERPL-MCNC: 22 UG/DL (ref 50–170)
TIBC SERPL-MCNC: 414 UG/DL (ref 250–450)
VIT B12 SERPL-MCNC: 624 PG/ML (ref 100–900)

## 2023-05-03 RX ORDER — SODIUM CHLORIDE 9 MG/ML
20 INJECTION, SOLUTION INTRAVENOUS ONCE
OUTPATIENT
Start: 2023-05-10

## 2023-05-04 NOTE — PROGRESS NOTES
Oncology Outpatient Consult Note  Kim Hu 27 y o  female MRN: @ Encounter: [id number]        Date:  5/2/23        CC:  Iron Deficiency anemia      HPI:  Kim Hu is seen for initial consultation 5/3/2023 regarding iron deficiency anemia  Patient is s/p bariatric surgery 9/7/2021  Patient has no significant PMH  She is c/o of severe fatigue, lightheadedness, PICA (ice chips), RLS  She does have intermittent headaches  Patient has 5 children, all normal deliveries  Her periods are between 37-40 day cycle  Her periods last 5 days, days 1-2 heavier days  She is taking oral iron, B12 and multivitamin supplements  Najma's last iron panel from 8/2022: serum iron 87, % saturation 22, ferritin 5  Most recent CBC 3/31/23: Hgb 10 6, MCV 81  ROS: As stated in history of present illness otherwise her 14 point review of systems today was negative  ECOG PS: 0      Test Results:    Imaging: No results found      Labs:   Lab Results   Component Value Date    WBC 5 91 03/31/2023    HGB 10 6 (L) 03/31/2023    HCT 34 5 (L) 03/31/2023    MCV 81 (L) 03/31/2023     03/31/2023     Lab Results   Component Value Date    K 3 8 03/17/2023     03/17/2023    CO2 28 03/17/2023    BUN 13 03/17/2023    CREATININE 0 66 03/17/2023    GLUF 84 03/17/2023    CALCIUM 8 6 03/17/2023    AST 12 03/17/2023    ALT 19 03/17/2023    ALKPHOS 63 03/17/2023    EGFR 115 03/17/2023       Lab Results   Component Value Date    IRON 22 (L) 05/03/2023    TIBC 414 05/03/2023    FERRITIN 3 (L) 05/03/2023       Lab Results   Component Value Date    XFNTAPVC29 624 05/03/2023       Active Problems:   Patient Active Problem List   Diagnosis    Eczema, dyshidrotic    Acne    Carpal tunnel syndrome    Fracture, tooth    Irregular menstrual cycle    Overweight    PCOS (polycystic ovarian syndrome)    Vitamin D deficiency    BMI 40 0-44 9, adult (HCC)    Elevated glucose tolerance test    Class 1 obesity due to excess calories without serious comorbidity with body mass index (BMI) of 31 0 to 31 9 in adult    Asthma    Excess skin of abdominal wall    Pre-op examination    Abnormal wellness exam    Absolute anemia       Past Medical History:   Past Medical History:   Diagnosis Date    Asthma     Female infertility     PCOS related    History of COVID-19     2021- not hospitalized    Obesity, morbid, BMI 40 0-49 9 ([facility] [address]     Polycystic ovary syndrome     Varicella        Surgical History:   Past Surgical History:   Procedure Laterality Date     SECTION      x1    EGD      [place] LAPS GSTR RSTCV PX W/BYP MONIQUE-EN-Y LIMB <150 CM N/A 2021    Procedure: BYPASS GASTRIC MONIQUE-EN-Y LAPAROSCOPIC W ROBOTICS W/ INTRAOP EGD;  Surgeon: Terry Ceron MD;  Location: [facility];  Service: Bariatrics       Family History:    Family History   Problem Relation Age of Onset    Breast cancer Family     Lung cancer Family     Fibroids Mother     Heart disease Mother     HIV Mother     Kidney disease Mother     Asthma Son     Lung cancer Maternal Grandmother     Diabetes Maternal Grandmother     Prostate cancer Maternal Grandfather     Breast cancer Maternal Grandfather     Diabetes Maternal Grandfather     No Known Problems Father        Cancer-related family history includes Breast cancer in her family and maternal grandfather; Lung cancer in her family and maternal grandmother; Prostate cancer in her maternal grandfather      Social History:   Social History     Socioeconomic History    Marital status: /Civil Union     Spouse name: Not on file    Number of children: Not on file    Years of education: Not on file    Highest education level: Not on file   Occupational History    Not on file   Tobacco Use    Smoking status: Never    Smokeless tobacco: Never    Tobacco comments:     no passive smoke exposure   Vaping Use    Vaping Use: Never used   Substance and Sexual Activity  Alcohol use: No    Drug use: No    Sexual activity: Yes     Partners: Male     Birth control/protection: I U D  Other Topics Concern    Not on file   Social History Narrative    Not on file     Social Determinants of Health     Financial Resource Strain: Not on file   Food Insecurity: Not on file   Transportation Needs: Not on file   Physical Activity: Not on file   Stress: Not on file   Social Connections: Not on file   Intimate Partner Violence: Not on file   Housing Stability: Not on file       Current Medications:   Current Outpatient Medications   Medication Sig Dispense Refill    Advair Diskus 100-50 MCG/DOSE inhaler INHALE 1 PUFF 2 (TWO) TIMES A DAY RINSE MOUTH AFTER USE (Patient not taking: Reported on 3/24/2022) 60 blister 0    ferrous sulfate 325 (65 Fe) mg tablet Take 325 mg by mouth daily with breakfast      hydrocortisone 1 % cream APPLY 1 APPLICATION TOPICALLY 4 (FOUR) TIMES A DAY AS NEEDED (APPLY TO PERINEUM) (Patient not taking: Reported on 3/17/2023) 28 35 g 5    Multiple Vitamin (multivitamin) tablet Take 1 tablet by mouth daily      norelgestromin-ethinyl estradiol (ORTHO EVRA) 150-35 MCG/24HR Apply patch weekly for 3 weeks, then remove and leave off for 1 week  (Patient not taking: Reported on 12/9/2022) 9 patch 4     No current facility-administered medications for this visit  Allergies: No Known Allergies      Physical Exam:    Body surface area is 1 75 meters squared      Wt Readings from Last 3 Encounters:   05/02/23 75 3 kg (166 lb)   04/14/23 75 kg (165 lb 6 4 oz)   03/17/23 75 2 kg (165 lb 12 8 oz)        Temp Readings from Last 3 Encounters:   04/14/23 98 9 °F (37 2 °C) (Tympanic)   03/17/23 98 3 °F (36 8 °C) (Tympanic)   12/09/22 98 8 °F (37 1 °C) (Tympanic)        BP Readings from Last 3 Encounters:   05/02/23 132/80   04/14/23 108/70   03/17/23 110/70         Pulse Readings from Last 3 Encounters:   05/02/23 (!) 108   04/14/23 94   03/17/23 83 @IVTKKUF0(8)@    Physical Exam     Constitutional   General appearance: No acute distress, well appearing and well nourished  Eyes   Conjunctiva and lids: No swelling, erythema or discharge  Pupils and irises: Equal, round and reactive to light  Ears, Nose, Mouth, and Throat   External inspection of ears and nose: Normal     Nasal mucosa, septum, and turbinates: Normal without edema or erythema  Oropharynx: Normal with no erythema, edema, exudate or lesions  Pulmonary   Respiratory effort: No increased work of breathing or signs of respiratory distress  Auscultation of lungs: Clear to auscultation  Cardiovascular   Palpation of heart: Normal PMI, no thrills  Auscultation of heart: Normal rate and rhythm, normal S1 and S2, without murmurs  Examination of extremities for edema and/or varicosities: Normal     Carotid pulses: Normal     Abdomen   Abdomen: Non-tender, no masses  Liver and spleen: No hepatomegaly or splenomegaly  Lymphatic   Palpation of lymph nodes in neck: No lymphadenopathy  Musculoskeletal   Gait and station: Normal     Digits and nails: Normal without clubbing or cyanosis  Inspection/palpation of joints, bones, and muscles: Normal     Skin   Skin and subcutaneous tissue: Normal without rashes or lesions  Neurologic   Cranial nerves: Cranial nerves 2-12 intact  Sensation: No sensory loss  Psychiatric   Orientation to person, place, and time: Normal     Mood and affect: Normal           Assessment/ Plan:  Discussed etiologies of iron deficiency, which include blood loss, reduced iron absorption, diet, medication, bariatric surgery or malignancy  Discussed her iron deficiency may be multifactorial, likely from malabsorption due to bariatric surgery and blood loss from her menstrual cycle  Patient will benefit from IV iron infusions therefore, recommend Venofer 300 mg weekly x 4 cycles    Patient educated about the iron product, administration and common adverse effects including but not limited to: Anaphylaxis/allergic reaction, arthralgias/myalgias, nausea; agrees to proceed with treatment  Patient will require a recent iron panel per insurance requirements  Informed Georgina Oshea I will place the order once I receive her results and our scheduling team will be in touch to schedule the infusions  Patient aware to contact us should she have any additional questions/concerns  ADDENDUM: Received patient's results, serum iron 22, % saturation 5, ferritin 3  Vitamin B12 624, folate 17 0  Informed patient through [facility] placed the order for her infusions, she will be contacted by our scheduling team   Patient confirmed message  I spent 60 minutes in chart review, face to face counseling, coordination of care, and documentation

## 2023-05-12 ENCOUNTER — HOSPITAL ENCOUNTER (OUTPATIENT)
Dept: INFUSION CENTER | Facility: HOSPITAL | Age: 33
Discharge: HOME | End: 2023-05-12
Attending: INTERNAL MEDICINE

## 2023-05-12 VITALS
OXYGEN SATURATION: 98 % | HEART RATE: 79 BPM | SYSTOLIC BLOOD PRESSURE: 100 MMHG | TEMPERATURE: 98.7 F | RESPIRATION RATE: 18 BRPM | DIASTOLIC BLOOD PRESSURE: 57 MMHG

## 2023-05-12 DIAGNOSIS — K91.2 POSTSURGICAL MALABSORPTION: Primary | ICD-10-CM

## 2023-05-12 DIAGNOSIS — D50.9 IRON DEFICIENCY ANEMIA, UNSPECIFIED IRON DEFICIENCY ANEMIA TYPE: ICD-10-CM

## 2023-05-12 RX ORDER — SODIUM CHLORIDE 9 MG/ML
20 INJECTION, SOLUTION INTRAVENOUS ONCE
Status: CANCELLED | OUTPATIENT
Start: 2023-05-19

## 2023-05-12 RX ORDER — SODIUM CHLORIDE 9 MG/ML
20 INJECTION, SOLUTION INTRAVENOUS ONCE
Status: COMPLETED | OUTPATIENT
Start: 2023-05-12 | End: 2023-05-12

## 2023-05-12 RX ADMIN — IRON SUCROSE 300 MG: 20 INJECTION, SOLUTION INTRAVENOUS at 11:58

## 2023-05-12 RX ADMIN — SODIUM CHLORIDE 20 ML/HR: 0.9 INJECTION, SOLUTION INTRAVENOUS at 11:57

## 2023-05-12 NOTE — PROGRESS NOTES
Pt tolerated treatment today with no adverse reactions  Declined AVS pt uses My Chart  Left unit ambulatory with a steady gait

## 2023-05-24 DIAGNOSIS — D50.9 IRON DEFICIENCY ANEMIA, UNSPECIFIED IRON DEFICIENCY ANEMIA TYPE: ICD-10-CM

## 2023-05-24 DIAGNOSIS — K91.2 POSTSURGICAL MALABSORPTION: Primary | ICD-10-CM

## 2023-05-24 RX ORDER — SODIUM CHLORIDE 9 MG/ML
20 INJECTION, SOLUTION INTRAVENOUS ONCE
Status: CANCELLED | OUTPATIENT
Start: 2023-05-26

## 2023-05-26 ENCOUNTER — HOSPITAL ENCOUNTER (OUTPATIENT)
Dept: INFUSION CENTER | Facility: HOSPITAL | Age: 33
End: 2023-05-26
Attending: INTERNAL MEDICINE

## 2023-05-26 VITALS
OXYGEN SATURATION: 100 % | HEART RATE: 90 BPM | TEMPERATURE: 98.9 F | RESPIRATION RATE: 18 BRPM | DIASTOLIC BLOOD PRESSURE: 70 MMHG | SYSTOLIC BLOOD PRESSURE: 104 MMHG

## 2023-05-26 DIAGNOSIS — D50.9 IRON DEFICIENCY ANEMIA, UNSPECIFIED IRON DEFICIENCY ANEMIA TYPE: ICD-10-CM

## 2023-05-26 DIAGNOSIS — K91.2 POSTSURGICAL MALABSORPTION: Primary | ICD-10-CM

## 2023-05-26 RX ORDER — SODIUM CHLORIDE 9 MG/ML
20 INJECTION, SOLUTION INTRAVENOUS ONCE
Status: CANCELLED | OUTPATIENT
Start: 2023-06-02

## 2023-05-26 RX ORDER — SODIUM CHLORIDE 9 MG/ML
20 INJECTION, SOLUTION INTRAVENOUS ONCE
Status: COMPLETED | OUTPATIENT
Start: 2023-05-26 | End: 2023-05-26

## 2023-05-26 RX ADMIN — SODIUM CHLORIDE 20 ML/HR: 0.9 INJECTION, SOLUTION INTRAVENOUS at 10:38

## 2023-05-26 RX ADMIN — SODIUM CHLORIDE 300 MG: 0.9 INJECTION, SOLUTION INTRAVENOUS at 10:38

## 2023-06-02 ENCOUNTER — HOSPITAL ENCOUNTER (OUTPATIENT)
Dept: INFUSION CENTER | Facility: HOSPITAL | Age: 33
End: 2023-06-02
Attending: INTERNAL MEDICINE

## 2023-06-07 DIAGNOSIS — K91.2 POSTSURGICAL MALABSORPTION: Primary | ICD-10-CM

## 2023-06-07 DIAGNOSIS — D50.9 IRON DEFICIENCY ANEMIA, UNSPECIFIED IRON DEFICIENCY ANEMIA TYPE: ICD-10-CM

## 2023-06-07 RX ORDER — SODIUM CHLORIDE 9 MG/ML
20 INJECTION, SOLUTION INTRAVENOUS ONCE
Status: CANCELLED | OUTPATIENT
Start: 2023-06-09

## 2023-06-09 ENCOUNTER — HOSPITAL ENCOUNTER (OUTPATIENT)
Dept: INFUSION CENTER | Facility: HOSPITAL | Age: 33
End: 2023-06-09
Attending: INTERNAL MEDICINE
Payer: COMMERCIAL

## 2023-06-09 VITALS
SYSTOLIC BLOOD PRESSURE: 109 MMHG | TEMPERATURE: 98 F | HEART RATE: 73 BPM | OXYGEN SATURATION: 100 % | RESPIRATION RATE: 18 BRPM | DIASTOLIC BLOOD PRESSURE: 73 MMHG

## 2023-06-09 DIAGNOSIS — K91.2 POSTSURGICAL MALABSORPTION: Primary | ICD-10-CM

## 2023-06-09 DIAGNOSIS — D50.9 IRON DEFICIENCY ANEMIA, UNSPECIFIED IRON DEFICIENCY ANEMIA TYPE: ICD-10-CM

## 2023-06-09 RX ORDER — SODIUM CHLORIDE 9 MG/ML
20 INJECTION, SOLUTION INTRAVENOUS ONCE
Status: CANCELLED | OUTPATIENT
Start: 2023-06-16

## 2023-06-09 RX ORDER — SODIUM CHLORIDE 9 MG/ML
20 INJECTION, SOLUTION INTRAVENOUS ONCE
Status: COMPLETED | OUTPATIENT
Start: 2023-06-09 | End: 2023-06-09

## 2023-06-09 RX ADMIN — SODIUM CHLORIDE 20 ML/HR: 0.9 INJECTION, SOLUTION INTRAVENOUS at 11:46

## 2023-06-09 RX ADMIN — SODIUM CHLORIDE 300 MG: 0.9 INJECTION, SOLUTION INTRAVENOUS at 11:49

## 2023-06-16 DIAGNOSIS — K91.2 POSTSURGICAL MALABSORPTION: Primary | ICD-10-CM

## 2023-06-16 DIAGNOSIS — D50.9 IRON DEFICIENCY ANEMIA, UNSPECIFIED IRON DEFICIENCY ANEMIA TYPE: ICD-10-CM

## 2023-06-16 RX ORDER — SODIUM CHLORIDE 9 MG/ML
20 INJECTION, SOLUTION INTRAVENOUS ONCE
Status: CANCELLED | OUTPATIENT
Start: 2023-06-19

## 2023-06-19 ENCOUNTER — HOSPITAL ENCOUNTER (OUTPATIENT)
Dept: INFUSION CENTER | Facility: HOSPITAL | Age: 33
Discharge: HOME | End: 2023-06-19
Attending: INTERNAL MEDICINE
Payer: COMMERCIAL

## 2023-06-19 VITALS
HEART RATE: 89 BPM | SYSTOLIC BLOOD PRESSURE: 113 MMHG | RESPIRATION RATE: 18 BRPM | DIASTOLIC BLOOD PRESSURE: 59 MMHG | OXYGEN SATURATION: 99 % | TEMPERATURE: 97.7 F

## 2023-06-19 DIAGNOSIS — D50.9 IRON DEFICIENCY ANEMIA, UNSPECIFIED IRON DEFICIENCY ANEMIA TYPE: ICD-10-CM

## 2023-06-19 DIAGNOSIS — K91.2 POSTSURGICAL MALABSORPTION: Primary | ICD-10-CM

## 2023-06-19 PROCEDURE — 96365 THER/PROPH/DIAG IV INF INIT: CPT

## 2023-06-19 RX ORDER — SODIUM CHLORIDE 9 MG/ML
20 INJECTION, SOLUTION INTRAVENOUS ONCE
Status: COMPLETED | OUTPATIENT
Start: 2023-06-19 | End: 2023-06-19

## 2023-06-19 RX ORDER — SODIUM CHLORIDE 9 MG/ML
20 INJECTION, SOLUTION INTRAVENOUS ONCE
Status: CANCELLED | OUTPATIENT
Start: 2023-06-19

## 2023-06-19 RX ADMIN — SODIUM CHLORIDE 20 ML/HR: 0.9 INJECTION, SOLUTION INTRAVENOUS at 13:41

## 2023-06-19 RX ADMIN — SODIUM CHLORIDE 300 MG: 0.9 INJECTION, SOLUTION INTRAVENOUS at 13:45

## 2023-06-19 NOTE — PROGRESS NOTES
Pt tolerated final venofer infusion without difficulty  No s/s reaction noted  Aware of office follow up and to have labs done prior  AVS declined  Left ambulatory in stable condition

## 2023-07-26 ENCOUNTER — TELEPHONE (OUTPATIENT)
Dept: HEMATOLOGY ONCOLOGY | Facility: CLINIC | Age: 33
End: 2023-07-26

## 2023-07-27 ENCOUNTER — APPOINTMENT (OUTPATIENT)
Dept: LAB | Facility: CLINIC | Age: 33
End: 2023-07-27
Payer: COMMERCIAL

## 2023-07-27 DIAGNOSIS — D50.8 IRON DEFICIENCY ANEMIA SECONDARY TO INADEQUATE DIETARY IRON INTAKE: ICD-10-CM

## 2023-07-27 DIAGNOSIS — E63.9 NUTRITIONAL DEFICIENCY: ICD-10-CM

## 2023-07-27 DIAGNOSIS — K91.2 POSTSURGICAL MALABSORPTION: ICD-10-CM

## 2023-07-27 DIAGNOSIS — D50.8 OTHER IRON DEFICIENCY ANEMIA: ICD-10-CM

## 2023-07-27 LAB
BASOPHILS # BLD AUTO: 0.04 THOUSANDS/ÂΜL (ref 0–0.1)
BASOPHILS NFR BLD AUTO: 1 % (ref 0–1)
EOSINOPHIL # BLD AUTO: 0.15 THOUSAND/ÂΜL (ref 0–0.61)
EOSINOPHIL NFR BLD AUTO: 3 % (ref 0–6)
ERYTHROCYTE [DISTWIDTH] IN BLOOD BY AUTOMATED COUNT: 14.6 % (ref 11.6–15.1)
FERRITIN SERPL-MCNC: 37 NG/ML (ref 11–307)
FOLATE SERPL-MCNC: 9.9 NG/ML
HCT VFR BLD AUTO: 39.7 % (ref 34.8–46.1)
HGB BLD-MCNC: 12.8 G/DL (ref 11.5–15.4)
IMM GRANULOCYTES # BLD AUTO: 0 THOUSAND/UL (ref 0–0.2)
IMM GRANULOCYTES NFR BLD AUTO: 0 % (ref 0–2)
IRON SATN MFR SERPL: 23 % (ref 15–50)
IRON SERPL-MCNC: 68 UG/DL (ref 50–170)
LYMPHOCYTES # BLD AUTO: 1.91 THOUSANDS/ÂΜL (ref 0.6–4.47)
LYMPHOCYTES NFR BLD AUTO: 39 % (ref 14–44)
MCH RBC QN AUTO: 28.9 PG (ref 26.8–34.3)
MCHC RBC AUTO-ENTMCNC: 32.2 G/DL (ref 31.4–37.4)
MCV RBC AUTO: 90 FL (ref 82–98)
MONOCYTES # BLD AUTO: 0.46 THOUSAND/ÂΜL (ref 0.17–1.22)
MONOCYTES NFR BLD AUTO: 9 % (ref 4–12)
NEUTROPHILS # BLD AUTO: 2.32 THOUSANDS/ÂΜL (ref 1.85–7.62)
NEUTS SEG NFR BLD AUTO: 48 % (ref 43–75)
NRBC BLD AUTO-RTO: 0 /100 WBCS
PLATELET # BLD AUTO: 273 THOUSANDS/UL (ref 149–390)
PMV BLD AUTO: 10.9 FL (ref 8.9–12.7)
RBC # BLD AUTO: 4.43 MILLION/UL (ref 3.81–5.12)
TIBC SERPL-MCNC: 294 UG/DL (ref 250–450)
VIT B12 SERPL-MCNC: 274 PG/ML (ref 180–914)
WBC # BLD AUTO: 4.88 THOUSAND/UL (ref 4.31–10.16)

## 2023-07-27 PROCEDURE — 83540 ASSAY OF IRON: CPT

## 2023-07-27 PROCEDURE — 82746 ASSAY OF FOLIC ACID SERUM: CPT

## 2023-07-27 PROCEDURE — 82728 ASSAY OF FERRITIN: CPT

## 2023-07-27 PROCEDURE — 36415 COLL VENOUS BLD VENIPUNCTURE: CPT

## 2023-07-27 PROCEDURE — 82607 VITAMIN B-12: CPT

## 2023-07-27 PROCEDURE — 85025 COMPLETE CBC W/AUTO DIFF WBC: CPT

## 2023-07-27 PROCEDURE — 83550 IRON BINDING TEST: CPT

## 2023-08-02 ENCOUNTER — OFFICE VISIT (OUTPATIENT)
Dept: HEMATOLOGY ONCOLOGY | Facility: CLINIC | Age: 33
End: 2023-08-02
Payer: COMMERCIAL

## 2023-08-02 ENCOUNTER — TELEPHONE (OUTPATIENT)
Dept: SURGICAL ONCOLOGY | Facility: CLINIC | Age: 33
End: 2023-08-02

## 2023-08-02 VITALS
DIASTOLIC BLOOD PRESSURE: 74 MMHG | HEIGHT: 62 IN | TEMPERATURE: 98 F | WEIGHT: 170.5 LBS | HEART RATE: 75 BPM | BODY MASS INDEX: 31.38 KG/M2 | SYSTOLIC BLOOD PRESSURE: 102 MMHG | OXYGEN SATURATION: 99 %

## 2023-08-02 DIAGNOSIS — K91.2 POSTSURGICAL MALABSORPTION: Primary | ICD-10-CM

## 2023-08-02 DIAGNOSIS — D50.9 IRON DEFICIENCY ANEMIA, UNSPECIFIED IRON DEFICIENCY ANEMIA TYPE: ICD-10-CM

## 2023-08-02 PROCEDURE — 99214 OFFICE O/P EST MOD 30 MIN: CPT

## 2023-08-02 RX ORDER — LANOLIN ALCOHOL/MO/W.PET/CERES
400 CREAM (GRAM) TOPICAL DAILY
Qty: 90 TABLET | Refills: 0 | Status: SHIPPED | OUTPATIENT
Start: 2023-08-02

## 2023-08-02 RX ORDER — SODIUM CHLORIDE 9 MG/ML
20 INJECTION, SOLUTION INTRAVENOUS ONCE
OUTPATIENT
Start: 2023-08-14

## 2023-08-02 NOTE — PROGRESS NOTES
HEMATOLOGY / [facility] FOLLOW UP NOTE    Primary Care Provider: Don Delarosa MD  Referring Provider:    MRN: [id number]  : 1990    Reason for Encounter: Iron deficiency anemia       Interval History: Patient presents for follow-up for her iron deficiency anemia status post 4 weekly Venofer infusions, last one in . Patient states she is feeling great. She is not as fatigued anymore as she used to be when I first saw her. Denies dizziness, lightheadedness, headaches, pica, RLS. She is in talks with a physician in [place] to get a tummy tuck and breast lift. Patient does have a history of gastric bypass surgery,  Aruna-en-Y. Patient continues to have heavy menstrual cycles. Labs: Hemoglobin 12.8, MCV 90, ferritin 37, serum iron 68, iron saturation 23%, vitamin B12 274, folate 9.9. REVIEW OF SYSTEMS:  Please note that a 14-point review of systems was performed to include Constitutional, HEENT, Respiratory, CVS, GI, , Musculoskeletal, Integumentary, Neurologic, Rheumatologic, Endocrinologic, Psychiatric, Lymphatic, and Hematologic/Oncologic systems were reviewed and are negative unless otherwise stated in HPI. Positive and negative findings pertinent to this evaluation are incorporated into the history of present illness.       ECOG PS: 0    PROBLEM LIST:  Patient Active Problem List   Diagnosis   • Eczema, dyshidrotic   • Acne   • Carpal tunnel syndrome   • Fracture, tooth   • Irregular menstrual cycle   • Overweight   • PCOS (polycystic ovarian syndrome)   • Vitamin D deficiency   • BMI 40.0-44.9, adult ([facility])   • Elevated glucose tolerance test   • Class 1 obesity due to excess calories without serious comorbidity with body mass index (BMI) of 31.0 to 31.9 in adult   • Asthma   • Excess skin of abdominal wall   • Pre-op examination   • Abnormal wellness exam   • Absolute anemia   • Postsurgical malabsorption   • Iron deficiency anemia, unspecified       Assessment / Plan: We discussed although her ferritin is within the normal range, it is on the lower side of normal, and due to her gastric bypass she will not absorb oral iron supplements. Therefore, I would recommend maintenance IV iron plan where she would get Venofer 300 mg every 56 days. Recommend patient's restart oral B12 drops that she has been taking as well as a folic acid supplement, which I will send to her pharmacy. Advise she take the folic acid for 90 days. Patient agreeable with plan. We will see her back in the office in 6 months with labs prior (CBC, iron panel, vitamin B12, folate). Patient is aware to contact us for any additional questions or concerns. I spent 30 minutes on chart review, face to face counseling time, coordination of care and documentation. Past Medical History:   has a past medical history of Asthma, Female infertility, History of COVID-19, Obesity, morbid, BMI 40.0-49.9 ([address]), Polycystic ovary syndrome, and Varicella. PAST SURGICAL HISTORY:   has a past surgical history that includes  section; EGD; and pr laps gstr rstcv px w/byp hali-en-y limb <150 cm (N/A, 2021). CURRENT MEDICATIONS  Current Outpatient Medications   Medication Sig Dispense Refill   • ferrous sulfate 325 (65 Fe) mg tablet Take 325 mg by mouth daily with breakfast     • folic acid (FOLVITE) 334 mcg tablet Take 1 tablet (400 mcg total) by mouth daily 90 tablet 0   • Multiple Vitamin (multivitamin) tablet Take 1 tablet by mouth daily     • Advair Diskus 100-50 MCG/DOSE inhaler INHALE 1 PUFF 2 (TWO) TIMES A DAY RINSE MOUTH AFTER USE (Patient not taking: Reported on 3/24/2022) 60 blister 0   • hydrocortisone 1 % cream APPLY 1 APPLICATION TOPICALLY 4 (FOUR) TIMES A DAY AS NEEDED (APPLY TO PERINEUM) (Patient not taking: Reported on 3/17/2023) 28.35 g 5   • norelgestromin-ethinyl estradiol (ORTHO EVRA) 150-35 MCG/24HR Apply patch weekly for 3 weeks, then remove and leave off for 1 week.  (Patient not taking: Reported on 12/9/2022) 9 patch 4     No current facility-administered medications for this visit. [unfilled]    SOCIAL HISTORY:   reports that she has never smoked. She has never used smokeless tobacco. She reports that she does not drink alcohol and does not use drugs. FAMILY HISTORY:  family history includes Asthma in her son; Breast cancer in her family and maternal grandfather; Diabetes in her maternal grandfather and maternal grandmother; Fibroids in her mother; HIV in her mother; Heart disease in her mother; Kidney disease in her mother; Lung cancer in her family and maternal grandmother; No Known Problems in her father; Prostate cancer in her maternal grandfather. ALLERGIES:  has No Known Allergies. Physical Exam:  Vital Signs:   Visit Vitals  /74 (BP Location: Right arm, Patient Position: Sitting, Cuff Size: Large)   Pulse 75   Temp 98 °F (36.7 °C) (Temporal)   Ht 5' 1.5" (1.562 m)   Wt 77.3 kg (170 lb 8 oz)   SpO2 99%   BMI 31.69 kg/m²   OB Status Birth Control   Smoking Status Never   BSA 1.78 m²     Body mass index is 31.69 kg/m². Body surface area is 1.78 meters squared. GEN: Alert, awake oriented x3, in no acute distress  HEENT- No pallor, icterus, cyanosis, no oral mucosal lesions,   LAD - no palpable cervical, clavicle, axillary, inguinal LAD  Heart- normal S1 S2, regular rate and rhythm, No murmur, rubs.    Lungs- clear breathing sound bilateral.   Abdomen- soft, Non tender, bowel sounds present  Extremities- No cyanosis, clubbing, edema  Neuro- No focal neurological deficit    Labs:  Lab Results   Component Value Date    WBC 4.88 07/27/2023    HGB 12.8 07/27/2023    HCT 39.7 07/27/2023    MCV 90 07/27/2023     07/27/2023     Lab Results   Component Value Date    SODIUM 138 03/17/2023    K 3.8 03/17/2023     03/17/2023    CO2 28 03/17/2023    AGAP 2 (L) 03/17/2023    BUN 13 03/17/2023    CREATININE 0.66 03/17/2023    GLUC 84 12/06/2022    GLUF 84 03/17/2023    CALCIUM 8.6 03/17/2023    AST 12 03/17/2023    ALT 19 03/17/2023    ALKPHOS 63 03/17/2023    TP 6.8 03/17/2023    TBILI 0.34 03/17/2023    EGFR 115 03/17/2023

## 2023-08-03 NOTE — TELEPHONE ENCOUNTER
What would be a preferred day of the week that would work best for your infusion appointment? Monday through Thursday after 2pm, Friday anytime. Are there any days or dates that do not work for your schedule, including any upcoming vacations? 9/6 through 9/10  We are going to try our best to schedule you at the infusion center closest to your home. In the event that we are unable to what would be your next preferred infusion site or sites? [address]    Do you have transportation to take you to all of your appointments?  yes

## 2023-08-12 ENCOUNTER — OFFICE VISIT (OUTPATIENT)
Dept: LAB | Facility: CLINIC | Age: 33
End: 2023-08-12
Payer: COMMERCIAL

## 2023-08-12 ENCOUNTER — HOSPITAL ENCOUNTER (OUTPATIENT)
Dept: RADIOLOGY | Facility: HOSPITAL | Age: 33
Discharge: HOME | End: 2023-08-12
Payer: COMMERCIAL

## 2023-08-12 DIAGNOSIS — Z01.818 OTHER SPECIFIED PRE-OPERATIVE EXAMINATION: ICD-10-CM

## 2023-08-12 LAB
ATRIAL RATE: 70 BPM
P AXIS: 10 DEGREES
PR INTERVAL: 152 MS
QRS AXIS: 36 DEGREES
QRSD INTERVAL: 86 MS
QT INTERVAL: 408 MS
QTC INTERVAL: 440 MS
T WAVE AXIS: 23 DEGREES
VENTRICULAR RATE: 70 BPM

## 2023-08-12 PROCEDURE — 93005 ELECTROCARDIOGRAM TRACING: CPT

## 2023-08-12 PROCEDURE — 71046 X-RAY EXAM CHEST 2 VIEWS: CPT

## 2023-08-12 PROCEDURE — 93010 ELECTROCARDIOGRAM REPORT: CPT | Performed by: INTERNAL MEDICINE

## 2023-08-14 ENCOUNTER — HOSPITAL ENCOUNTER (OUTPATIENT)
Dept: RADIOLOGY | Facility: HOSPITAL | Age: 33
Discharge: HOME | End: 2023-08-14
Payer: COMMERCIAL

## 2023-08-14 DIAGNOSIS — K42.9 UMBILICAL HERNIA WITHOUT OBSTRUCTION OR GANGRENE: ICD-10-CM

## 2023-08-14 PROCEDURE — 76705 ECHO EXAM OF ABDOMEN: CPT

## 2023-08-15 DIAGNOSIS — K91.2 POSTSURGICAL MALABSORPTION: Primary | ICD-10-CM

## 2023-08-15 DIAGNOSIS — D50.9 IRON DEFICIENCY ANEMIA, UNSPECIFIED IRON DEFICIENCY ANEMIA TYPE: ICD-10-CM

## 2023-08-15 LAB
ATRIAL RATE: 74 BPM
P AXIS: 23 DEGREES
PR INTERVAL: 160 MS
QRS AXIS: 32 DEGREES
QRSD INTERVAL: 86 MS
QT INTERVAL: 396 MS
QTC INTERVAL: 439 MS
T WAVE AXIS: 34 DEGREES
VENTRICULAR RATE: 74 BPM

## 2023-08-15 PROCEDURE — 93010 ELECTROCARDIOGRAM REPORT: CPT | Performed by: INTERNAL MEDICINE

## 2023-08-15 RX ORDER — SODIUM CHLORIDE 9 MG/ML
20 INJECTION, SOLUTION INTRAVENOUS ONCE
Status: CANCELLED | OUTPATIENT
Start: 2023-08-17

## 2023-08-17 ENCOUNTER — HOSPITAL ENCOUNTER (OUTPATIENT)
Dept: INFUSION CENTER | Facility: HOSPITAL | Age: 33
End: 2023-08-17
Attending: INTERNAL MEDICINE
Payer: COMMERCIAL

## 2023-08-17 VITALS
DIASTOLIC BLOOD PRESSURE: 74 MMHG | HEART RATE: 83 BPM | OXYGEN SATURATION: 100 % | TEMPERATURE: 98.3 F | RESPIRATION RATE: 18 BRPM | SYSTOLIC BLOOD PRESSURE: 112 MMHG

## 2023-08-17 DIAGNOSIS — K91.2 POSTSURGICAL MALABSORPTION: Primary | ICD-10-CM

## 2023-08-17 DIAGNOSIS — D50.9 IRON DEFICIENCY ANEMIA, UNSPECIFIED IRON DEFICIENCY ANEMIA TYPE: ICD-10-CM

## 2023-08-17 PROCEDURE — 96365 THER/PROPH/DIAG IV INF INIT: CPT

## 2023-08-17 RX ORDER — SODIUM CHLORIDE 9 MG/ML
20 INJECTION, SOLUTION INTRAVENOUS ONCE
OUTPATIENT
Start: 2023-10-12

## 2023-08-17 RX ORDER — SODIUM CHLORIDE 9 MG/ML
20 INJECTION, SOLUTION INTRAVENOUS ONCE
Status: COMPLETED | OUTPATIENT
Start: 2023-08-17 | End: 2023-08-17

## 2023-08-17 RX ADMIN — IRON SUCROSE 300 MG: 20 INJECTION, SOLUTION INTRAVENOUS at 14:23

## 2023-08-17 RX ADMIN — SODIUM CHLORIDE 20 ML/HR: 0.9 INJECTION, SOLUTION INTRAVENOUS at 14:23

## 2023-08-17 NOTE — PROGRESS NOTES
Patient on maintenance venofer 300mg now every 8 weeks, tolerated well today without complications.  confirmed next appt

## 2023-08-21 ENCOUNTER — APPOINTMENT (OUTPATIENT)
Dept: LAB | Facility: CLINIC | Age: 33
End: 2023-08-21
Payer: COMMERCIAL

## 2023-08-21 DIAGNOSIS — Z01.818 OTHER SPECIFIED PRE-OPERATIVE EXAMINATION: ICD-10-CM

## 2023-08-21 LAB
ALBUMIN SERPL BCP-MCNC: 4.1 G/DL (ref 3.5–5)
ALP SERPL-CCNC: 61 U/L (ref 46–116)
ALT SERPL W P-5'-P-CCNC: 24 U/L (ref 12–78)
ANION GAP SERPL CALCULATED.3IONS-SCNC: 5 MMOL/L
APTT PPP: 35 SECONDS (ref 23–37)
AST SERPL W P-5'-P-CCNC: 13 U/L (ref 5–45)
BILIRUB SERPL-MCNC: 0.37 MG/DL (ref 0.2–1)
BUN SERPL-MCNC: 13 MG/DL (ref 5–25)
CALCIUM SERPL-MCNC: 9.2 MG/DL (ref 8.3–10.1)
CHLORIDE SERPL-SCNC: 108 MMOL/L (ref 96–108)
CO2 SERPL-SCNC: 26 MMOL/L (ref 21–32)
CREAT SERPL-MCNC: 0.76 MG/DL (ref 0.6–1.3)
ERYTHROCYTE [DISTWIDTH] IN BLOOD BY AUTOMATED COUNT: 13.6 % (ref 11.6–15.1)
EST. AVERAGE GLUCOSE BLD GHB EST-MCNC: 97 MG/DL
GFR SERPL CREATININE-BSD FRML MDRD: 101 ML/MIN/1.73SQ M
GLUCOSE SERPL-MCNC: 86 MG/DL (ref 65–140)
HBA1C MFR BLD: 5 %
HCG SERPL QL: NEGATIVE
HCT VFR BLD AUTO: 37.5 % (ref 34.8–46.1)
HCV AB SER QL: NORMAL
HGB BLD-MCNC: 12.3 G/DL (ref 11.5–15.4)
INR PPP: 0.98 (ref 0.84–1.19)
MCH RBC QN AUTO: 29.8 PG (ref 26.8–34.3)
MCHC RBC AUTO-ENTMCNC: 32.8 G/DL (ref 31.4–37.4)
MCV RBC AUTO: 91 FL (ref 82–98)
PLATELET # BLD AUTO: 269 THOUSANDS/UL (ref 149–390)
PMV BLD AUTO: 10.9 FL (ref 8.9–12.7)
POTASSIUM SERPL-SCNC: 3.9 MMOL/L (ref 3.5–5.3)
PROT SERPL-MCNC: 6.9 G/DL (ref 6.4–8.4)
PROTHROMBIN TIME: 13.2 SECONDS (ref 11.6–14.5)
RBC # BLD AUTO: 4.13 MILLION/UL (ref 3.81–5.12)
SODIUM SERPL-SCNC: 139 MMOL/L (ref 135–147)
TSH SERPL DL<=0.05 MIU/L-ACNC: 1.5 UIU/ML (ref 0.45–4.5)
WBC # BLD AUTO: 4.89 THOUSAND/UL (ref 4.31–10.16)

## 2023-08-21 PROCEDURE — 84443 ASSAY THYROID STIM HORMONE: CPT

## 2023-08-21 PROCEDURE — 85730 THROMBOPLASTIN TIME PARTIAL: CPT

## 2023-08-21 PROCEDURE — 85027 COMPLETE CBC AUTOMATED: CPT

## 2023-08-21 PROCEDURE — 85610 PROTHROMBIN TIME: CPT

## 2023-08-21 PROCEDURE — 86803 HEPATITIS C AB TEST: CPT

## 2023-08-21 PROCEDURE — 87389 HIV-1 AG W/HIV-1&-2 AB AG IA: CPT

## 2023-08-21 PROCEDURE — 84703 CHORIONIC GONADOTROPIN ASSAY: CPT

## 2023-08-21 PROCEDURE — 80053 COMPREHEN METABOLIC PANEL: CPT

## 2023-08-21 PROCEDURE — 36415 COLL VENOUS BLD VENIPUNCTURE: CPT

## 2023-08-21 PROCEDURE — 83036 HEMOGLOBIN GLYCOSYLATED A1C: CPT

## 2023-08-22 LAB
HIV 1+2 AB+HIV1 P24 AG SERPL QL IA: NORMAL
HIV 2 AB SERPL QL IA: NORMAL
HIV1 AB SERPL QL IA: NORMAL
HIV1 P24 AG SERPL QL IA: NORMAL

## 2023-08-23 ENCOUNTER — OFFICE VISIT (OUTPATIENT)
Dept: FAMILY MEDICINE CLINIC | Facility: CLINIC | Age: 33
End: 2023-08-23
Payer: COMMERCIAL

## 2023-08-23 VITALS
HEIGHT: 62 IN | BODY MASS INDEX: 31.21 KG/M2 | DIASTOLIC BLOOD PRESSURE: 74 MMHG | HEART RATE: 81 BPM | RESPIRATION RATE: 16 BRPM | TEMPERATURE: 98.1 F | SYSTOLIC BLOOD PRESSURE: 116 MMHG | WEIGHT: 169.6 LBS | OXYGEN SATURATION: 98 %

## 2023-08-23 DIAGNOSIS — Z01.818 PRE-OP EXAMINATION: ICD-10-CM

## 2023-08-23 DIAGNOSIS — N30.00 ACUTE CYSTITIS WITHOUT HEMATURIA: Primary | ICD-10-CM

## 2023-08-23 DIAGNOSIS — L98.7 EXCESS SKIN OF ABDOMINAL WALL: ICD-10-CM

## 2023-08-23 PROCEDURE — 99214 OFFICE O/P EST MOD 30 MIN: CPT | Performed by: FAMILY MEDICINE

## 2023-08-23 RX ORDER — CEPHALEXIN 500 MG/1
500 CAPSULE ORAL 3 TIMES DAILY
Qty: 15 CAPSULE | Refills: 0 | Status: SHIPPED | OUTPATIENT
Start: 2023-08-23 | End: 2023-08-28

## 2023-08-23 NOTE — PROGRESS NOTES
Name: Claudia Siddiqui      : 1990      MRN: [id number]  Encounter Provider: Charisse Holter, MD  Encounter Date: 2023   Encounter department: [facility]     1. Acute cystitis without hematuria  -     cephalexin (KEFLEX) 500 mg capsule; Take 1 capsule (500 mg total) by mouth 3 (three) times a day for 5 days  -     UA w Reflex to Microscopic w Reflex to Culture    2. Pre-op examination  Assessment & Plan:  Her blood work and EKG are acceptable. Her UA came back showing possible UTI. I will start her on Keflex 500 mg 3 times a day for 5 days. I am going to wait for the results of her culture. I will consider repeating her UA and C&S in 5 to 7 days to according to her result. If culture come back negative she is an acceptable risk for the proposed procedure. 3. Excess skin of abdominal wall  Assessment & Plan:  She is going for excess skin removal on . Subjective     She is here today for preop clearance for excess skin removal to be done in [place]. She had low blood count and she has been having iron infusion in her hemoglobin and iron level improved. All the rest of blood work came back stable. Her EKG came back normal sinus rhythm. Her UA showed positive nitrates and leukocyte. Culture is not back yet. She is not having any urinary symptoms. Review of Systems   Constitutional: Negative for chills and fever. HENT: Negative for trouble swallowing. Eyes: Negative for visual disturbance. Respiratory: Negative for cough and shortness of breath. Cardiovascular: Negative for chest pain, palpitations and leg swelling. Gastrointestinal: Negative for abdominal pain, constipation and diarrhea. Endocrine: Negative for cold intolerance and heat intolerance. Genitourinary: Negative for difficulty urinating and dysuria. Musculoskeletal: Negative for gait problem. Skin: Negative for rash. Neurological: Negative for dizziness, tremors, seizures and headaches. Hematological: Negative for adenopathy. Psychiatric/Behavioral: Negative for behavioral problems. Past Medical History:   Diagnosis Date   • Asthma    • Female infertility     PCOS related   • History of COVID-19     2021- not hospitalized   • Obesity, morbid, BMI 40.0-49.9 ([address])    • Polycystic ovary syndrome    • Varicella      Past Surgical History:   Procedure Laterality Date   •  SECTION      x1   • EGD     • NV LAPS GSTR RSTCV PX W/BYP MONIQUE-EN-Y LIMB <150 CM N/A 2021    Procedure: BYPASS GASTRIC MONIQUE-EN-Y LAPAROSCOPIC W ROBOTICS W/ INTRAOP EGD;  Surgeon: Steve Abbott MD;  Location: [facility];  Service: Bariatrics     Family History   Problem Relation Age of Onset   • Breast cancer Family    • Lung cancer Family    • Fibroids Mother    • Heart disease Mother    • HIV Mother    • Kidney disease Mother    • Asthma Son    • Lung cancer Maternal Grandmother    • Diabetes Maternal Grandmother    • Prostate cancer Maternal Grandfather    • Breast cancer Maternal Grandfather    • Diabetes Maternal Grandfather    • No Known Problems Father      Social History     Socioeconomic History   • Marital status: /Civil Union     Spouse name: None   • Number of children: None   • Years of education: None   • Highest education level: None   Occupational History   • None   Tobacco Use   • Smoking status: Never   • Smokeless tobacco: Never   • Tobacco comments:     no passive smoke exposure   Vaping Use   • Vaping Use: Never used   Substance and Sexual Activity   • Alcohol use: No   • Drug use: No   • Sexual activity: Yes     Partners: Male     Birth control/protection: I.U.D.    Other Topics Concern   • None   Social History Narrative   • None     Social Determinants of Health     Financial Resource Strain: Not on file   Food Insecurity: Not on file   Transportation Needs: Not on file   Physical Activity: Not on file Stress: Not on file   Social Connections: Not on file   Intimate Partner Violence: Not on file   Housing Stability: Not on file     Current Outpatient Medications on File Prior to Visit   Medication Sig   • ferrous sulfate 325 (65 Fe) mg tablet Take 325 mg by mouth daily with breakfast   • folic acid (FOLVITE) 604 mcg tablet Take 1 tablet (400 mcg total) by mouth daily   • Multiple Vitamin (multivitamin) tablet Take 1 tablet by mouth daily   • Advair Diskus 100-50 MCG/DOSE inhaler INHALE 1 PUFF 2 (TWO) TIMES A DAY RINSE MOUTH AFTER USE (Patient not taking: Reported on 3/24/2022)   • hydrocortisone 1 % cream APPLY 1 APPLICATION TOPICALLY 4 (FOUR) TIMES A DAY AS NEEDED (APPLY TO PERINEUM) (Patient not taking: Reported on 3/17/2023)   • norelgestromin-ethinyl estradiol (ORTHO EVRA) 150-35 MCG/24HR Apply patch weekly for 3 weeks, then remove and leave off for 1 week. (Patient not taking: Reported on 12/9/2022)     No Known Allergies  Immunization History   Administered Date(s) Administered   • Influenza Quadrivalent, 6-35 Months IM 01/05/2015   • Tdap 06/02/2020       Objective     /74 (BP Location: Left arm, Patient Position: Sitting, Cuff Size: Standard)   Pulse 81   Temp 98.1 °F (36.7 °C) (Tympanic)   Resp 16   Ht 5' 1.5" (1.562 m)   Wt 76.9 kg (169 lb 9.6 oz)   SpO2 98%   BMI 31.53 kg/m²     Physical Exam  Vitals and nursing note reviewed. Constitutional:       Appearance: She is well-developed. HENT:      Head: Normocephalic and atraumatic. Eyes:      Pupils: Pupils are equal, round, and reactive to light. Cardiovascular:      Rate and Rhythm: Normal rate and regular rhythm. Heart sounds: Normal heart sounds. Pulmonary:      Effort: Pulmonary effort is normal.      Breath sounds: Normal breath sounds. Abdominal:      General: Bowel sounds are normal.      Palpations: Abdomen is soft. Musculoskeletal:         General: Normal range of motion.       Cervical back: Normal range of motion and neck supple. Lymphadenopathy:      Cervical: No cervical adenopathy. Skin:     General: Skin is warm. Neurological:      Mental Status: She is alert and oriented to person, place, and time. Cranial Nerves: No cranial nerve deficit.        Niecy Lopez MD

## 2023-08-23 NOTE — ASSESSMENT & PLAN NOTE
Her blood work and EKG are acceptable. Her UA came back showing possible UTI. I will start her on Keflex 500 mg 3 times a day for 5 days. I am going to wait for the results of her culture. I will consider repeating her UA and C&S in 5 to 7 days to according to her result. If culture come back negative she is an acceptable risk for the proposed procedure.

## 2023-08-29 ENCOUNTER — APPOINTMENT (OUTPATIENT)
Dept: LAB | Facility: CLINIC | Age: 33
End: 2023-08-29
Payer: COMMERCIAL

## 2023-08-29 LAB
BACTERIA UR QL AUTO: ABNORMAL /HPF
BILIRUB UR QL STRIP: NEGATIVE
CLARITY UR: CLEAR
COLOR UR: ABNORMAL
GLUCOSE UR STRIP-MCNC: NEGATIVE MG/DL
HGB UR QL STRIP.AUTO: NEGATIVE
KETONES UR STRIP-MCNC: NEGATIVE MG/DL
LEUKOCYTE ESTERASE UR QL STRIP: NEGATIVE
MUCOUS THREADS UR QL AUTO: ABNORMAL
NITRITE UR QL STRIP: NEGATIVE
NON-SQ EPI CELLS URNS QL MICRO: ABNORMAL /HPF
PH UR STRIP.AUTO: 6.5 [PH]
PROT UR STRIP-MCNC: ABNORMAL MG/DL
RBC #/AREA URNS AUTO: ABNORMAL /HPF
SP GR UR STRIP.AUTO: 1.03 (ref 1–1.03)
UROBILINOGEN UR STRIP-ACNC: <2 MG/DL
WBC #/AREA URNS AUTO: ABNORMAL /HPF

## 2023-08-29 PROCEDURE — 81001 URINALYSIS AUTO W/SCOPE: CPT | Performed by: FAMILY MEDICINE

## 2023-09-01 ENCOUNTER — TELEPHONE (OUTPATIENT)
Dept: FAMILY MEDICINE CLINIC | Facility: CLINIC | Age: 33
End: 2023-09-01

## 2023-09-01 NOTE — TELEPHONE ENCOUNTER
Pt had a UTI when we did her urine sample for medical clearance and given antibiotics and redid urine test which she received the results and it has cleared up. She has given us paperwork to fill out once that was cleared up and her medical clearance can be filled out and sent. Please review and call pt to  paperwork.

## 2023-09-05 PROBLEM — N30.00 ACUTE CYSTITIS WITHOUT HEMATURIA: Status: ACTIVE | Noted: 2023-09-05

## 2023-09-05 NOTE — TELEPHONE ENCOUNTER
Dr. Geri Barron completed paperwork   I left message stating paperwork finished and ready for .    I made copy to be scanned into chart

## 2023-09-17 ENCOUNTER — HOSPITAL ENCOUNTER (OUTPATIENT)
Dept: RADIOLOGY | Facility: HOSPITAL | Age: 33
Discharge: HOME | End: 2023-09-17
Payer: COMMERCIAL

## 2023-09-17 DIAGNOSIS — K42.9 UMBILICAL HERNIA WITHOUT OBSTRUCTION OR GANGRENE: ICD-10-CM

## 2023-09-17 PROCEDURE — 74150 CT ABDOMEN W/O CONTRAST: CPT

## 2023-10-12 ENCOUNTER — HOSPITAL ENCOUNTER (OUTPATIENT)
Dept: INFUSION CENTER | Facility: HOSPITAL | Age: 33
End: 2023-10-12
Attending: INTERNAL MEDICINE
Payer: COMMERCIAL

## 2023-10-12 VITALS
RESPIRATION RATE: 18 BRPM | TEMPERATURE: 97.9 F | OXYGEN SATURATION: 98 % | HEART RATE: 88 BPM | SYSTOLIC BLOOD PRESSURE: 118 MMHG | DIASTOLIC BLOOD PRESSURE: 63 MMHG

## 2023-10-12 DIAGNOSIS — K91.2 POSTSURGICAL MALABSORPTION: ICD-10-CM

## 2023-10-12 DIAGNOSIS — D50.9 IRON DEFICIENCY ANEMIA, UNSPECIFIED IRON DEFICIENCY ANEMIA TYPE: Primary | ICD-10-CM

## 2023-10-12 RX ORDER — SODIUM CHLORIDE 9 MG/ML
20 INJECTION, SOLUTION INTRAVENOUS ONCE
Status: COMPLETED | OUTPATIENT
Start: 2023-10-12 | End: 2023-10-12

## 2023-10-12 RX ORDER — SODIUM CHLORIDE 9 MG/ML
20 INJECTION, SOLUTION INTRAVENOUS ONCE
OUTPATIENT
Start: 2023-12-07

## 2023-10-12 RX ADMIN — SODIUM CHLORIDE 20 ML/HR: 0.9 INJECTION, SOLUTION INTRAVENOUS at 14:25

## 2023-10-12 RX ADMIN — IRON SUCROSE 300 MG: 20 INJECTION, SOLUTION INTRAVENOUS at 14:29

## 2023-10-12 NOTE — PROGRESS NOTES
Patient tolerated venofer infusion without incident. Next appt confirmed, AVS declined. Patient left ambulatory.

## 2023-10-24 ENCOUNTER — HOSPITAL ENCOUNTER (EMERGENCY)
Facility: HOSPITAL | Age: 33
Discharge: HOME | End: 2023-10-24
Attending: EMERGENCY MEDICINE | Admitting: EMERGENCY MEDICINE
Payer: COMMERCIAL

## 2023-10-24 VITALS
OXYGEN SATURATION: 97 % | RESPIRATION RATE: 18 BRPM | DIASTOLIC BLOOD PRESSURE: 70 MMHG | TEMPERATURE: 98.8 F | SYSTOLIC BLOOD PRESSURE: 96 MMHG | HEART RATE: 106 BPM

## 2023-10-24 DIAGNOSIS — R68.89 FLU-LIKE SYMPTOMS: Primary | ICD-10-CM

## 2023-10-24 LAB
FLUAV RNA RESP QL NAA+PROBE: NEGATIVE
FLUBV RNA RESP QL NAA+PROBE: NEGATIVE
RSV RNA RESP QL NAA+PROBE: NEGATIVE
SARS-COV-2 RNA RESP QL NAA+PROBE: POSITIVE

## 2023-10-24 PROCEDURE — 99283 EMERGENCY DEPT VISIT LOW MDM: CPT | Performed by: EMERGENCY MEDICINE

## 2023-10-24 PROCEDURE — NC001 PR NO CHARGE: Performed by: EMERGENCY MEDICINE

## 2023-10-24 PROCEDURE — 99283 EMERGENCY DEPT VISIT LOW MDM: CPT

## 2023-10-24 PROCEDURE — 0241U HB NFCT DS VIR RESP RNA 4 TRGT: CPT | Performed by: EMERGENCY MEDICINE

## 2023-10-24 NOTE — Clinical Note
Satinder Palacio was seen and treated in our emergency department on 10/24/2023. Diagnosis:     Katy Arroyo  . She may return on this date: 10/27/2023         If you have any questions or concerns, please don't hesitate to call.       Gerardo Gibbs, DO    ______________________________           _______________          _______________  Hospital Representative                              Date                                Time

## 2023-10-25 NOTE — ED PROVIDER NOTES
History  Chief Complaint   Patient presents with    Flu Symptoms     Started this morning, c/o body aches, pt reports feeling hot but denies fevers, last took tylenol @ 3m     41-year-old female presenting to the emergency department for evaluation of flulike symptoms. Patient notes body aches and pains. Patient has positive pregnancy test over a month ago. He is scheduled to see OB/GYN on Thursday to establish intrauterine pregnancy. She denies any chest pain or shortness of breath denies any abdominal pain denies any diarrhea constipation dysuria or hematuria. Patient notes that she has had nausea without vomiting. Has been taking Tylenol for symptomatic relief        Prior to Admission Medications   Prescriptions Last Dose Informant Patient Reported? Taking? Advair Diskus 100-50 MCG/DOSE inhaler  Self No No   Sig: INHALE 1 PUFF 2 (TWO) TIMES A DAY RINSE MOUTH AFTER USE   Patient not taking: Reported on 3/24/2022   Multiple Vitamin (multivitamin) tablet  Self Yes No   Sig: Take 1 tablet by mouth daily   ferrous sulfate 325 (65 Fe) mg tablet  Self Yes No   Sig: Take 325 mg by mouth daily with breakfast   folic acid (FOLVITE) 306 mcg tablet   No No   Sig: Take 1 tablet (400 mcg total) by mouth daily   hydrocortisone 1 % cream  Self No No   Sig: APPLY 1 APPLICATION TOPICALLY 4 (FOUR) TIMES A DAY AS NEEDED (APPLY TO PERINEUM)   Patient not taking: Reported on 3/17/2023   norelgestromin-ethinyl estradiol (ORTHO EVRA) 150-35 MCG/24HR  Self No No   Sig: Apply patch weekly for 3 weeks, then remove and leave off for 1 week.    Patient not taking: Reported on 2022      Facility-Administered Medications: None       Past Medical History:   Diagnosis Date    Asthma     Female infertility     PCOS related    History of COVID-19     2021- not hospitalized    Obesity, morbid, BMI 40.0-49.9 ([address])     Polycystic ovary syndrome     Varicella        Past Surgical History:   Procedure Laterality Date     SECTION      x1    EGD      ND LAPS GSTR RSTCV PX W/BYP MONIQUE-EN-Y LIMB <150 CM N/A 9/7/2021    Procedure: BYPASS GASTRIC MONIQUE-EN-Y LAPAROSCOPIC W ROBOTICS W/ INTRAOP EGD;  Surgeon: Omi Petersen MD;  Location: [facility];  Service: Bariatrics       Family History   Problem Relation Age of Onset    Breast cancer Family     Lung cancer Family     Fibroids Mother     Heart disease Mother     HIV Mother     Kidney disease Mother     Asthma Son     Lung cancer Maternal Grandmother     Diabetes Maternal Grandmother     Prostate cancer Maternal Grandfather     Breast cancer Maternal Grandfather     Diabetes Maternal Grandfather     No Known Problems Father      I have reviewed and agree with the history as documented. E-Cigarette/Vaping    E-Cigarette Use Never User      E-Cigarette/Vaping Substances    Nicotine No     THC No     CBD No     Flavoring No      Social History     Tobacco Use    Smoking status: Never    Smokeless tobacco: Never    Tobacco comments:     no passive smoke exposure   Vaping Use    Vaping Use: Never used   Substance Use Topics    Alcohol use: No    Drug use: No        Review of Systems   Constitutional:  Positive for activity change and fatigue. Negative for chills and fever. HENT:  Negative for congestion, ear pain and sore throat. Eyes:  Negative for pain and visual disturbance. Respiratory:  Negative for cough, chest tightness and shortness of breath. Cardiovascular:  Negative for chest pain and palpitations. Gastrointestinal:  Negative for abdominal pain, constipation, diarrhea and vomiting. Genitourinary:  Negative for dysuria and hematuria. Musculoskeletal:  Negative for arthralgias and back pain. Skin:  Negative for color change and rash. Neurological:  Negative for dizziness, seizures, syncope, light-headedness and numbness. Psychiatric/Behavioral:  Negative for agitation and behavioral problems. All other systems reviewed and are negative.       Physical Exam  ED Triage Vitals [10/24/23 1924]   Temperature Pulse Respirations Blood Pressure SpO2   98.8 °F (37.1 °C) (!) 106 18 96/70 97 %      Temp Source Heart Rate Source Patient Position - Orthostatic VS BP Location FiO2 (%)   Oral Monitor Sitting Right arm --      Pain Score       --             Orthostatic Vital Signs  Vitals:    10/24/23 1924   BP: 96/70   Pulse: (!) 106   Patient Position - Orthostatic VS: Sitting       Physical Exam  Vitals and nursing note reviewed. Constitutional:       General: She is not in acute distress. Appearance: She is well-developed. HENT:      Head: Normocephalic and atraumatic. Right Ear: External ear normal.      Left Ear: External ear normal.      Nose: Nose normal.      Mouth/Throat:      Mouth: Mucous membranes are moist.   Eyes:      Conjunctiva/sclera: Conjunctivae normal.   Cardiovascular:      Rate and Rhythm: Normal rate and regular rhythm. Heart sounds: No murmur heard. Pulmonary:      Effort: Pulmonary effort is normal. No respiratory distress. Breath sounds: Normal breath sounds. Abdominal:      Palpations: Abdomen is soft. Tenderness: There is no abdominal tenderness. Musculoskeletal:         General: No swelling. Cervical back: Normal range of motion and neck supple. Skin:     General: Skin is warm and dry. Capillary Refill: Capillary refill takes less than 2 seconds. Neurological:      General: No focal deficit present. Mental Status: She is alert and oriented to person, place, and time. Psychiatric:         Mood and Affect: Mood normal.         Behavior: Behavior normal.         ED Medications  Medications - No data to display    Diagnostic Studies  Results Reviewed       Procedure Component Value Units Date/Time    FLU/RSV/COVID - if FLU/RSV clinically relevant [[id number]] Collected: 10/24/23 1956    Lab Status:  In process Specimen: Nares from Nose Updated: 10/24/23 2004                   No orders to display Procedures  POC Pelvic US    Date/Time: 10/24/2023 8:48 PM    Performed by: David Bains DO  Authorized by: David Bains DO    Patient location:  ED  Other Assisting Provider: Yes (comment)    Procedure details:     Exam Type:  Educational    Indications: evaluate for IUP    Interpretation:     Ultrasound impressions: indeterminate      Pregnancy findings: intrauterine pregnancy (IUP)          ED Course                                       Medical Decision Making  38-year-old pregnant female presenting to the emergency department for evaluation of flulike symptoms. Bedside ultrasound showed intrauterine pregnancy with fetal heartbeat. However patient is planned to see OB/GYN on Thursday to establish first-time ultrasound. We will treat symptomatically for viral syndrome Tylenol Motrin COVID flue RSV test sent. Patient is advised to continue over-the-counter medications for symptomatic relief and advised to follow-up with primary care as well as OB/GYN in the next several days for further evaluation patient is aware she has no questions or concerns she is advised to come back to the hospital she develops any new worsening or concerning symptoms patient stable for discharge. Disposition  Final diagnoses:   Flu-like symptoms     Time reflects when diagnosis was documented in both MDM as applicable and the Disposition within this note       Time User Action Codes Description Comment    10/24/2023  7:52 PM Wanda Xie Add [R68.89] Flu-like symptoms           ED Disposition       ED Disposition   Discharge    Condition   Stable    Date/Time   Tue Oct 24, 2023  7:52 PM    Comment   Tricia Aase discharge to home/self care.                    Follow-up Information       Follow up With Specialties Details Why Contact Info Additional Information    Tia Geronimo MD Family Medicine Schedule an appointment as soon as possible for a visit  for follow up [id number] [facility] [id number] [phone]       [facility] Emergency Department Emergency Medicine Go to  As needed, If symptoms worsen [address] [address]  [facility] Emergency Department, [address], [place], [place], [address] Obstetrics and Gynecology Schedule an appointment as soon as possible for a visit  for follow up Radha Jeffrey  [place]  [phone]               Patient's Medications   Discharge Prescriptions    No medications on file     No discharge procedures on file. PDMP Review         Value Time User    PDMP Reviewed  Yes 9/8/2021  9:15 AM Jeison Worley PA-C             ED Provider  Attending physically available and evaluated Lakshmi Bennett. I managed the patient along with the ED Attending.     Electronically Signed by           Ivonne Sprague DO  10/24/23 [id number]

## 2023-10-25 NOTE — ED ATTENDING ATTESTATION
10/24/2023  I, Jorge Bee MD, saw and evaluated the patient. I have discussed the patient with the resident/non-physician practitioner and agree with the resident's/non-physician practitioner's findings, Plan of Care, and MDM as documented in the resident's/non-physician practitioner's note, except where noted. All available labs and Radiology studies were reviewed. I was present for key portions of any procedure(s) performed by the resident/non-physician practitioner and I was immediately available to provide assistance. At this point I agree with the current assessment done in the Emergency Department. I have conducted an independent evaluation of this patient a history and physical is as follows: Body aches and flulike symptoms. Also had a positive pregnancy test about a month ago and wants to check into see her pregnancy. Patient has not had a documented IUP. She is not having abdominal pain. She has nausea but is not vomiting. She has no other significant comorbidities. Her exam is benign except for some mild tachycardia. Bedside ultrasound shows an IUP.   We will plan to treat symptomatically for viral syndrome, discharged to follow-up with OB later this week  ED Course         Critical Care Time  Procedures

## 2023-10-26 ENCOUNTER — OFFICE VISIT (OUTPATIENT)
Dept: OBGYN CLINIC | Facility: CLINIC | Age: 33
End: 2023-10-26
Payer: COMMERCIAL

## 2023-10-26 ENCOUNTER — VBI (OUTPATIENT)
Dept: FAMILY MEDICINE CLINIC | Facility: CLINIC | Age: 33
End: 2023-10-26

## 2023-10-26 VITALS
HEIGHT: 62 IN | WEIGHT: 172.4 LBS | DIASTOLIC BLOOD PRESSURE: 80 MMHG | BODY MASS INDEX: 31.73 KG/M2 | SYSTOLIC BLOOD PRESSURE: 122 MMHG

## 2023-10-26 DIAGNOSIS — N91.2 AMENORRHEA: Primary | ICD-10-CM

## 2023-10-26 PROCEDURE — 99214 OFFICE O/P EST MOD 30 MIN: CPT | Performed by: OBSTETRICS & GYNECOLOGY

## 2023-10-26 PROCEDURE — 76817 TRANSVAGINAL US OBSTETRIC: CPT | Performed by: OBSTETRICS & GYNECOLOGY

## 2023-10-26 NOTE — TELEPHONE ENCOUNTER
10/26/23 10:42 AM    Patient contacted post ED visit, first outreach attempt made. Message was left for patient to return a call to the VBI Department at [facility]: Phone [phone]. Thank you.   Valeria Arellano  [facility]

## 2023-10-26 NOTE — TELEPHONE ENCOUNTER
10/26/23 12:01 PM    Patient contacted post ED visit, VBI department spoke with patient/caregiver and outreach was successful. Thank you.   Valeria Arellano  [facility]

## 2023-10-26 NOTE — PROGRESS NOTES
Assessment/Plan:  Diagnoses and all orders for this visit:    Amenorrhea  -     AMB US OB < 14 weeks single or first gestation level 1    Other orders  -     Prenatal Vit-Fe Fumarate-FA (PRENATAL PO); Take by mouth        - Viable IUP @ 9w 6d EGA  - MCKAYLA 2024  - Continue PNV  - Patient to call for concerns  - RTO 3 weeks for OB intake            Subjective:       Patient ID: Lawson Chaney 1990        Lawson Chaney is a 26 y.o. [id number] presenting to the office for pregnancy confirmation. Patient's last menstrual period was 2023. , placing her at 9w6d today this did not correlate with her ultrasound today. Ultrasound measurements show a CRL of 9w0d. Will use EDC of 2024 based on ultrasound today. She is feeling well overall with some nausea. Hx of  x 2, c/s x 1(cord prolapse) then  x 1 (2020)    Pt was concerned about issue with placenta in last delivery. Following review of her delivery report she had a manual extraction of the placenta.          OB History    Para Term  AB Living   6 4 3 1 1 4   SAB IAB Ectopic Multiple Live Births   1 0 0 0 4      # Outcome Date GA Lbr Deni/2nd Weight Sex Delivery Anes PTL Lv   6             5 Term 20 38w0d / 00:08 3714 g (8 lb 3 oz) M Vag-Spont EPI N SUE   4 SAB 2019 6w0d    SAB      3  10/23/12 35w6d  2807 g (6 lb 3 oz) M CS-LTranv EPI Y SUE      Birth Comments: prolapsed cord, emerg c/s      Complications:  delivery, Delivery by emergency    2 Term 08 38w0d  3657 g (8 lb 1 oz) M  EPI N SUE   1 Term 06 39w0d  3657 g (8 lb 1 oz) F  EPI N SUE      Obstetric Comments   Vaginal birth x 2    x 1         The following portions of the patient's history were reviewed and updated as appropriate: allergies, current medications, past family history, past medical history, past social history, past surgical history, and problem list.    Allergies:  Patient has no known allergies. Medications:    Current Outpatient Medications:     Prenatal Vit-Fe Fumarate-FA (PRENATAL PO), Take by mouth, Disp: , Rfl:     Advair Diskus 100-50 MCG/DOSE inhaler, INHALE 1 PUFF 2 (TWO) TIMES A DAY RINSE MOUTH AFTER USE (Patient not taking: Reported on 3/24/2022), Disp: 60 blister, Rfl: 0    ferrous sulfate 325 (65 Fe) mg tablet, Take 325 mg by mouth daily with breakfast (Patient not taking: Reported on 10/26/2023), Disp: , Rfl:     folic acid (FOLVITE) 485 mcg tablet, Take 1 tablet (400 mcg total) by mouth daily (Patient not taking: Reported on 10/26/2023), Disp: 90 tablet, Rfl: 0    hydrocortisone 1 % cream, APPLY 1 APPLICATION TOPICALLY 4 (FOUR) TIMES A DAY AS NEEDED (APPLY TO PERINEUM) (Patient not taking: Reported on 3/17/2023), Disp: 28.35 g, Rfl: 5    Multiple Vitamin (multivitamin) tablet, Take 1 tablet by mouth daily (Patient not taking: Reported on 10/26/2023), Disp: , Rfl:     norelgestromin-ethinyl estradiol (ORTHO EVRA) 150-35 MCG/24HR, Apply patch weekly for 3 weeks, then remove and leave off for 1 week. (Patient not taking: Reported on 12/9/2022), Disp: 9 patch, Rfl: 4      Review of Systems:   Review of Systems       Objective:       Visit Vitals  /80 (BP Location: Right arm, Patient Position: Sitting, Cuff Size: Standard)   Ht 5' 1.5" (1.562 m)   Wt 78.2 kg (172 lb 6.4 oz)   LMP 08/18/2023   Breastfeeding Unknown   BMI 32.05 kg/m²   OB Status Unknown   Smoking Status Never   BSA 1.78 m²        GEN: The patient was alert and oriented x3, pleasant well-appearing female in no acute distress.    CV: Regular rate  PULM: nonlabored respirations  MSK: Normal gait  Skin: warm, dry  Neuro: no focal deficits  Psych: normal affect and judgement, cooperative    Ultrasound:     Viability US     Gestational sac: present               Location: intrauterine  Yolk sac: present  Fetal pole: present               CRL: 2.37 cm = 9w0d  Cardiac activity: present               Rate: 165 bpm     Ovaries: normal appearing bilaterally  Cul de sac: absence of free fluid  Uterus: normal in appearance           Ultrasound Probe Disinfection    A transvaginal ultrasound was performed.    Prior to use, disinfection was performed with High Level Disinfection Process (Trophon)  Probe serial number RVRSDE: [id number] was used    Rachel Murray MD  10/26/23  8:31 AM

## 2023-11-02 ENCOUNTER — INITIAL PRENATAL (OUTPATIENT)
Dept: OBGYN CLINIC | Facility: CLINIC | Age: 33
End: 2023-11-02

## 2023-11-02 VITALS — WEIGHT: 172 LBS | BODY MASS INDEX: 31.97 KG/M2

## 2023-11-02 DIAGNOSIS — Z23 NEED FOR INFLUENZA VACCINATION: ICD-10-CM

## 2023-11-02 DIAGNOSIS — O99.841 PREGNANCY AFFECTED BY PREVIOUS BARIATRIC SURGERY, CURRENTLY IN FIRST TRIMESTER: Primary | ICD-10-CM

## 2023-11-02 NOTE — PROGRESS NOTES
OB INTAKE INTERVIEW  Pt presents for OB intake. The patient was oriented to our practice and all questions were answered. Plan:  - Prenatal labs ordered  - Referral given for MFM at confirmation scan  - [facility] screens reviewed  - Patient to call for concerns  - RTO 3-4 weeks for OB F/U visit and PAP/Cultures    ~Last pap: 01/15/20   ~Last gc/c:      OB History          7    Para   4    Term   3       1    AB   2    Living   4         SAB   2    IAB   0    Ectopic   0    Multiple   0    Live Births   4           Obstetric Comments   Vaginal birth x 2   x 1                   Last Menstrual Period:     23              Ultrasound date: -w       Current Issues:  Constipation :      denies           Headaches :                denies  Cramping:                   denies  Spotting :                 denies    Current Medications:      Bariatric PNV     Interview education  [facility] Pregnancy Essentials reviewed and discussed   Baby and [address] MFM Handouts  Discussed genetic testing  Prenatal lab work: Scripts printed and given to pt.    FMLA paperwork to be started around 28 weeks      Prior Pregnancy Delivery Complications              History of  delivery or PPROM: yes  History of Shoulder Dystocia: no              History of vacuum or forceps delivery: no              History of 3rd/4th degree laceration: no              History of  section: yes     Diabetes              Pregestational DM: no              hx of GDM*: no              BMI >35*: no              first degree relative with type 2 diabetes*: no              hx of PCOS: No              current metformin use: no              prior hx of LGA/macrosomia*: no  *If yes add early 1HR glucose*               Hypertension              Hx of chronic HTN*: no              hx of gestational HTN*: no              hx of preeclampsia, eclampsia, or HELLP syndrome*: no              Age 28 or older: yes              Multifetal gestation:no  Type 1 or Type 2 DM:no  Renal Disease: no  Autoimmune disease (systemic lupus erythematosus, antiphospholipid antibody syndrome): no  Nulliparity: no  Obesity (BMI over 30): no  More than 10 year pregnancy interval: no  Previous IUGR, low birthweight or small for gestational age:no  *If yes add: CMP, PrCr, Uric acid*     Immunizations:                influenza vaccine: Recs discussed, pt accepts today               Covid Vaccination: first 2.    Discussed Tdap vaccine administration at 27-28 weeks                   Immunization History   Administered Date(s) Administered    Influenza Quadrivalent, 6-35 Months IM 01/05/2015    Tdap 06/02/2020            Dental visit with last 6 months: no, recs discussed  PHQ-2/9 score: 0   - Hx of depression or post partum depression?: belem Tovar activated (not 1318 years of age)?: y  ONAF submitted?: n/a

## 2023-11-04 PROBLEM — N30.00 ACUTE CYSTITIS WITHOUT HEMATURIA: Status: RESOLVED | Noted: 2023-09-05 | Resolved: 2023-11-04

## 2023-11-07 ENCOUNTER — APPOINTMENT (OUTPATIENT)
Dept: LAB | Facility: CLINIC | Age: 33
End: 2023-11-07
Payer: COMMERCIAL

## 2023-11-07 DIAGNOSIS — O99.841 PREGNANCY AFFECTED BY PREVIOUS BARIATRIC SURGERY, CURRENTLY IN FIRST TRIMESTER: ICD-10-CM

## 2023-11-07 LAB
25(OH)D3 SERPL-MCNC: 24.7 NG/ML (ref 30–100)
ABO GROUP BLD: NORMAL
ALBUMIN SERPL BCP-MCNC: 4.1 G/DL (ref 3.5–5)
ALP SERPL-CCNC: 40 U/L (ref 34–104)
ALT SERPL W P-5'-P-CCNC: 13 U/L (ref 7–52)
ANION GAP SERPL CALCULATED.3IONS-SCNC: 9 MMOL/L
AST SERPL W P-5'-P-CCNC: 14 U/L (ref 13–39)
BASOPHILS # BLD AUTO: 0.02 THOUSANDS/ÂΜL (ref 0–0.1)
BASOPHILS NFR BLD AUTO: 0 % (ref 0–1)
BILIRUB SERPL-MCNC: 0.34 MG/DL (ref 0.2–1)
BLD GP AB SCN SERPL QL: NEGATIVE
BUN SERPL-MCNC: 8 MG/DL (ref 5–25)
CALCIUM SERPL-MCNC: 8.8 MG/DL (ref 8.4–10.2)
CHLORIDE SERPL-SCNC: 104 MMOL/L (ref 96–108)
CO2 SERPL-SCNC: 24 MMOL/L (ref 21–32)
CREAT SERPL-MCNC: 0.51 MG/DL (ref 0.6–1.3)
EOSINOPHIL # BLD AUTO: 0.09 THOUSAND/ÂΜL (ref 0–0.61)
EOSINOPHIL NFR BLD AUTO: 1 % (ref 0–6)
ERYTHROCYTE [DISTWIDTH] IN BLOOD BY AUTOMATED COUNT: 12.1 % (ref 11.6–15.1)
EST. AVERAGE GLUCOSE BLD GHB EST-MCNC: 97 MG/DL
FERRITIN SERPL-MCNC: 110 NG/ML (ref 11–307)
GFR SERPL CREATININE-BSD FRML MDRD: 124 ML/MIN/1.73SQ M
GLUCOSE P FAST SERPL-MCNC: 83 MG/DL (ref 65–99)
HBA1C MFR BLD: 5 %
HBV SURFACE AG SER QL: NORMAL
HCT VFR BLD AUTO: 37.3 % (ref 34.8–46.1)
HCV AB SER QL: NORMAL
HGB BLD-MCNC: 12.6 G/DL (ref 11.5–15.4)
HIV 1+2 AB+HIV1 P24 AG SERPL QL IA: NORMAL
HIV 2 AB SERPL QL IA: NORMAL
HIV1 AB SERPL QL IA: NORMAL
HIV1 P24 AG SERPL QL IA: NORMAL
IMM GRANULOCYTES # BLD AUTO: 0.03 THOUSAND/UL (ref 0–0.2)
IMM GRANULOCYTES NFR BLD AUTO: 1 % (ref 0–2)
IRON SERPL-MCNC: 121 UG/DL (ref 50–212)
LYMPHOCYTES # BLD AUTO: 1.59 THOUSANDS/ÂΜL (ref 0.6–4.47)
LYMPHOCYTES NFR BLD AUTO: 25 % (ref 14–44)
MCH RBC QN AUTO: 30.4 PG (ref 26.8–34.3)
MCHC RBC AUTO-ENTMCNC: 33.8 G/DL (ref 31.4–37.4)
MCV RBC AUTO: 90 FL (ref 82–98)
MONOCYTES # BLD AUTO: 0.5 THOUSAND/ÂΜL (ref 0.17–1.22)
MONOCYTES NFR BLD AUTO: 8 % (ref 4–12)
NEUTROPHILS # BLD AUTO: 4.17 THOUSANDS/ÂΜL (ref 1.85–7.62)
NEUTS SEG NFR BLD AUTO: 65 % (ref 43–75)
NRBC BLD AUTO-RTO: 0 /100 WBCS
PLATELET # BLD AUTO: 247 THOUSANDS/UL (ref 149–390)
PMV BLD AUTO: 11.2 FL (ref 8.9–12.7)
POTASSIUM SERPL-SCNC: 3.3 MMOL/L (ref 3.5–5.3)
PROT SERPL-MCNC: 6.7 G/DL (ref 6.4–8.4)
RBC # BLD AUTO: 4.15 MILLION/UL (ref 3.81–5.12)
RH BLD: POSITIVE
RUBV IGG SERPL IA-ACNC: 16.4 IU/ML
SODIUM SERPL-SCNC: 137 MMOL/L (ref 135–147)
SPECIMEN EXPIRATION DATE: NORMAL
TREPONEMA PALLIDUM IGG+IGM AB [PRESENCE] IN SERUM OR PLASMA BY IMMUNOASSAY: NORMAL
TSH SERPL DL<=0.05 MIU/L-ACNC: 1.25 UIU/ML (ref 0.45–4.5)
VIT B12 SERPL-MCNC: 474 PG/ML (ref 180–914)
VZV IGG SER QL IA: ABNORMAL
WBC # BLD AUTO: 6.4 THOUSAND/UL (ref 4.31–10.16)

## 2023-11-07 PROCEDURE — 87086 URINE CULTURE/COLONY COUNT: CPT

## 2023-11-07 PROCEDURE — 86901 BLOOD TYPING SEROLOGIC RH(D): CPT

## 2023-11-07 PROCEDURE — 36415 COLL VENOUS BLD VENIPUNCTURE: CPT

## 2023-11-07 PROCEDURE — 86780 TREPONEMA PALLIDUM: CPT

## 2023-11-07 PROCEDURE — 84425 ASSAY OF VITAMIN B-1: CPT

## 2023-11-07 PROCEDURE — 87389 HIV-1 AG W/HIV-1&-2 AB AG IA: CPT

## 2023-11-07 PROCEDURE — 83036 HEMOGLOBIN GLYCOSYLATED A1C: CPT

## 2023-11-07 PROCEDURE — 86787 VARICELLA-ZOSTER ANTIBODY: CPT

## 2023-11-07 PROCEDURE — 86762 RUBELLA ANTIBODY: CPT

## 2023-11-07 PROCEDURE — 84590 ASSAY OF VITAMIN A: CPT

## 2023-11-07 PROCEDURE — 82607 VITAMIN B-12: CPT

## 2023-11-07 PROCEDURE — 87340 HEPATITIS B SURFACE AG IA: CPT

## 2023-11-07 PROCEDURE — 86850 RBC ANTIBODY SCREEN: CPT

## 2023-11-07 PROCEDURE — 80053 COMPREHEN METABOLIC PANEL: CPT

## 2023-11-07 PROCEDURE — 85025 COMPLETE CBC W/AUTO DIFF WBC: CPT

## 2023-11-07 PROCEDURE — 84443 ASSAY THYROID STIM HORMONE: CPT

## 2023-11-07 PROCEDURE — 86900 BLOOD TYPING SEROLOGIC ABO: CPT

## 2023-11-07 PROCEDURE — 84630 ASSAY OF ZINC: CPT

## 2023-11-07 PROCEDURE — 83540 ASSAY OF IRON: CPT

## 2023-11-07 PROCEDURE — 82306 VITAMIN D 25 HYDROXY: CPT

## 2023-11-07 PROCEDURE — 82728 ASSAY OF FERRITIN: CPT

## 2023-11-07 PROCEDURE — 86803 HEPATITIS C AB TEST: CPT

## 2023-11-09 LAB — BACTERIA UR CULT: NORMAL

## 2023-11-11 LAB — VIT B1 BLD-SCNC: 102.5 NMOL/L (ref 66.5–200)

## 2023-11-13 LAB — VIT A SERPL-MCNC: 36.9 UG/DL (ref 18.9–57.3)

## 2023-11-13 NOTE — RESULT ENCOUNTER NOTE
Overall you prenatal labs and vitamin levels look very normal  Your Vit D is a little low - please start a 800 IU supplement daily  You are not immune to varicella (chicken pox) - following pregnancy you can have a booster  If you have any questions, please call the office  DrG

## 2023-11-17 LAB — ZINC SERPL-MCNC: 79 UG/DL (ref 44–115)

## 2023-11-21 ENCOUNTER — ROUTINE PRENATAL (OUTPATIENT)
Facility: HOSPITAL | Age: 33
End: 2023-11-21
Attending: OBSTETRICS & GYNECOLOGY
Payer: COMMERCIAL

## 2023-11-21 VITALS
HEIGHT: 62 IN | SYSTOLIC BLOOD PRESSURE: 114 MMHG | WEIGHT: 173.2 LBS | DIASTOLIC BLOOD PRESSURE: 60 MMHG | BODY MASS INDEX: 31.87 KG/M2 | HEART RATE: 97 BPM

## 2023-11-21 DIAGNOSIS — O09.211 PREVIOUS PRETERM DELIVERY IN FIRST TRIMESTER, ANTEPARTUM: ICD-10-CM

## 2023-11-21 DIAGNOSIS — E55.9 VITAMIN D DEFICIENCY: ICD-10-CM

## 2023-11-21 DIAGNOSIS — O99.841 PREVIOUS GASTRIC BYPASS AFFECTING PREGNANCY IN FIRST TRIMESTER, ANTEPARTUM: ICD-10-CM

## 2023-11-21 DIAGNOSIS — O34.211 MATERNAL CARE DUE TO LOW TRANSVERSE UTERINE SCAR FROM PREVIOUS CESAREAN DELIVERY: ICD-10-CM

## 2023-11-21 DIAGNOSIS — Z3A.13 13 WEEKS GESTATION OF PREGNANCY: ICD-10-CM

## 2023-11-21 DIAGNOSIS — Z36.82 NUCHAL TRANSLUCENCY OF FETUS ON PRENATAL ULTRASOUND: ICD-10-CM

## 2023-11-21 DIAGNOSIS — O09.521 MULTIGRAVIDA OF ADVANCED MATERNAL AGE IN FIRST TRIMESTER: Primary | ICD-10-CM

## 2023-11-21 PROBLEM — K42.9 UMBILICAL HERNIA: Status: ACTIVE | Noted: 2023-11-21

## 2023-11-21 PROBLEM — R73.09 ELEVATED GLUCOSE TOLERANCE TEST: Status: RESOLVED | Noted: 2020-05-28 | Resolved: 2023-11-21

## 2023-11-21 PROCEDURE — 76801 OB US < 14 WKS SINGLE FETUS: CPT | Performed by: OBSTETRICS & GYNECOLOGY

## 2023-11-21 PROCEDURE — 76813 OB US NUCHAL MEAS 1 GEST: CPT | Performed by: OBSTETRICS & GYNECOLOGY

## 2023-11-21 PROCEDURE — 36415 COLL VENOUS BLD VENIPUNCTURE: CPT | Performed by: OBSTETRICS & GYNECOLOGY

## 2023-11-21 PROCEDURE — 99214 OFFICE O/P EST MOD 30 MIN: CPT | Performed by: OBSTETRICS & GYNECOLOGY

## 2023-11-21 NOTE — LETTER
2023     Linda Fallon MD  [address] [facility]  [address]  [address] [facility]    Patient: Felisa Silverio   YOB: 1990   Date of Visit: 2023       Dear Dr. Nitesh Nick:    Thank you for referring Felisa Silverio to me for evaluation. Below are my notes for this consultation. If you have questions, please do not hesitate to call me. I look forward to following your patient along with you. Sincerely,        Adela Vega MD        CC: No Recipients    Adela Vega MD  2023 12:43 PM  Sign when Signing Visit  OFFICE CONSULT  Referring physician:   Linda Fallon Md  [address],  [address] [place] [address]      Dear Dr. Nitesh Nick      Thank you for requesting a  consultation on your patient Ms. Felisa Silverio for the following indications:  Genetic screening    History  Medications: Prenatal vitamins and hydrocortisone cream she reports she is on vitamin D 5000 units daily   Allergies to medications: None  Past medical history: Umbilical hernia, PCOS, vitamin D deficiency, iron deficiency anemia from a prior Aruna-en-Y, irregular periods, advanced maternal age, asthma  Past surgical history: History of a laparoscopic Aruna-en-Y 2021, history of a  section 10/23/2012  Past obstetrical history:   2006 at 45 weeks she had an 8 pound 3 ounce baby girl vaginally without complications   at 44 weeks she had an 8 pound 1 ounce baby boy vaginally without complications   at 35.5 weeks she had a 6 pound 3 ounce baby boy by  section. During that pregnancy she went into  labor and developed a cord prolapse and required an emergency   2020 at 38 weeks she had an 8 pound 3 ounce baby boy vaginally.   Her delivery was complicated by a nuchal cord and a retained placenta that required a manual removal  2019 at 5 weeks she had a miscarriage that did not require surgery  12/2022 she had a 7 to 8-week miscarriage that did not require surgery  Social history: She reports no substance use  First generation family history: None    She had anormal hemoglobin A1c and fasting blood sugar in early pregnancy. Her vitamin D level was 24.7 on 5000 iu of vit d daily. The rest of her baseline vitamin levels including zinc, vitamin B1, vitamin A, vitamin B12, iron, ferritin are all in the normal range. She reports her ferritin is in the normal range because she normally gets iron infusions through hematology which she will continue during pregnancy. Ultrasound findings: The ultrasound shows a fetus concordant with dates. The nasal bone and nuchal translucency appears normal. No malformations are seen on today's early ultrasound. The patient was informed of the findings and counseled about the limitations of the exam in detecting all forms of fetal congenital abnormalities. She does not report any vaginal bleeding or uterine cramping or contractions. Specific counseling was provided on the following problems: We discussed the options for genetic screening which include invasive testing on the fetal placenta or on fetal skin cells within the amniotic fluid and compared this to noninvasive testing which includes cell free DNA screening. We reviewed the risks, the benefits and the limitations of each. In the end patient chose to complete the cell free DNA screen. Recommend he ob prescribe a glucometer for diabetes screening at 28 weeks for 1 week given her history of a Aruna-en-Y. She should check fastings daily during that time and should eat regular meals over a 15-minute time and check a 2-hour blood sugar after the start of each meal.  This diabetes screening should be done on a normal diet. Advanced Maternal Age (AMA) is defined as maternal age 28 or greater at the best estimation of the due date.  Advanced maternal age is associated with an increased risk of several pregnancy outcomes, including aneuploidy/genetic syndromes, poor fetal growth, stillbirth, maternal hypertensive disorders, and gestational diabetes. Despite these increased risks, many women of advanced maternal age have normal, healthy pregnancy outcomes, particularly if they have no co-existing medical conditions. Risk of adverse outcomes is proportional to patient age. History of a prior Aruna-en-Y which can be associated with an increased risk for fetal growth restriction. Recommend a follow-up ultrasound at 32 weeks for growth. She will continue to follow with hematology for her IV iron infusions. Vitamin D deficiency-sent a message to Liza Session to see what dose of vitamin D should be prescribed since she is still deficient on 5000 national units daily. With her history of a prior  birth recommend serial cervical lengths between 16 to 23 weeks and 6 days occurring every 2 weeks. Should she develop a short cervix less than 2.5 cm and she may be a candidate for cerclage and or vaginal progesterone. Placenta is anterior so she may be at increased risk for an accreta based on her history of a . She also had a history of a prior retained placenta that required manual removal but I reviewed her previous pregnancy after she left my office and that placenta was not anterior near her  section scar it was actually more in the right lateral fundal region which was not near her prior  scar. Future tests recommended: The results of her NIPT will return in 7-10 days. Screening for spina bifida with an MSAFP screen is a future test that can be prescribed through her OB office. This blood work should be drawn preferably at 16 to 18 weeks so that the results return prior to her next scan. The test though can be run until 21 weeks and 6 days if needed.       Future ultrasounds ordered today:   Fetal Level II ultrasound imaging is recommended at 19-20 weeks' gestation. Serial cervical lengths were scheduled between 16 and 23 weeks and 6 days every 2 weeks. After speaking with Ruben Loza from nutrition and reviewing the literature I will change patient to 50,000 u weekly for vit d  for 6 weeks and will check a vit d level in 6 weeks. Pre visit time reviewing her records   15 minutes  Face to face time 20 minutes  Post visit time on documentation of note, updating her problem list, adding orders and prescriptions 20 minutes. Procedures that were completed today were charged separately. The level of decision making was moderate complexity.     George Fitch MD

## 2023-11-21 NOTE — PROGRESS NOTES
Patient chose to have [facility] Non-invasive Prenatal Screen with fetal sex. Patient choose billed through insurance. Patient assistance program (PAP) application provided to patient yes. PAP sent with specimen No.     Patient given brochure and is aware [employer] will contact their insurance and coordinate coverage. Patient made aware she will receive a text message and e-mail from [employer]. Patient informed text/phone call message will come from area code  "415". Provided [employer] # [phone] and web site at [employer]   "[place] your test online" card with barcode and test tube ID provided to patient. Reviewed [employer]'s web site states 5-7 business days for results via their portal.   [employer] message will be sent to patient when [facility] receives results /provider reviews. 2 vials of blood drawn from  right arm by Mayte WOODARD MA. Patient tolerated blood draw without difficulty. Specimens labeled with patient identifiers (name, date of birth, specimen collection date), order and specimen were verified with patient, packed and sent via [facility]. FED EX  tracking #  [id number]  Copy of lab order scanned to Epic media. [facility] will have results in approximately 7-10 business days and will call patient or notify via [address] [place] [address]. Patient aware viewing lab result online will reveal fetal sex if ordered. Patient verbalized understanding of all instructions and no questions at this time.

## 2023-11-22 NOTE — PROGRESS NOTES
OFFICE CONSULT  Referring physician:   Paz Pearson Md  [address] [facility],  [facility]      Dear Dr. Afsaneh Tee      Thank you for requesting a  consultation on your patient Ms. Suman Renee for the following indications:  Genetic screening    History  Medications: Prenatal vitamins and hydrocortisone cream she reports she is on vitamin D 5000 units daily   Allergies to medications: None  Past medical history: Umbilical hernia, PCOS, vitamin D deficiency, iron deficiency anemia from a prior Aruna-en-Y, irregular periods, advanced maternal age, asthma  Past surgical history: History of a laparoscopic Aruna-en-Y 2021, history of a  section 10/23/2012  Past obstetrical history:   2006 at 45 weeks she had an 8 pound 3 ounce baby girl vaginally without complications   at 44 weeks she had an 8 pound 1 ounce baby boy vaginally without complications   at 35.5 weeks she had a 6 pound 3 ounce baby boy by  section. During that pregnancy she went into  labor and developed a cord prolapse and required an emergency   2020 at 38 weeks she had an 8 pound 3 ounce baby boy vaginally. Her delivery was complicated by a nuchal cord and a retained placenta that required a manual removal  2019 at 5 weeks she had a miscarriage that did not require surgery  2022 she had a 7 to 8-week miscarriage that did not require surgery  Social history: She reports no substance use  First generation family history: None    She had anormal hemoglobin A1c and fasting blood sugar in early pregnancy. Her vitamin D level was 24.7 on 5000 iu of vit d daily. The rest of her baseline vitamin levels including zinc, vitamin B1, vitamin A, vitamin B12, iron, ferritin are all in the normal range.   She reports her ferritin is in the normal range because she normally gets iron infusions through hematology which she will continue during pregnancy. Ultrasound findings: The ultrasound shows a fetus concordant with dates. The nasal bone and nuchal translucency appears normal. No malformations are seen on today's early ultrasound. The patient was informed of the findings and counseled about the limitations of the exam in detecting all forms of fetal congenital abnormalities. She does not report any vaginal bleeding or uterine cramping or contractions. Specific counseling was provided on the following problems: We discussed the options for genetic screening which include invasive testing on the fetal placenta or on fetal skin cells within the amniotic fluid and compared this to noninvasive testing which includes cell free DNA screening. We reviewed the risks, the benefits and the limitations of each. In the end patient chose to complete the cell free DNA screen. Recommend he ob prescribe a glucometer for diabetes screening at 28 weeks for 1 week given her history of a Aruna-en-Y. She should check fastings daily during that time and should eat regular meals over a 15-minute time and check a 2-hour blood sugar after the start of each meal.  This diabetes screening should be done on a normal diet. Advanced Maternal Age (AMA) is defined as maternal age 28 or greater at the best estimation of the due date. Advanced maternal age is associated with an increased risk of several pregnancy outcomes, including aneuploidy/genetic syndromes, poor fetal growth, stillbirth, maternal hypertensive disorders, and gestational diabetes. Despite these increased risks, many women of advanced maternal age have normal, healthy pregnancy outcomes, particularly if they have no co-existing medical conditions. Risk of adverse outcomes is proportional to patient age. History of a prior Aruna-en-Y which can be associated with an increased risk for fetal growth restriction. Recommend a follow-up ultrasound at 32 weeks for growth.   She will continue to follow with hematology for her IV iron infusions. Vitamin D deficiency-sent a message to Michel Ramesh to see what dose of vitamin D should be prescribed since she is still deficient on 5000 national units daily. With her history of a prior  birth recommend serial cervical lengths between 16 to 23 weeks and 6 days occurring every 2 weeks. Should she develop a short cervix less than 2.5 cm and she may be a candidate for cerclage and or vaginal progesterone. Placenta is anterior so she may be at increased risk for an accreta based on her history of a . She also had a history of a prior retained placenta that required manual removal but I reviewed her previous pregnancy after she left my office and that placenta was not anterior near her  section scar it was actually more in the right lateral fundal region which was not near her prior  scar. Future tests recommended: The results of her NIPT will return in 7-10 days. Screening for spina bifida with an MSAFP screen is a future test that can be prescribed through her OB office. This blood work should be drawn preferably at 16 to 18 weeks so that the results return prior to her next scan. The test though can be run until 21 weeks and 6 days if needed. Future ultrasounds ordered today:   Fetal Level II ultrasound imaging is recommended at 19-20 weeks' gestation. Serial cervical lengths were scheduled between 16 and 23 weeks and 6 days every 2 weeks. After speaking with Luis Eduardo Chin from nutrition and reviewing the literature I will change patient to 50,000 u weekly for vit d  for 6 weeks and will check a vit d level in 6 weeks. Pre visit time reviewing her records   15 minutes  Face to face time 20 minutes  Post visit time on documentation of note, updating her problem list, adding orders and prescriptions 20 minutes. Procedures that were completed today were charged separately.    The level of decision making was moderate complexity.     Jayesh Lee MD

## 2023-11-23 PROBLEM — O09.211 PREVIOUS PRETERM DELIVERY IN FIRST TRIMESTER, ANTEPARTUM: Status: ACTIVE | Noted: 2023-11-23

## 2023-11-23 RX ORDER — ERGOCALCIFEROL 1.25 MG/1
50000 CAPSULE ORAL WEEKLY
Qty: 6 CAPSULE | Refills: 0 | Status: SHIPPED | OUTPATIENT
Start: 2023-11-23 | End: 2023-12-29

## 2023-12-01 ENCOUNTER — ROUTINE PRENATAL (OUTPATIENT)
Dept: OBGYN CLINIC | Facility: CLINIC | Age: 33
End: 2023-12-01

## 2023-12-01 VITALS — BODY MASS INDEX: 32.79 KG/M2 | WEIGHT: 176.4 LBS | DIASTOLIC BLOOD PRESSURE: 62 MMHG | SYSTOLIC BLOOD PRESSURE: 98 MMHG

## 2023-12-01 DIAGNOSIS — Z3A.14 14 WEEKS GESTATION OF PREGNANCY: ICD-10-CM

## 2023-12-01 DIAGNOSIS — O09.522 MULTIGRAVIDA OF ADVANCED MATERNAL AGE IN SECOND TRIMESTER: Primary | ICD-10-CM

## 2023-12-01 PROCEDURE — G0145 SCR C/V CYTO,THINLAYER,RESCR: HCPCS | Performed by: OBSTETRICS & GYNECOLOGY

## 2023-12-01 PROCEDURE — 87591 N.GONORRHOEAE DNA AMP PROB: CPT | Performed by: OBSTETRICS & GYNECOLOGY

## 2023-12-01 PROCEDURE — PNV: Performed by: OBSTETRICS & GYNECOLOGY

## 2023-12-01 PROCEDURE — 87491 CHLMYD TRACH DNA AMP PROBE: CPT | Performed by: OBSTETRICS & GYNECOLOGY

## 2023-12-01 PROCEDURE — G0476 HPV COMBO ASSAY CA SCREEN: HCPCS | Performed by: OBSTETRICS & GYNECOLOGY

## 2023-12-01 NOTE — PROGRESS NOTES
26 y.o. [id number] female at 14w1d (Estimated Date of Delivery: 24) for PNV. Pre- Vitals      Flowsheet Row Most Recent Value   Prenatal Assessment    Prenatal Vitals    Blood Pressure 98/62   Weight - Scale 80 kg (176 lb 6.4 oz)   Urine Albumin/Glucose    Dilation/Effacement/Station    Vaginal Drainage    Edema           TWG: Not found. Discussed EDC-  based on earliest u/s at 9 weeks.   Pap with Gc collected today  msAFP ordered and discussed  Pt feeling well  Scheduled for level II u/s

## 2023-12-01 NOTE — PROGRESS NOTES
Routine prenatal 15 weeks, gc and pap. Pt is well, states there are no concerns. Denies vb,lof, ad ctx.

## 2023-12-05 LAB
C TRACH DNA SPEC QL NAA+PROBE: NEGATIVE
N GONORRHOEA DNA SPEC QL NAA+PROBE: NEGATIVE

## 2023-12-08 LAB
LAB AP GYN PRIMARY INTERPRETATION: NORMAL
LAB AP LMP: NORMAL
Lab: NORMAL

## 2023-12-12 ENCOUNTER — ROUTINE PRENATAL (OUTPATIENT)
Dept: PERINATAL CARE | Facility: OTHER | Age: 33
End: 2023-12-12
Payer: COMMERCIAL

## 2023-12-12 VITALS
HEIGHT: 62 IN | BODY MASS INDEX: 32.94 KG/M2 | DIASTOLIC BLOOD PRESSURE: 64 MMHG | SYSTOLIC BLOOD PRESSURE: 112 MMHG | HEART RATE: 88 BPM | WEIGHT: 179 LBS

## 2023-12-12 DIAGNOSIS — Z3A.15 15 WEEKS GESTATION OF PREGNANCY: ICD-10-CM

## 2023-12-12 DIAGNOSIS — O09.521 MULTIGRAVIDA OF ADVANCED MATERNAL AGE IN FIRST TRIMESTER: ICD-10-CM

## 2023-12-12 DIAGNOSIS — Z03.75 ENCOUNTER FOR SUSPECTED CERVICAL SHORTENING RULED OUT: ICD-10-CM

## 2023-12-12 DIAGNOSIS — O09.211 PREVIOUS PRETERM DELIVERY IN FIRST TRIMESTER, ANTEPARTUM: Primary | ICD-10-CM

## 2023-12-12 PROCEDURE — 76815 OB US LIMITED FETUS(S): CPT | Performed by: STUDENT IN AN ORGANIZED HEALTH CARE EDUCATION/TRAINING PROGRAM

## 2023-12-12 PROCEDURE — 76817 TRANSVAGINAL US OBSTETRIC: CPT | Performed by: STUDENT IN AN ORGANIZED HEALTH CARE EDUCATION/TRAINING PROGRAM

## 2023-12-12 NOTE — PROGRESS NOTES
[facility]: Ms. Cherlynn Felty was seen today for serial cervical length screening ultrasound. See ultrasound report under "OB Procedures" tab. Please don't hesitate to contact our office with any concerns or questions.   -Pearl Gerber MD

## 2023-12-26 ENCOUNTER — ROUTINE PRENATAL (OUTPATIENT)
Dept: PERINATAL CARE | Facility: CLINIC | Age: 33
End: 2023-12-26
Payer: COMMERCIAL

## 2023-12-26 VITALS
WEIGHT: 180 LBS | HEART RATE: 80 BPM | SYSTOLIC BLOOD PRESSURE: 98 MMHG | DIASTOLIC BLOOD PRESSURE: 62 MMHG | HEIGHT: 61 IN | BODY MASS INDEX: 33.99 KG/M2

## 2023-12-26 DIAGNOSIS — O09.211 PREVIOUS PRETERM DELIVERY IN FIRST TRIMESTER, ANTEPARTUM: Primary | ICD-10-CM

## 2023-12-26 DIAGNOSIS — Z03.75 ENCOUNTER FOR SUSPECTED CERVICAL SHORTENING RULED OUT: ICD-10-CM

## 2023-12-26 DIAGNOSIS — Z3A.17 17 WEEKS GESTATION OF PREGNANCY: ICD-10-CM

## 2023-12-26 PROCEDURE — 76815 OB US LIMITED FETUS(S): CPT | Performed by: STUDENT IN AN ORGANIZED HEALTH CARE EDUCATION/TRAINING PROGRAM

## 2023-12-26 PROCEDURE — 76817 TRANSVAGINAL US OBSTETRIC: CPT | Performed by: STUDENT IN AN ORGANIZED HEALTH CARE EDUCATION/TRAINING PROGRAM

## 2023-12-26 NOTE — PROGRESS NOTES
"[facility]: Ms. Phelan was seen today for serial cervical length screening ultrasound.  See ultrasound report under \"OB Procedures\" tab.   Please don't hesitate to contact our office with any concerns or questions.  -Savannah Lucia MD    "

## 2023-12-27 ENCOUNTER — ROUTINE PRENATAL (OUTPATIENT)
Dept: OBGYN CLINIC | Facility: CLINIC | Age: 33
End: 2023-12-27

## 2023-12-27 VITALS — BODY MASS INDEX: 34.2 KG/M2 | DIASTOLIC BLOOD PRESSURE: 70 MMHG | SYSTOLIC BLOOD PRESSURE: 102 MMHG | WEIGHT: 181 LBS

## 2023-12-27 DIAGNOSIS — O09.219 PREVIOUS PRETERM DELIVERY, ANTEPARTUM: ICD-10-CM

## 2023-12-27 DIAGNOSIS — O99.842 PREVIOUS GASTRIC BYPASS AFFECTING PREGNANCY IN SECOND TRIMESTER, ANTEPARTUM: ICD-10-CM

## 2023-12-27 DIAGNOSIS — Z3A.17 17 WEEKS GESTATION OF PREGNANCY: ICD-10-CM

## 2023-12-27 DIAGNOSIS — O09.522 MULTIGRAVIDA OF ADVANCED MATERNAL AGE IN SECOND TRIMESTER: Primary | ICD-10-CM

## 2023-12-27 PROCEDURE — PNV: Performed by: OBSTETRICS & GYNECOLOGY

## 2023-12-27 NOTE — PROGRESS NOTES
33 y.o.  female at 17w6d (Estimated Date of Delivery: 24) for PNV.    Pt would like to discuss a possible c-sec  Pre-Tamiko Vitals      Flowsheet Row Most Recent Value   Prenatal Assessment    Fetal Heart Rate 140   Prenatal Vitals    Blood Pressure 102/70   Weight - Scale 82.1 kg (181 lb)   Urine Albumin/Glucose    Dilation/Effacement/Station    Vaginal Drainage    Edema           TW.99 kg (11 lb)    Leakage of fluid: no  Vaginal bleeding: no  Contractions/Cramping: no  Fetal movement: no  Seeing MFM q2 weeks for cervical lengths due to prior PTD.   Will need to BS checks instead of glucola.   Interested in R LTCS, wants me to do it. Will schedule for May.   RTO in 2 weeks.

## 2024-01-09 ENCOUNTER — ROUTINE PRENATAL (OUTPATIENT)
Facility: HOSPITAL | Age: 34
End: 2024-01-09
Payer: COMMERCIAL

## 2024-01-09 VITALS
HEIGHT: 61 IN | SYSTOLIC BLOOD PRESSURE: 122 MMHG | HEART RATE: 93 BPM | BODY MASS INDEX: 34.63 KG/M2 | DIASTOLIC BLOOD PRESSURE: 70 MMHG | WEIGHT: 183.4 LBS

## 2024-01-09 DIAGNOSIS — Z36.3 ENCOUNTER FOR ANTENATAL SCREENING FOR MALFORMATION: ICD-10-CM

## 2024-01-09 DIAGNOSIS — O99.842 PREVIOUS GASTRIC BYPASS AFFECTING PREGNANCY IN SECOND TRIMESTER, ANTEPARTUM: ICD-10-CM

## 2024-01-09 DIAGNOSIS — Z03.75 ENCOUNTER FOR SUSPECTED CERVICAL SHORTENING RULED OUT: ICD-10-CM

## 2024-01-09 DIAGNOSIS — O09.219 PREVIOUS PRETERM DELIVERY, ANTEPARTUM: ICD-10-CM

## 2024-01-09 DIAGNOSIS — O09.522 MULTIGRAVIDA OF ADVANCED MATERNAL AGE IN SECOND TRIMESTER: ICD-10-CM

## 2024-01-09 DIAGNOSIS — Z3A.19 19 WEEKS GESTATION OF PREGNANCY: Primary | ICD-10-CM

## 2024-01-09 PROCEDURE — 76817 TRANSVAGINAL US OBSTETRIC: CPT | Performed by: STUDENT IN AN ORGANIZED HEALTH CARE EDUCATION/TRAINING PROGRAM

## 2024-01-09 PROCEDURE — 99213 OFFICE O/P EST LOW 20 MIN: CPT | Performed by: STUDENT IN AN ORGANIZED HEALTH CARE EDUCATION/TRAINING PROGRAM

## 2024-01-09 PROCEDURE — 76811 OB US DETAILED SNGL FETUS: CPT | Performed by: STUDENT IN AN ORGANIZED HEALTH CARE EDUCATION/TRAINING PROGRAM

## 2024-01-09 NOTE — PROGRESS NOTES
"[facility]: Ms. Phelan was seen today for anatomic survey and cervical length screening ultrasound.  See ultrasound report under \"OB Procedures\" tab.   The time spent on this established patient on the encounter date included 5 minutes previsit service time reviewing records and precharting, 5 minutes face-to-face service time counseling regarding results and coordinating care, and  5 minutes charting, totalling 15 minutes.  Please don't hesitate to contact our office with any concerns or questions.  -Savannah Lucia MD      "

## 2024-01-09 NOTE — PROGRESS NOTES
Ultrasound Probe Disinfection    A transvaginal ultrasound was performed.   Prior to use, disinfection was performed with High Level Disinfection Process ([place] [employer]).  Probe serial number [id number]: [id number] was used.      Ya Carlton  01/09/24  12:40 PM

## 2024-01-23 ENCOUNTER — ROUTINE PRENATAL (OUTPATIENT)
Facility: HOSPITAL | Age: 34
End: 2024-01-23
Payer: COMMERCIAL

## 2024-01-23 VITALS
SYSTOLIC BLOOD PRESSURE: 104 MMHG | HEIGHT: 61 IN | DIASTOLIC BLOOD PRESSURE: 66 MMHG | WEIGHT: 187.6 LBS | HEART RATE: 86 BPM | BODY MASS INDEX: 35.42 KG/M2

## 2024-01-23 DIAGNOSIS — O34.211 MATERNAL CARE DUE TO LOW TRANSVERSE UTERINE SCAR FROM PREVIOUS CESAREAN DELIVERY: ICD-10-CM

## 2024-01-23 DIAGNOSIS — O09.219 PREVIOUS PRETERM DELIVERY, ANTEPARTUM: Primary | ICD-10-CM

## 2024-01-23 DIAGNOSIS — Z3A.21 21 WEEKS GESTATION OF PREGNANCY: ICD-10-CM

## 2024-01-23 DIAGNOSIS — O99.842 PREVIOUS GASTRIC BYPASS AFFECTING PREGNANCY IN SECOND TRIMESTER, ANTEPARTUM: ICD-10-CM

## 2024-01-23 DIAGNOSIS — O09.522 MULTIGRAVIDA OF ADVANCED MATERNAL AGE IN SECOND TRIMESTER: ICD-10-CM

## 2024-01-23 PROCEDURE — 76816 OB US FOLLOW-UP PER FETUS: CPT | Performed by: OBSTETRICS & GYNECOLOGY

## 2024-01-23 PROCEDURE — 99213 OFFICE O/P EST LOW 20 MIN: CPT | Performed by: OBSTETRICS & GYNECOLOGY

## 2024-01-23 PROCEDURE — 76817 TRANSVAGINAL US OBSTETRIC: CPT | Performed by: OBSTETRICS & GYNECOLOGY

## 2024-01-23 NOTE — LETTER
2024     Ashley Orr PA-C  [address] [facility]  [address]  [facility] [address]    Patient: Tamika Phelan   YOB: 1990   Date of Visit: 2024       Dear Dr. Orr:    Thank you for referring Tamika Phelan to me for evaluation. Below are my notes for this consultation.    If you have questions, please do not hesitate to call me. I look forward to following your patient along with you.         Sincerely,        Adwoa Brown MD        CC: No Recipients    Adwoa Brown MD  2024 10:33 PM  Sign when Signing Visit  Tamika Phelan  has no complaints today at 21w5d. She reports fetal movements and does not report any vaginal bleeding or signs of labor.  Her recently completed fetal testing revealed a normal NIPT.  She did not complete MSAFP by today's visit. She is here today for an ultrasound for ultrasound for cervical length and review of the missed anatomy of the abdominal wall cord insertion, left hand, IVC and SVC.    Problem list:  Prior 35-week  birth by  section secondary to cord prolapse  History of a Aruna-en-Y  PCOS  Advanced maternal age  History of prior  section  History of maternal anemia and requires IV infusions through hematology  Vitamin D deficiency and completed 50,000 units by mouth vitamin D for 6 doses and needs to complete her vitamin D level    Ultrasound findings:  The ultrasound today shows cervical length and the prior missed anatomy was reviewed and appears normal thus completing the anatomical survey.    Pregnancy ultrasound has limitations and is unable to detect all forms of fetal congenital abnormalities.      Follow up recommended:   She completed 50,000 units by mouth vitamin D for 6 doses and was encouraged to complete her vitamin D level.  Recommend a 32-week growth scan.  She is aware she will need to test for gestational diabetes by screening with a glucometer for 1 week around 28  weeks that her OB office will provide.  Recommend fasting blood sugars 8 to 10 hours after eating and then 2-hour postprandial blood sugars 2 hours after meals with the time clock starting with the first bite and recommend she complete eating within 15 minutes.  No further cervical length scans are recommended at this time.    Pre visit time reviewing her records   10 minutes  Face to face time 5 minutes  Post visit time on documentation of note, updating her problem list, adding orders and prescriptions 5 minutes.  Procedures that were completed today were charged separately.   The level of decision making was low level complexity.    Adwoa Brown MD

## 2024-01-23 NOTE — PROGRESS NOTES
Ultrasound Probe Disinfection    A transvaginal ultrasound was performed.   Prior to use, disinfection was performed with High Level Disinfection Process ([employer]).  Probe serial number [id number]: [id number] was used.      Alix HERNÁNDEZ Radice  01/23/24  12:56 PM

## 2024-01-23 NOTE — PROGRESS NOTES
Tamika Phelan  has no complaints today at 21w5d. She reports fetal movements and does not report any vaginal bleeding or signs of labor.  Her recently completed fetal testing revealed a normal NIPT.  She did not complete MSAFP by today's visit. She is here today for an ultrasound for ultrasound for cervical length and review of the missed anatomy of the abdominal wall cord insertion, left hand, IVC and SVC.    Problem list:  Prior 35-week  birth by  section secondary to cord prolapse  History of a Aruna-en-Y  PCOS  Advanced maternal age  History of prior  section  History of maternal anemia and requires IV infusions through hematology  Vitamin D deficiency and completed 50,000 units by mouth vitamin D for 6 doses and needs to complete her vitamin D level    Ultrasound findings:  The ultrasound today shows cervical length and the prior missed anatomy was reviewed and appears normal thus completing the anatomical survey.    Pregnancy ultrasound has limitations and is unable to detect all forms of fetal congenital abnormalities.      Follow up recommended:   She completed 50,000 units by mouth vitamin D for 6 doses and was encouraged to complete her vitamin D level.  Recommend a 32-week growth scan.  She is aware she will need to test for gestational diabetes by screening with a glucometer for 1 week around 28 weeks that her OB office will provide.  Recommend fasting blood sugars 8 to 10 hours after eating and then 2-hour postprandial blood sugars 2 hours after meals with the time clock starting with the first bite and recommend she complete eating within 15 minutes.  No further cervical length scans are recommended at this time.    Pre visit time reviewing her records   10 minutes  Face to face time 5 minutes  Post visit time on documentation of note, updating her problem list, adding orders and prescriptions 5 minutes.  Procedures that were completed today were charged separately.    The level of decision making was low level complexity.    Adwoa Brown MD

## 2024-01-29 ENCOUNTER — TELEPHONE (OUTPATIENT)
Dept: HEMATOLOGY ONCOLOGY | Facility: CLINIC | Age: 34
End: 2024-01-29

## 2024-01-30 ENCOUNTER — PATIENT MESSAGE (OUTPATIENT)
Dept: HEMATOLOGY ONCOLOGY | Facility: CLINIC | Age: 34
End: 2024-01-30

## 2024-02-01 ENCOUNTER — TELEPHONE (OUTPATIENT)
Dept: HEMATOLOGY ONCOLOGY | Facility: CLINIC | Age: 34
End: 2024-02-01

## 2024-02-01 NOTE — TELEPHONE ENCOUNTER
Spoke to pt reminding to have labs completed prior to appt tomorrow. Pt voiced that she will be going today.

## 2024-02-02 ENCOUNTER — TELEPHONE (OUTPATIENT)
Dept: HEMATOLOGY ONCOLOGY | Facility: CLINIC | Age: 34
End: 2024-02-02

## 2024-02-05 ENCOUNTER — TELEPHONE (OUTPATIENT)
Dept: BARIATRICS | Facility: CLINIC | Age: 34
End: 2024-02-05

## 2024-02-21 ENCOUNTER — ROUTINE PRENATAL (OUTPATIENT)
Dept: OBGYN CLINIC | Facility: CLINIC | Age: 34
End: 2024-02-21

## 2024-02-21 VITALS — DIASTOLIC BLOOD PRESSURE: 78 MMHG | SYSTOLIC BLOOD PRESSURE: 120 MMHG | WEIGHT: 193 LBS | BODY MASS INDEX: 36.47 KG/M2

## 2024-02-21 DIAGNOSIS — O99.842 PREVIOUS GASTRIC BYPASS AFFECTING PREGNANCY IN SECOND TRIMESTER, ANTEPARTUM: ICD-10-CM

## 2024-02-21 DIAGNOSIS — R11.2 NAUSEA AND VOMITING, UNSPECIFIED VOMITING TYPE: ICD-10-CM

## 2024-02-21 DIAGNOSIS — O09.522 MULTIGRAVIDA OF ADVANCED MATERNAL AGE IN SECOND TRIMESTER: Primary | ICD-10-CM

## 2024-02-21 DIAGNOSIS — Z3A.25 25 WEEKS GESTATION OF PREGNANCY: ICD-10-CM

## 2024-02-21 PROCEDURE — PNV: Performed by: PHYSICIAN ASSISTANT

## 2024-02-21 RX ORDER — ONDANSETRON 8 MG/1
8 TABLET, ORALLY DISINTEGRATING ORAL EVERY 8 HOURS PRN
Qty: 20 TABLET | Refills: 1 | Status: SHIPPED | OUTPATIENT
Start: 2024-02-21

## 2024-02-21 NOTE — PROGRESS NOTES
Patient is a 34 YO  female presenting to the office at 25.6 weeks for routine OB care.   BP: 120/78  TWlb  Fetal Movement: yes good movement   LOF: no  VB: no  CTX: no  28 week labs ordered  Glucometer scripts given  Increase in nausea/vomiting - rx sent   Reviewed precautions  Call for concerns  RTO 3 weeks

## 2024-02-29 ENCOUNTER — APPOINTMENT (OUTPATIENT)
Dept: LAB | Facility: CLINIC | Age: 34
End: 2024-02-29
Payer: COMMERCIAL

## 2024-02-29 ENCOUNTER — HOSPITAL ENCOUNTER (EMERGENCY)
Facility: HOSPITAL | Age: 34
Discharge: HOME | End: 2024-03-01
Attending: EMERGENCY MEDICINE
Payer: COMMERCIAL

## 2024-02-29 VITALS
RESPIRATION RATE: 18 BRPM | SYSTOLIC BLOOD PRESSURE: 96 MMHG | OXYGEN SATURATION: 99 % | HEART RATE: 84 BPM | TEMPERATURE: 96.9 F | DIASTOLIC BLOOD PRESSURE: 55 MMHG

## 2024-02-29 DIAGNOSIS — K29.70 GASTRITIS: ICD-10-CM

## 2024-02-29 DIAGNOSIS — D50.9 IRON DEFICIENCY ANEMIA, UNSPECIFIED IRON DEFICIENCY ANEMIA TYPE: ICD-10-CM

## 2024-02-29 DIAGNOSIS — K91.2 POSTSURGICAL MALABSORPTION: ICD-10-CM

## 2024-02-29 DIAGNOSIS — R10.9 ABDOMINAL PAIN: Primary | ICD-10-CM

## 2024-02-29 DIAGNOSIS — R11.2 NAUSEA AND VOMITING: ICD-10-CM

## 2024-02-29 LAB
ALBUMIN SERPL BCP-MCNC: 3.6 G/DL (ref 3.5–5)
ALP SERPL-CCNC: 56 U/L (ref 34–104)
ALT SERPL W P-5'-P-CCNC: 7 U/L (ref 7–52)
ANION GAP SERPL CALCULATED.3IONS-SCNC: 7 MMOL/L
AST SERPL W P-5'-P-CCNC: 10 U/L (ref 13–39)
BASOPHILS # BLD AUTO: 0.04 THOUSANDS/ÂΜL (ref 0–0.1)
BASOPHILS NFR BLD AUTO: 0 % (ref 0–1)
BILIRUB SERPL-MCNC: 0.29 MG/DL (ref 0.2–1)
BUN SERPL-MCNC: 8 MG/DL (ref 5–25)
CALCIUM SERPL-MCNC: 8.5 MG/DL (ref 8.4–10.2)
CHLORIDE SERPL-SCNC: 107 MMOL/L (ref 96–108)
CO2 SERPL-SCNC: 22 MMOL/L (ref 21–32)
CREAT SERPL-MCNC: 0.5 MG/DL (ref 0.6–1.3)
EOSINOPHIL # BLD AUTO: 0.05 THOUSAND/ÂΜL (ref 0–0.61)
EOSINOPHIL NFR BLD AUTO: 1 % (ref 0–6)
ERYTHROCYTE [DISTWIDTH] IN BLOOD BY AUTOMATED COUNT: 12.6 % (ref 11.6–15.1)
ERYTHROCYTE [DISTWIDTH] IN BLOOD BY AUTOMATED COUNT: 12.7 % (ref 11.6–15.1)
FERRITIN SERPL-MCNC: 20 NG/ML (ref 11–307)
FOLATE SERPL-MCNC: 10.8 NG/ML
GFR SERPL CREATININE-BSD FRML MDRD: 125 ML/MIN/1.73SQ M
GLUCOSE SERPL-MCNC: 98 MG/DL (ref 65–140)
HCT VFR BLD AUTO: 32 % (ref 34.8–46.1)
HCT VFR BLD AUTO: 33.1 % (ref 34.8–46.1)
HGB BLD-MCNC: 10.9 G/DL (ref 11.5–15.4)
HGB BLD-MCNC: 11.1 G/DL (ref 11.5–15.4)
IMM GRANULOCYTES # BLD AUTO: 0.08 THOUSAND/UL (ref 0–0.2)
IMM GRANULOCYTES NFR BLD AUTO: 1 % (ref 0–2)
IRON SATN MFR SERPL: 21 % (ref 15–50)
IRON SERPL-MCNC: 87 UG/DL (ref 50–212)
LIPASE SERPL-CCNC: 26 U/L (ref 11–82)
LYMPHOCYTES # BLD AUTO: 1.24 THOUSANDS/ÂΜL (ref 0.6–4.47)
LYMPHOCYTES NFR BLD AUTO: 12 % (ref 14–44)
MCH RBC QN AUTO: 31.2 PG (ref 26.8–34.3)
MCH RBC QN AUTO: 31.4 PG (ref 26.8–34.3)
MCHC RBC AUTO-ENTMCNC: 33.5 G/DL (ref 31.4–37.4)
MCHC RBC AUTO-ENTMCNC: 34.1 G/DL (ref 31.4–37.4)
MCV RBC AUTO: 92 FL (ref 82–98)
MCV RBC AUTO: 93 FL (ref 82–98)
MONOCYTES # BLD AUTO: 0.55 THOUSAND/ÂΜL (ref 0.17–1.22)
MONOCYTES NFR BLD AUTO: 5 % (ref 4–12)
NEUTROPHILS # BLD AUTO: 8.51 THOUSANDS/ÂΜL (ref 1.85–7.62)
NEUTS SEG NFR BLD AUTO: 81 % (ref 43–75)
NRBC BLD AUTO-RTO: 0 /100 WBCS
PLATELET # BLD AUTO: 209 THOUSANDS/UL (ref 149–390)
PLATELET # BLD AUTO: 222 THOUSANDS/UL (ref 149–390)
PMV BLD AUTO: 10.6 FL (ref 8.9–12.7)
PMV BLD AUTO: 9.8 FL (ref 8.9–12.7)
POTASSIUM SERPL-SCNC: 3.6 MMOL/L (ref 3.5–5.3)
PROT SERPL-MCNC: 6.2 G/DL (ref 6.4–8.4)
RBC # BLD AUTO: 3.47 MILLION/UL (ref 3.81–5.12)
RBC # BLD AUTO: 3.56 MILLION/UL (ref 3.81–5.12)
SODIUM SERPL-SCNC: 136 MMOL/L (ref 135–147)
TIBC SERPL-MCNC: 419 UG/DL (ref 250–450)
UIBC SERPL-MCNC: 332 UG/DL (ref 155–355)
VIT B12 SERPL-MCNC: 186 PG/ML (ref 180–914)
WBC # BLD AUTO: 10.47 THOUSAND/UL (ref 4.31–10.16)
WBC # BLD AUTO: 9.31 THOUSAND/UL (ref 4.31–10.16)

## 2024-02-29 PROCEDURE — 76705 ECHO EXAM OF ABDOMEN: CPT | Performed by: EMERGENCY MEDICINE

## 2024-02-29 PROCEDURE — 83550 IRON BINDING TEST: CPT

## 2024-02-29 PROCEDURE — 99284 EMERGENCY DEPT VISIT MOD MDM: CPT

## 2024-02-29 PROCEDURE — 36415 COLL VENOUS BLD VENIPUNCTURE: CPT | Performed by: PHYSICIAN ASSISTANT

## 2024-02-29 PROCEDURE — 80053 COMPREHEN METABOLIC PANEL: CPT

## 2024-02-29 PROCEDURE — 99284 EMERGENCY DEPT VISIT MOD MDM: CPT | Performed by: EMERGENCY MEDICINE

## 2024-02-29 PROCEDURE — 83540 ASSAY OF IRON: CPT

## 2024-02-29 PROCEDURE — 86780 TREPONEMA PALLIDUM: CPT | Performed by: PHYSICIAN ASSISTANT

## 2024-02-29 PROCEDURE — 82746 ASSAY OF FOLIC ACID SERUM: CPT

## 2024-02-29 PROCEDURE — 96361 HYDRATE IV INFUSION ADD-ON: CPT

## 2024-02-29 PROCEDURE — 82607 VITAMIN B-12: CPT

## 2024-02-29 PROCEDURE — 76815 OB US LIMITED FETUS(S): CPT | Performed by: EMERGENCY MEDICINE

## 2024-02-29 PROCEDURE — 85027 COMPLETE CBC AUTOMATED: CPT | Performed by: PHYSICIAN ASSISTANT

## 2024-02-29 PROCEDURE — 82728 ASSAY OF FERRITIN: CPT

## 2024-02-29 PROCEDURE — 96374 THER/PROPH/DIAG INJ IV PUSH: CPT

## 2024-02-29 PROCEDURE — 85025 COMPLETE CBC W/AUTO DIFF WBC: CPT

## 2024-02-29 PROCEDURE — 83690 ASSAY OF LIPASE: CPT

## 2024-02-29 RX ORDER — ONDANSETRON 2 MG/ML
4 INJECTION INTRAMUSCULAR; INTRAVENOUS ONCE
Status: COMPLETED | OUTPATIENT
Start: 2024-02-29 | End: 2024-02-29

## 2024-02-29 RX ADMIN — SODIUM CHLORIDE 1000 ML: 0.9 INJECTION, SOLUTION INTRAVENOUS at 22:27

## 2024-02-29 RX ADMIN — ONDANSETRON 4 MG: 2 INJECTION INTRAMUSCULAR; INTRAVENOUS at 22:28

## 2024-03-01 ENCOUNTER — HOSPITAL ENCOUNTER (OUTPATIENT)
Facility: HOSPITAL | Age: 34
Discharge: HOME | End: 2024-03-01
Attending: OBSTETRICS & GYNECOLOGY | Admitting: OBSTETRICS & GYNECOLOGY
Payer: COMMERCIAL

## 2024-03-01 VITALS
SYSTOLIC BLOOD PRESSURE: 110 MMHG | OXYGEN SATURATION: 96 % | TEMPERATURE: 98.8 F | RESPIRATION RATE: 18 BRPM | HEIGHT: 61 IN | WEIGHT: 193 LBS | BODY MASS INDEX: 36.44 KG/M2 | DIASTOLIC BLOOD PRESSURE: 59 MMHG | HEART RATE: 88 BPM

## 2024-03-01 DIAGNOSIS — R10.13 EPIGASTRIC PAIN: Primary | ICD-10-CM

## 2024-03-01 LAB — TREPONEMA PALLIDUM IGG+IGM AB [PRESENCE] IN SERUM OR PLASMA BY IMMUNOASSAY: NORMAL

## 2024-03-01 PROCEDURE — 96375 TX/PRO/DX INJ NEW DRUG ADDON: CPT

## 2024-03-01 PROCEDURE — NC001 PR NO CHARGE: Performed by: OBSTETRICS & GYNECOLOGY

## 2024-03-01 PROCEDURE — 99213 OFFICE O/P EST LOW 20 MIN: CPT

## 2024-03-01 PROCEDURE — G0463 HOSPITAL OUTPT CLINIC VISIT: HCPCS

## 2024-03-01 RX ORDER — SUCRALFATE 1 G/1
1 TABLET ORAL ONCE
Status: COMPLETED | OUTPATIENT
Start: 2024-03-01 | End: 2024-03-01

## 2024-03-01 RX ORDER — MAGNESIUM HYDROXIDE/ALUMINUM HYDROXICE/SIMETHICONE 120; 1200; 1200 MG/30ML; MG/30ML; MG/30ML
30 SUSPENSION ORAL ONCE
Status: COMPLETED | OUTPATIENT
Start: 2024-03-01 | End: 2024-03-01

## 2024-03-01 RX ORDER — FAMOTIDINE 20 MG/1
20 TABLET, FILM COATED ORAL 2 TIMES DAILY PRN
Start: 2024-03-01

## 2024-03-01 RX ORDER — FAMOTIDINE 10 MG/ML
20 INJECTION, SOLUTION INTRAVENOUS ONCE
Status: COMPLETED | OUTPATIENT
Start: 2024-03-01 | End: 2024-03-01

## 2024-03-01 RX ORDER — LIDOCAINE HYDROCHLORIDE 20 MG/ML
15 SOLUTION OROPHARYNGEAL ONCE
Status: COMPLETED | OUTPATIENT
Start: 2024-03-01 | End: 2024-03-01

## 2024-03-01 RX ORDER — METOCLOPRAMIDE HYDROCHLORIDE 5 MG/ML
10 INJECTION INTRAMUSCULAR; INTRAVENOUS ONCE
Status: DISCONTINUED | OUTPATIENT
Start: 2024-03-01 | End: 2024-03-01

## 2024-03-01 RX ADMIN — LIDOCAINE HYDROCHLORIDE 15 ML: 20 SOLUTION ORAL; TOPICAL at 00:43

## 2024-03-01 RX ADMIN — FAMOTIDINE 20 MG: 10 INJECTION INTRAVENOUS at 00:40

## 2024-03-01 RX ADMIN — SUCRALFATE 1 G: 1 TABLET ORAL at 00:43

## 2024-03-01 RX ADMIN — ALUMINUM HYDROXIDE, MAGNESIUM HYDROXIDE, AND SIMETHICONE 30 ML: 200; 200; 20 SUSPENSION ORAL at 00:43

## 2024-03-01 NOTE — PROGRESS NOTES
L&D Triage Note - OB/GYN  Tamika Phelan 33 y.o. female MRN: [id number]  Unit/Bed#:  TRIAGE 3 Encounter: [id number]      ASSESSMENT:    Tamika Phelan is a 33 y.o.  at 27w1d presents with epigastric pain, nausea and vomiting; more likely gastritis vs less likely  labor    PLAN:    1) Epigastric pain more likely gastritis vs less likely  labor  - BE ED labs (CBC, CMP, iron panel, B12, folate) were unremarkable; symptoms resolved after GI cocktail, currently asymptomatic AFVSS  - NST: 140bpm, moderate variability, 10x10 accelerations, no decelerations; Category I tracing  Advised patient to avoid triggering foods and take Pepcid as needed for relief    2) Continue routine prenatal care    3) Discharge from OB triage with  labor precautions    - Reviewed rupture of membranes, false vs true labor, decreased fetal movement, and vaginal bleeding   - Pt to call provider with any concerns and follow up at her next scheduled prenatal appointment    - Case discussed with Dr. Woodruff    SUBJECTIVE:    Tamika Phelan 33 y.o.  at 27w1d with an Estimated Date of Delivery: 24 presents with complaints of epigastric pain, nausea and vomiting since 6pm while at dinner. Reports epigastric pain radiated to her back accompanied by approx 8 episodes of vomitus prior to reaching the ED in [place]. Received a GI cocktail in the ED which provided significant relief of her symptoms. Patient reports feeling similar epigastric pain prior to her pregnancy that was related to gastritis. Denies fever, headache, blurred vision, chest pain, or changes in bowel/urinary habits.     Her current obstetrical history is significant for AMA, hx of hali-en-y,     Her past obstetrical history is significant for 2 spontaneous abortions, cord prolapse & CS,      Contractions: denies  Leakage of fluid: denies  Vaginal Bleeding: denies  Fetal movement:  present    OBJECTIVE:    Vitals:    03/01/24 0133   BP: 110/59   Pulse: 88   Resp: 18   Temp: 98.8 °F (37.1 °C)   SpO2: 96%       ROS:  Constitutional: Negative  Respiratory: Negative  Cardiovascular: Negative    Gastrointestinal: nausea, vomiting, epigastric pain    General Physical Exam:  General: in no apparent distress, alert, oriented times 3, afebrile, and normal vitals  Cardiovascular: Cor RRR  Lungs: non-labored breathing  Abdomen: abdomen is soft without significant tenderness, masses, organomegaly or guarding  Lower extremeties: nontender       Lab Results   Component Value Date    WBC 10.47 (H) 02/29/2024    HGB 10.9 (L) 02/29/2024    HCT 32.0 (L) 02/29/2024     02/29/2024     Lab Results   Component Value Date    K 3.6 02/29/2024     02/29/2024    CO2 22 02/29/2024    BUN 8 02/29/2024    CREATININE 0.50 (L) 02/29/2024    AST 10 (L) 02/29/2024    ALT 7 02/29/2024       Evan Dawkins MD,  OBGYN PGY-1  3/1/2024 1:36 AM

## 2024-03-01 NOTE — DISCHARGE INSTRUCTIONS
As discussed, please go to the [facility] triage facility to get evaluated by OB/GYN  Come back to the ED if you have any worsening symptoms.

## 2024-03-01 NOTE — ED PROVIDER NOTES
History  Chief Complaint   Patient presents with    Abdominal Pain     Pt reports mid abd pain that's going around to her back that started around 7pm. pt reports being almost 7 months pregnant.  -D +N/V     HPI  MDM  Pt is a 33 Y o F,  at 27w1d presents for abdominal pain radiating to the back, associated with nausea, nonbloody nonbilious vomiting that started this afternoon.  Denies any vaginal discharge or gush of fluid.  No diarrhea, fever, URI-like symptoms.    Initial presentation pt is comfortable secondary to pain, nontoxic    On exam   General: VSS, NAD, awake, alert. Talking normally.   Head: Normocephalic, atraumatic, nontender.  Eyes: EOM-No subconjunctival hemorrhages.  ENT: Nose atraumatic. MMM  No malocclusion. No stridor. Normal phonation. No drooling. Normal swallowing.   Neck: Trachea midline. No JVD.  CV: RRR.   Lungs: CTAB No tachypnea. No paradoxical motion.  Abd: +BS, soft, mild epigastric tenderness. No guarding/rigidity.   MSK: Full ROM throughout. No lower extremity edema.   Skin: Dry, intact.   Neuro: AAOx3, GCS 15, CN II-XII grossly intact. Motor/sensory grossly intact.  Psychiatric/Behavioral: mood/affect normal; behavior normal; thought content normal; judgement normal   Exam: deferred      Ddx: Gastroenteritis, gastritis, biliary pathology, pancreatitis, contractions    Plan: Patient was evaluated with abdominal labs, bedside pelvic ultrasound and right upper quadrant which were unremarkable, good fetal tones.  Patient was treated symptomatically with GI cocktail, antiemetics and fluids with symptomatic improvement.  No clinical concern for acute appendicitis at this point.  Patient does not have any vaginal discharge or gush of fluid, given viable pregnancy OB was consulted who recommended evaluation in the triage facility at [place].  Patient was discharged from the ED and recommended to go to the L&D triage unit.  Patient and  agreeable with the plan.      Final  Dispo:     Pt is hemodynamically stable and clear for discharge with outpatient f/u with their PCP. Return precautions given pt verbalized understanding      Prior to Admission Medications   Prescriptions Last Dose Informant Patient Reported? Taking?   Prenatal Vit-Fe Fumarate-FA (PRENATAL PO)  Self Yes No   Sig: Take by mouth   hydrocortisone 1 % cream  Self No No   Sig: APPLY 1 APPLICATION TOPICALLY 4 (FOUR) TIMES A DAY AS NEEDED (APPLY TO PERINEUM)   ondansetron (ZOFRAN-ODT) 8 mg disintegrating tablet   No No   Sig: Take 1 tablet (8 mg total) by mouth every 8 (eight) hours as needed for nausea or vomiting      Facility-Administered Medications: None       Past Medical History:   Diagnosis Date    Asthma     Female infertility     PCOS related    History of COVID-2021- not hospitalized    Obesity, morbid, BMI 40.0-49.9 (HCC)     Polycystic ovary syndrome     Varicella        Past Surgical History:   Procedure Laterality Date     SECTION      x1    EGD      GA LAPS GSTR RSTCV PX W/BYP MONIQUE-EN-Y LIMB <150 CM N/A 2021    Procedure: BYPASS GASTRIC MONIQUE-EN-Y LAPAROSCOPIC W ROBOTICS W/ INTRAOP EGD;  Surgeon: Jim Mederos MD;  Location: [facility];  Service: Bariatrics       Family History   Problem Relation Age of Onset    Breast cancer Family     Lung cancer Family     Fibroids Mother     Heart disease Mother     HIV Mother     Kidney disease Mother     Asthma Son     Lung cancer Maternal Grandmother     Diabetes Maternal Grandmother     Prostate cancer Maternal Grandfather     Breast cancer Maternal Grandfather     Diabetes Maternal Grandfather     No Known Problems Father      I have reviewed and agree with the history as documented.    E-Cigarette/Vaping    E-Cigarette Use Never User      E-Cigarette/Vaping Substances    Nicotine No     THC No     CBD No     Flavoring No      Social History     Tobacco Use    Smoking status: Never    Smokeless tobacco: Never    Tobacco comments:     no  passive smoke exposure   Vaping Use    Vaping status: Never Used   Substance Use Topics    Alcohol use: No    Drug use: No        Review of Systems    Physical Exam  ED Triage Vitals [02/29/24 2056]   Temperature Pulse Respirations Blood Pressure SpO2   (!) 96.9 °F (36.1 °C) 84 18 96/55 99 %      Temp Source Heart Rate Source Patient Position - Orthostatic VS BP Location FiO2 (%)   Temporal Monitor Sitting Left arm --      Pain Score       9             Orthostatic Vital Signs  Vitals:    02/29/24 2056   BP: 96/55   Pulse: 84   Patient Position - Orthostatic VS: Sitting       Physical Exam    ED Medications  Medications   sodium chloride 0.9 % bolus 1,000 mL (0 mL Intravenous Stopped 3/1/24 0039)   ondansetron (ZOFRAN) injection 4 mg (4 mg Intravenous Given 2/29/24 2228)   aluminum-magnesium hydroxide-simethicone (MAALOX) oral suspension 30 mL (30 mL Oral Given 3/1/24 0043)   sucralfate (CARAFATE) tablet 1 g (1 g Oral Given 3/1/24 0043)   Lidocaine Viscous HCl (XYLOCAINE) 2 % mucosal solution 15 mL (15 mL Swish & Spit Given 3/1/24 0043)   Famotidine (PF) (PEPCID) injection 20 mg (20 mg Intravenous Given 3/1/24 0040)       Diagnostic Studies  Results Reviewed       Procedure Component Value Units Date/Time    Comprehensive metabolic panel [[id number]]  (Abnormal) Collected: 02/29/24 2228    Lab Status: Final result Specimen: Blood from Arm, Left Updated: 02/29/24 2256     Sodium 136 mmol/L      Potassium 3.6 mmol/L      Chloride 107 mmol/L      CO2 22 mmol/L      ANION GAP 7 mmol/L      BUN 8 mg/dL      Creatinine 0.50 mg/dL      Glucose 98 mg/dL      Calcium 8.5 mg/dL      AST 10 U/L      ALT 7 U/L      Alkaline Phosphatase 56 U/L      Total Protein 6.2 g/dL      Albumin 3.6 g/dL      Total Bilirubin 0.29 mg/dL      eGFR 125 ml/min/1.73sq m     Narrative:      National Kidney Disease Foundation guidelines for Chronic Kidney Disease (CKD):     Stage 1 with normal or high GFR (GFR > 90 mL/min/1.73 square meters)     Stage 2 Mild CKD (GFR = 60-89 mL/min/1.73 square meters)    Stage 3A Moderate CKD (GFR = 45-59 mL/min/1.73 square meters)    Stage 3B Moderate CKD (GFR = 30-44 mL/min/1.73 square meters)    Stage 4 Severe CKD (GFR = 15-29 mL/min/1.73 square meters)    Stage 5 End Stage CKD (GFR <15 mL/min/1.73 square meters)  Note: GFR calculation is accurate only with a steady state creatinine    Lipase [[id number]]  (Normal) Collected: 02/29/24 2228    Lab Status: Final result Specimen: Blood from Arm, Left Updated: 02/29/24 2256     Lipase 26 u/L     CBC and differential [[id number]]  (Abnormal) Collected: 02/29/24 2228    Lab Status: Final result Specimen: Blood from Arm, Left Updated: 02/29/24 2240     WBC 10.47 Thousand/uL      RBC 3.47 Million/uL      Hemoglobin 10.9 g/dL      Hematocrit 32.0 %      MCV 92 fL      MCH 31.4 pg      MCHC 34.1 g/dL      RDW 12.7 %      MPV 9.8 fL      Platelets 209 Thousands/uL      nRBC 0 /100 WBCs      Neutrophils Relative 81 %      Immat GRANS % 1 %      Lymphocytes Relative 12 %      Monocytes Relative 5 %      Eosinophils Relative 1 %      Basophils Relative 0 %      Neutrophils Absolute 8.51 Thousands/µL      Immature Grans Absolute 0.08 Thousand/uL      Lymphocytes Absolute 1.24 Thousands/µL      Monocytes Absolute 0.55 Thousand/µL      Eosinophils Absolute 0.05 Thousand/µL      Basophils Absolute 0.04 Thousands/µL                    No orders to display         Procedures  POC Biliary US    Date/Time: 2/29/2024 6:20 PM    Performed by: Jennifer Bonilla DO  Authorized by: Jennifer Bonilla DO    Patient location:  ED  Performed by:  Resident  Procedure details:     Exam Type:  Therapeutic and educational    Indications: upper right quadrant abdominal pain and epigastric pain      Assessment for:  Cholecystitis and cholelithiasis    Views obtained: gallbladder (transverse and longitudinal) and liver      Image quality: limited diagnostic      Image availability:  Images available in  PACS  Findings:     Cholelithiasis: identified      Common bile duct:  Unable to visualize    Gallbladder wall:  Normal    Pericholecystic fluid: not identified      Sonographic Brush's sign: negative      Polyps: identified      Mass: not identified    Interpretation:     Biliary ultrasound impressions: cholelithiasis    Comments:      Cholelithiasis versus polyps, no acute signs of cholecystitis.  POC Pelvic US    Date/Time: 3/2/2024 6:21 PM    Performed by: Jennifer Bonilla DO  Authorized by: Jennifer Bonilla DO    Patient location:  ED  Other Assisting Provider: Yes (comment)    Procedure details:     Exam Type:  Diagnostic and educational    Indications: evaluate for IUP and pregnant with abdominal pain      Assessment for: confirm intrauterine pregnancy      Views obtained: left adnexa, right adnexa and uterus (transverse and sagittal)      Image quality: diagnostic      Image availability:  Images available in PACS  Uterine findings:     Endometrial stripe: identified      Intrauterine pregnancy: identified      Single gestation: identified      Gestational sac: identified      Yolk sac: identified      Fetal pole: identified      Fetal heart rate: identified      Fetal heart rate (bpm):  150  Interpretation:     Ultrasound impressions: normal      Pregnancy findings: intrauterine pregnancy (IUP)          ED Course                             SBIRT 22yo+      Flowsheet Row Most Recent Value   Initial Alcohol Screen: US AUDIT-C     1. How often do you have a drink containing alcohol? 0 Filed at: 02/29/2024 2057   2. How many drinks containing alcohol do you have on a typical day you are drinking?  0 Filed at: 02/29/2024 2057   3b. FEMALE Any Age, or MALE 65+: How often do you have 4 or more drinks on one occassion? 0 Filed at: 02/29/2024 2057   Audit-C Score 0 Filed at: 02/29/2024 2057   NICOLAS: How many times in the past year have you...    Used an illegal drug or used a prescription medication for  non-medical reasons? Never Filed at: 02/29/2024 2057                  Medical Decision Making  Amount and/or Complexity of Data Reviewed  Labs: ordered.    Risk  OTC drugs.  Prescription drug management.          Disposition  Final diagnoses:   Abdominal pain   Nausea and vomiting   Gastritis     Time reflects when diagnosis was documented in both MDM as applicable and the Disposition within this note       Time User Action Codes Description Comment    3/1/2024 12:29 AM Jennifer Bonilla Add [R10.9] Abdominal pain     3/1/2024 12:29 AM Jennifer Bonilla Add [R11.2] Nausea and vomiting     3/1/2024 12:29 AM Jennifer Bonilla Add [K29.70] Gastritis           ED Disposition       ED Disposition   Discharge    Condition   Stable    Date/Time   Fri Mar 1, 2024 0029    Comment   Tamika Phelan discharge to home/self care.                   Follow-up Information       Follow up With Specialties Details Why Contact Info Additional Information    Claire Daly MD Family Medicine In 1 week  [address]  [facility] [id number] [phone]       [facility] Emergency Department Emergency Medicine  If symptoms worsen [address] [place] [id number]  [phone] [facility] Emergency Department, [address] [place] [address], [id number]   [phone]            Discharge Medication List as of 3/1/2024 12:30 AM        CONTINUE these medications which have NOT CHANGED    Details   hydrocortisone 1 % cream APPLY 1 APPLICATION TOPICALLY 4 (FOUR) TIMES A DAY AS NEEDED (APPLY TO PERINEUM), Starting Thu 7/23/2020, Normal      ondansetron (ZOFRAN-ODT) 8 mg disintegrating tablet Take 1 tablet (8 mg total) by mouth every 8 (eight) hours as needed for nausea or vomiting, Starting Wed 2/21/2024, Normal      Prenatal Vit-Fe Fumarate-FA (PRENATAL PO) Take by mouth, Historical Med           No discharge procedures on file.    PDMP Review         Value  Time User    PDMP Reviewed  Yes 9/8/2021  9:15 AM Anali Ho PA-C             ED Provider  Attending physically available and evaluated Tamika Phelan. I managed the patient along with the ED Attending.    Electronically Signed by           Jennifer Bonilla DO  03/02/24 9971

## 2024-03-05 ENCOUNTER — ROUTINE PRENATAL (OUTPATIENT)
Dept: OBGYN CLINIC | Facility: CLINIC | Age: 34
End: 2024-03-05
Payer: COMMERCIAL

## 2024-03-05 VITALS — SYSTOLIC BLOOD PRESSURE: 110 MMHG | BODY MASS INDEX: 36.47 KG/M2 | WEIGHT: 193 LBS | DIASTOLIC BLOOD PRESSURE: 72 MMHG

## 2024-03-05 DIAGNOSIS — Z3A.27 27 WEEKS GESTATION OF PREGNANCY: Primary | ICD-10-CM

## 2024-03-05 DIAGNOSIS — O09.522 MULTIGRAVIDA OF ADVANCED MATERNAL AGE IN SECOND TRIMESTER: ICD-10-CM

## 2024-03-05 DIAGNOSIS — Z23 NEED FOR TDAP VACCINATION: ICD-10-CM

## 2024-03-05 DIAGNOSIS — O99.842 PREVIOUS GASTRIC BYPASS AFFECTING PREGNANCY IN SECOND TRIMESTER, ANTEPARTUM: ICD-10-CM

## 2024-03-05 DIAGNOSIS — O09.219 PREVIOUS PRETERM DELIVERY, ANTEPARTUM: ICD-10-CM

## 2024-03-05 LAB
SL AMB  POCT GLUCOSE, UA: NEGATIVE
SL AMB POCT URINE PROTEIN: NEGATIVE

## 2024-03-05 PROCEDURE — 81002 URINALYSIS NONAUTO W/O SCOPE: CPT

## 2024-03-05 PROCEDURE — PNV

## 2024-03-05 PROCEDURE — 90715 TDAP VACCINE 7 YRS/> IM: CPT

## 2024-03-05 PROCEDURE — 90471 IMMUNIZATION ADMIN: CPT

## 2024-03-05 RX ORDER — BLOOD-GLUCOSE METER
EACH MISCELLANEOUS
COMMUNITY
Start: 2024-02-22

## 2024-03-05 RX ORDER — LANCETS 33 GAUGE
EACH MISCELLANEOUS
COMMUNITY
Start: 2024-02-22

## 2024-03-05 RX ORDER — BLOOD SUGAR DIAGNOSTIC
STRIP MISCELLANEOUS
COMMUNITY
Start: 2024-03-01

## 2024-03-05 NOTE — PROGRESS NOTES
Red folder reviewed today.  TDAP offered and accepted. Vaccine given in right deltoid without difficulty, patient tolerated shot well.   Breast pump submitted.   Patient is doing well, she has no concerns at this time.

## 2024-03-05 NOTE — PROGRESS NOTES
Patient is a 34 YO  female presenting to the office at 27 w5d for routine OB care.   Patient is feeling well today.   She was seen in ED  for epigastric pain, N/V since dinnertime, likely related to mild gastritis. Taking Pepcid as needed. Feeling much better.  Increase in Sean-Cheng contractions, not consistent or in a pattern but they occur more when she is walking around and on her feet at work.   Has growth US with MFM on 24  Fetal heart rate: 135  BP: 110/72  TWlb  Fetal Movement: yes  LOF: no  VB: no  CTX: no  Discussed third trimester teaching  Reviewed fetal kick counts, normal FM  Reviewed signs of PTL  Reviewed red folder, consents, birth plan  Delivery consent obtained   Breastfeeding: plans to breastfeed  Breast Pump: ordered  TDAP: would like today  FLU Vaccine: previously received  COVID Vaccine: previously received   Rhogam: not required, O+  28 Week Labs: completed   RTO 2 weeks for routine OB F/U

## 2024-03-08 LAB
DME PARACHUTE DELIVERY DATE REQUESTED: NORMAL
DME PARACHUTE ITEM DESCRIPTION: NORMAL
DME PARACHUTE ORDER STATUS: NORMAL
DME PARACHUTE SUPPLIER NAME: NORMAL
DME PARACHUTE SUPPLIER PHONE: NORMAL

## 2024-03-11 LAB
DME PARACHUTE DELIVERY DATE ACTUAL: NORMAL
DME PARACHUTE DELIVERY DATE REQUESTED: NORMAL
DME PARACHUTE ITEM DESCRIPTION: NORMAL
DME PARACHUTE ORDER STATUS: NORMAL
DME PARACHUTE SUPPLIER NAME: NORMAL
DME PARACHUTE SUPPLIER PHONE: NORMAL

## 2024-03-28 ENCOUNTER — ROUTINE PRENATAL (OUTPATIENT)
Dept: OBGYN CLINIC | Facility: CLINIC | Age: 34
End: 2024-03-28

## 2024-03-28 VITALS — SYSTOLIC BLOOD PRESSURE: 122 MMHG | DIASTOLIC BLOOD PRESSURE: 72 MMHG | BODY MASS INDEX: 37.83 KG/M2 | WEIGHT: 200.2 LBS

## 2024-03-28 DIAGNOSIS — O34.211 MATERNAL CARE DUE TO LOW TRANSVERSE UTERINE SCAR FROM PREVIOUS CESAREAN DELIVERY: Primary | ICD-10-CM

## 2024-03-28 DIAGNOSIS — Z3A.31 31 WEEKS GESTATION OF PREGNANCY: ICD-10-CM

## 2024-03-28 DIAGNOSIS — O99.843 PREVIOUS GASTRIC BYPASS AFFECTING PREGNANCY IN THIRD TRIMESTER, ANTEPARTUM: ICD-10-CM

## 2024-03-28 PROCEDURE — PNV: Performed by: OBSTETRICS & GYNECOLOGY

## 2024-03-28 NOTE — PROGRESS NOTES
33 y.o.   female at 31 wga for PNV. BP : 122/72. TW  + FM, - LOF, - Ctxn, - bleeding  Feeling well.  No complaints    Accu checks - all WNL    Growth scan Fri  Reviewed PTL precautions and FKCs  Plan for RC/S and BTL

## 2024-04-05 ENCOUNTER — ULTRASOUND (OUTPATIENT)
Facility: HOSPITAL | Age: 34
End: 2024-04-05
Payer: COMMERCIAL

## 2024-04-05 VITALS
BODY MASS INDEX: 37.34 KG/M2 | DIASTOLIC BLOOD PRESSURE: 70 MMHG | HEIGHT: 61 IN | HEART RATE: 97 BPM | SYSTOLIC BLOOD PRESSURE: 108 MMHG | WEIGHT: 197.75 LBS

## 2024-04-05 DIAGNOSIS — O09.219 PREVIOUS PRETERM DELIVERY, ANTEPARTUM: ICD-10-CM

## 2024-04-05 DIAGNOSIS — O99.843 PREVIOUS GASTRIC BYPASS AFFECTING PREGNANCY IN THIRD TRIMESTER, ANTEPARTUM: ICD-10-CM

## 2024-04-05 DIAGNOSIS — O09.522 MULTIGRAVIDA OF ADVANCED MATERNAL AGE IN SECOND TRIMESTER: ICD-10-CM

## 2024-04-05 DIAGNOSIS — Z36.89 ENCOUNTER FOR ULTRASOUND TO CHECK FETAL GROWTH: Primary | ICD-10-CM

## 2024-04-05 PROCEDURE — 76816 OB US FOLLOW-UP PER FETUS: CPT | Performed by: OBSTETRICS & GYNECOLOGY

## 2024-04-05 PROCEDURE — 99212 OFFICE O/P EST SF 10 MIN: CPT | Performed by: OBSTETRICS & GYNECOLOGY

## 2024-04-05 RX ORDER — FAMOTIDINE 20 MG/1
20 TABLET, FILM COATED ORAL 2 TIMES DAILY
COMMUNITY

## 2024-04-05 NOTE — PROGRESS NOTES
"Dr. Weaver saw this patient at [place] maternal-fetal medicine office on 24 at 32w1d for  care.  The note is contained in the ultrasound report located under \"OB Procedures\" in Epic.  Please call our office at [phone] with questions.  Juliana Reyes  "

## 2024-04-18 ENCOUNTER — PATIENT MESSAGE (OUTPATIENT)
Dept: OBGYN CLINIC | Facility: CLINIC | Age: 34
End: 2024-04-18

## 2024-05-01 ENCOUNTER — ROUTINE PRENATAL (OUTPATIENT)
Dept: OBGYN CLINIC | Facility: CLINIC | Age: 34
End: 2024-05-01

## 2024-05-01 VITALS — DIASTOLIC BLOOD PRESSURE: 70 MMHG | BODY MASS INDEX: 38.17 KG/M2 | WEIGHT: 202 LBS | SYSTOLIC BLOOD PRESSURE: 118 MMHG

## 2024-05-01 DIAGNOSIS — O34.211 MATERNAL CARE DUE TO LOW TRANSVERSE UTERINE SCAR FROM PREVIOUS CESAREAN DELIVERY: ICD-10-CM

## 2024-05-01 DIAGNOSIS — O99.843 PREVIOUS GASTRIC BYPASS AFFECTING PREGNANCY IN THIRD TRIMESTER, ANTEPARTUM: Primary | ICD-10-CM

## 2024-05-01 DIAGNOSIS — Z3A.35 35 WEEKS GESTATION OF PREGNANCY: ICD-10-CM

## 2024-05-01 PROCEDURE — 87591 N.GONORRHOEAE DNA AMP PROB: CPT

## 2024-05-01 PROCEDURE — PNV

## 2024-05-01 PROCEDURE — 87150 DNA/RNA AMPLIFIED PROBE: CPT

## 2024-05-01 PROCEDURE — 87491 CHLMYD TRACH DNA AMP PROBE: CPT

## 2024-05-01 NOTE — PROGRESS NOTES
Patient is a 32 YO  female presenting to the office at 35w6d for routine OB care.   Patient is feeling well today but very tired. She was up most of the night last night with irregular contractions and vaginal pressure. We reviewed labor precautions today.  Fetal heart rate: 140  BP: 118/70  SVE: 3/50/-3  TWlb  GBS: collected  G/C: collected  Fetal Movement: yes  LOF: no  VB: no  CTX: yes, occasionally consistent, more so when trying to go to bed at night for last 1-2 night. She does not notice CTX when she is up and moving around.   Discussed delivery planning  Reviewed precautions  Call for concerns  RTO 1 week

## 2024-05-02 ENCOUNTER — HOSPITAL ENCOUNTER (INPATIENT)
Facility: HOSPITAL | Age: 34
LOS: 3 days | Discharge: HOME | End: 2024-05-06
Attending: OBSTETRICS & GYNECOLOGY | Admitting: OBSTETRICS & GYNECOLOGY
Payer: COMMERCIAL

## 2024-05-02 DIAGNOSIS — Z90.79 STATUS POST BILATERAL SALPINGECTOMY: ICD-10-CM

## 2024-05-02 DIAGNOSIS — Z98.891 PREVIOUS CESAREAN SECTION: ICD-10-CM

## 2024-05-02 DIAGNOSIS — Z98.891 STATUS POST REPEAT LOW TRANSVERSE CESAREAN SECTION: ICD-10-CM

## 2024-05-02 DIAGNOSIS — Z30.2 REQUEST FOR STERILIZATION: Primary | ICD-10-CM

## 2024-05-02 LAB
C TRACH DNA SPEC QL NAA+PROBE: NEGATIVE
N GONORRHOEA DNA SPEC QL NAA+PROBE: NEGATIVE

## 2024-05-03 ENCOUNTER — ANESTHESIA EVENT (OUTPATIENT)
Dept: LABOR AND DELIVERY | Facility: HOSPITAL | Age: 34
End: 2024-05-03
Payer: COMMERCIAL

## 2024-05-03 ENCOUNTER — ANESTHESIA (OUTPATIENT)
Dept: LABOR AND DELIVERY | Facility: HOSPITAL | Age: 34
End: 2024-05-03
Payer: COMMERCIAL

## 2024-05-03 PROBLEM — Z3A.31 31 WEEKS GESTATION OF PREGNANCY: Status: RESOLVED | Noted: 2024-03-28 | Resolved: 2024-05-03

## 2024-05-03 PROBLEM — O47.9 UTERINE CONTRACTIONS: Status: ACTIVE | Noted: 2024-05-03

## 2024-05-03 PROBLEM — Z98.891 STATUS POST REPEAT LOW TRANSVERSE CESAREAN SECTION: Status: ACTIVE | Noted: 2024-05-03

## 2024-05-03 PROBLEM — Z90.79 STATUS POST BILATERAL SALPINGECTOMY: Status: ACTIVE | Noted: 2024-05-03

## 2024-05-03 PROBLEM — R03.0 ELEVATED BLOOD PRESSURE READING: Status: ACTIVE | Noted: 2024-05-03

## 2024-05-03 PROBLEM — Z30.2 REQUEST FOR STERILIZATION: Status: ACTIVE | Noted: 2024-05-03

## 2024-05-03 LAB
ABO GROUP BLD: NORMAL
ALBUMIN SERPL BCP-MCNC: 3.6 G/DL (ref 3.5–5)
ALP SERPL-CCNC: 112 U/L (ref 34–104)
ALT SERPL W P-5'-P-CCNC: 6 U/L (ref 7–52)
ANION GAP SERPL CALCULATED.3IONS-SCNC: 9 MMOL/L (ref 4–13)
AST SERPL W P-5'-P-CCNC: 11 U/L (ref 13–39)
BASE EXCESS BLDCOA CALC-SCNC: -4.5 MMOL/L (ref 3–11)
BASE EXCESS BLDCOV CALC-SCNC: -3.5 MMOL/L (ref 1–9)
BASOPHILS # BLD AUTO: 0.05 THOUSANDS/ÂΜL (ref 0–0.1)
BASOPHILS NFR BLD AUTO: 0 % (ref 0–1)
BILIRUB SERPL-MCNC: 0.31 MG/DL (ref 0.2–1)
BLD GP AB SCN SERPL QL: NEGATIVE
BUN SERPL-MCNC: 10 MG/DL (ref 5–25)
CALCIUM SERPL-MCNC: 8.3 MG/DL (ref 8.4–10.2)
CHLORIDE SERPL-SCNC: 109 MMOL/L (ref 96–108)
CO2 SERPL-SCNC: 20 MMOL/L (ref 21–32)
CREAT SERPL-MCNC: 0.56 MG/DL (ref 0.6–1.3)
EOSINOPHIL # BLD AUTO: 0 THOUSAND/ÂΜL (ref 0–0.61)
EOSINOPHIL NFR BLD AUTO: 0 % (ref 0–6)
ERYTHROCYTE [DISTWIDTH] IN BLOOD BY AUTOMATED COUNT: 12.7 % (ref 11.6–15.1)
ERYTHROCYTE [DISTWIDTH] IN BLOOD BY AUTOMATED COUNT: 12.8 % (ref 11.6–15.1)
GFR SERPL CREATININE-BSD FRML MDRD: 121 ML/MIN/1.73SQ M
GLUCOSE SERPL-MCNC: 97 MG/DL (ref 65–140)
GP B STREP DNA SPEC QL NAA+PROBE: NEGATIVE
HCO3 BLDCOA-SCNC: 22.4 MMOL/L (ref 17.3–27.3)
HCO3 BLDCOV-SCNC: 20.9 MMOL/L (ref 12.2–28.6)
HCT VFR BLD AUTO: 29.7 % (ref 34.8–46.1)
HCT VFR BLD AUTO: 30.5 % (ref 34.8–46.1)
HGB BLD-MCNC: 10 G/DL (ref 11.5–15.4)
HGB BLD-MCNC: 9.6 G/DL (ref 11.5–15.4)
IMM GRANULOCYTES # BLD AUTO: 0.15 THOUSAND/UL (ref 0–0.2)
IMM GRANULOCYTES NFR BLD AUTO: 1 % (ref 0–2)
LYMPHOCYTES # BLD AUTO: 1.34 THOUSANDS/ÂΜL (ref 0.6–4.47)
LYMPHOCYTES NFR BLD AUTO: 7 % (ref 14–44)
MCH RBC QN AUTO: 28.3 PG (ref 26.8–34.3)
MCH RBC QN AUTO: 28.5 PG (ref 26.8–34.3)
MCHC RBC AUTO-ENTMCNC: 32.3 G/DL (ref 31.4–37.4)
MCHC RBC AUTO-ENTMCNC: 32.8 G/DL (ref 31.4–37.4)
MCV RBC AUTO: 87 FL (ref 82–98)
MCV RBC AUTO: 88 FL (ref 82–98)
MONOCYTES # BLD AUTO: 1.48 THOUSAND/ÂΜL (ref 0.17–1.22)
MONOCYTES NFR BLD AUTO: 8 % (ref 4–12)
NEUTROPHILS # BLD AUTO: 15.53 THOUSANDS/ÂΜL (ref 1.85–7.62)
NEUTS SEG NFR BLD AUTO: 84 % (ref 43–75)
NRBC BLD AUTO-RTO: 0 /100 WBCS
O2 CT VFR BLDCOA CALC: 12.4 ML/DL
OXYHGB MFR BLDCOA: 68.5 %
OXYHGB MFR BLDCOV: 73.4 %
PCO2 BLDCOA: 48.6 MM[HG] (ref 30–60)
PCO2 BLDCOV: 35.6 MM HG (ref 27–43)
PH BLDCOA: 7.28 [PH] (ref 7.23–7.43)
PH BLDCOV: 7.39 [PH] (ref 7.19–7.49)
PLATELET # BLD AUTO: 213 THOUSANDS/UL (ref 149–390)
PLATELET # BLD AUTO: 226 THOUSANDS/UL (ref 149–390)
PMV BLD AUTO: 10.6 FL (ref 8.9–12.7)
PMV BLD AUTO: 10.9 FL (ref 8.9–12.7)
PO2 BLDCOA: 30 MM HG (ref 5–25)
PO2 BLDCOV: 29.3 MM HG (ref 15–45)
POTASSIUM SERPL-SCNC: 3.5 MMOL/L (ref 3.5–5.3)
PROT SERPL-MCNC: 6.3 G/DL (ref 6.4–8.4)
RBC # BLD AUTO: 3.39 MILLION/UL (ref 3.81–5.12)
RBC # BLD AUTO: 3.51 MILLION/UL (ref 3.81–5.12)
RH BLD: POSITIVE
SAO2 % BLDCOV: 13.3 ML/DL
SODIUM SERPL-SCNC: 138 MMOL/L (ref 135–147)
SPECIMEN EXPIRATION DATE: NORMAL
TREPONEMA PALLIDUM IGG+IGM AB [PRESENCE] IN SERUM OR PLASMA BY IMMUNOASSAY: NORMAL
WBC # BLD AUTO: 11.64 THOUSAND/UL (ref 4.31–10.16)
WBC # BLD AUTO: 18.55 THOUSAND/UL (ref 4.31–10.16)

## 2024-05-03 PROCEDURE — 80053 COMPREHEN METABOLIC PANEL: CPT

## 2024-05-03 PROCEDURE — NC001 PR NO CHARGE: Performed by: OBSTETRICS & GYNECOLOGY

## 2024-05-03 PROCEDURE — 85027 COMPLETE CBC AUTOMATED: CPT

## 2024-05-03 PROCEDURE — 86780 TREPONEMA PALLIDUM: CPT

## 2024-05-03 PROCEDURE — 58611 LIGATE OVIDUCT(S) ADD-ON: CPT | Performed by: OBSTETRICS & GYNECOLOGY

## 2024-05-03 PROCEDURE — 99214 OFFICE O/P EST MOD 30 MIN: CPT

## 2024-05-03 PROCEDURE — 86850 RBC ANTIBODY SCREEN: CPT

## 2024-05-03 PROCEDURE — 82805 BLOOD GASES W/O2 SATURATION: CPT | Performed by: OBSTETRICS & GYNECOLOGY

## 2024-05-03 PROCEDURE — 86901 BLOOD TYPING SEROLOGIC RH(D): CPT

## 2024-05-03 PROCEDURE — G0463 HOSPITAL OUTPT CLINIC VISIT: HCPCS

## 2024-05-03 PROCEDURE — 59510 CESAREAN DELIVERY: CPT | Performed by: OBSTETRICS & GYNECOLOGY

## 2024-05-03 PROCEDURE — 86900 BLOOD TYPING SEROLOGIC ABO: CPT

## 2024-05-03 PROCEDURE — 85025 COMPLETE CBC W/AUTO DIFF WBC: CPT

## 2024-05-03 PROCEDURE — 88302 TISSUE EXAM BY PATHOLOGIST: CPT | Performed by: STUDENT IN AN ORGANIZED HEALTH CARE EDUCATION/TRAINING PROGRAM

## 2024-05-03 PROCEDURE — 0UB70ZZ EXCISION OF BILATERAL FALLOPIAN TUBES, OPEN APPROACH: ICD-10-PCS | Performed by: OBSTETRICS & GYNECOLOGY

## 2024-05-03 RX ORDER — EPHEDRINE SULFATE 50 MG/ML
INJECTION INTRAVENOUS AS NEEDED
Status: DISCONTINUED | OUTPATIENT
Start: 2024-05-03 | End: 2024-05-03

## 2024-05-03 RX ORDER — CEFAZOLIN SODIUM 2 G/50ML
2000 SOLUTION INTRAVENOUS ONCE
Status: COMPLETED | OUTPATIENT
Start: 2024-05-03 | End: 2024-05-03

## 2024-05-03 RX ORDER — MEPERIDINE HYDROCHLORIDE 25 MG/ML
12.5 INJECTION INTRAMUSCULAR; INTRAVENOUS; SUBCUTANEOUS
Status: DISCONTINUED | OUTPATIENT
Start: 2024-05-03 | End: 2024-05-06 | Stop reason: HOSPADM

## 2024-05-03 RX ORDER — ONDANSETRON 2 MG/ML
INJECTION INTRAMUSCULAR; INTRAVENOUS AS NEEDED
Status: DISCONTINUED | OUTPATIENT
Start: 2024-05-03 | End: 2024-05-03

## 2024-05-03 RX ORDER — DEXAMETHASONE SODIUM PHOSPHATE 10 MG/ML
INJECTION, SOLUTION INTRAMUSCULAR; INTRAVENOUS AS NEEDED
Status: DISCONTINUED | OUTPATIENT
Start: 2024-05-03 | End: 2024-05-03

## 2024-05-03 RX ORDER — OXYTOCIN/RINGER'S LACTATE 30/500 ML
62.5 PLASTIC BAG, INJECTION (ML) INTRAVENOUS ONCE
Status: COMPLETED | OUTPATIENT
Start: 2024-05-03 | End: 2024-05-03

## 2024-05-03 RX ORDER — SODIUM CHLORIDE, SODIUM LACTATE, POTASSIUM CHLORIDE, CALCIUM CHLORIDE 600; 310; 30; 20 MG/100ML; MG/100ML; MG/100ML; MG/100ML
125 INJECTION, SOLUTION INTRAVENOUS CONTINUOUS
Status: DISCONTINUED | OUTPATIENT
Start: 2024-05-03 | End: 2024-05-06 | Stop reason: HOSPADM

## 2024-05-03 RX ORDER — CITRIC ACID/SODIUM CITRATE 334-500MG
30 SOLUTION, ORAL ORAL ONCE
Status: COMPLETED | OUTPATIENT
Start: 2024-05-03 | End: 2024-05-03

## 2024-05-03 RX ORDER — NALOXONE HYDROCHLORIDE 0.4 MG/ML
0.1 INJECTION, SOLUTION INTRAMUSCULAR; INTRAVENOUS; SUBCUTANEOUS
Status: ACTIVE | OUTPATIENT
Start: 2024-05-03 | End: 2024-05-04

## 2024-05-03 RX ORDER — FAMOTIDINE 20 MG/1
20 TABLET, FILM COATED ORAL 2 TIMES DAILY
Status: DISCONTINUED | OUTPATIENT
Start: 2024-05-03 | End: 2024-05-06 | Stop reason: HOSPADM

## 2024-05-03 RX ORDER — DOCUSATE SODIUM 100 MG/1
100 CAPSULE, LIQUID FILLED ORAL 2 TIMES DAILY
Status: DISCONTINUED | OUTPATIENT
Start: 2024-05-03 | End: 2024-05-06 | Stop reason: HOSPADM

## 2024-05-03 RX ORDER — SIMETHICONE 80 MG
80 TABLET,CHEWABLE ORAL 4 TIMES DAILY PRN
Status: DISCONTINUED | OUTPATIENT
Start: 2024-05-03 | End: 2024-05-06 | Stop reason: HOSPADM

## 2024-05-03 RX ORDER — ENOXAPARIN SODIUM 100 MG/ML
40 INJECTION SUBCUTANEOUS DAILY
Status: DISCONTINUED | OUTPATIENT
Start: 2024-05-03 | End: 2024-05-06 | Stop reason: HOSPADM

## 2024-05-03 RX ORDER — SODIUM CHLORIDE, SODIUM LACTATE, POTASSIUM CHLORIDE, CALCIUM CHLORIDE 600; 310; 30; 20 MG/100ML; MG/100ML; MG/100ML; MG/100ML
INJECTION, SOLUTION INTRAVENOUS CONTINUOUS PRN
Status: DISCONTINUED | OUTPATIENT
Start: 2024-05-03 | End: 2024-05-03

## 2024-05-03 RX ORDER — NALBUPHINE HYDROCHLORIDE 10 MG/ML
5 INJECTION, SOLUTION INTRAMUSCULAR; INTRAVENOUS; SUBCUTANEOUS
Status: DISPENSED | OUTPATIENT
Start: 2024-05-03 | End: 2024-05-04

## 2024-05-03 RX ORDER — FENTANYL CITRATE/PF 50 MCG/ML
25 SYRINGE (ML) INJECTION
Status: DISCONTINUED | OUTPATIENT
Start: 2024-05-03 | End: 2024-05-06 | Stop reason: HOSPADM

## 2024-05-03 RX ORDER — FENTANYL CITRATE 50 UG/ML
INJECTION, SOLUTION INTRAMUSCULAR; INTRAVENOUS AS NEEDED
Status: DISCONTINUED | OUTPATIENT
Start: 2024-05-03 | End: 2024-05-03

## 2024-05-03 RX ORDER — METOCLOPRAMIDE HYDROCHLORIDE 5 MG/ML
5 INJECTION INTRAMUSCULAR; INTRAVENOUS ONCE
Status: COMPLETED | OUTPATIENT
Start: 2024-05-03 | End: 2024-05-03

## 2024-05-03 RX ORDER — KETOROLAC TROMETHAMINE 30 MG/ML
INJECTION, SOLUTION INTRAMUSCULAR; INTRAVENOUS AS NEEDED
Status: DISCONTINUED | OUTPATIENT
Start: 2024-05-03 | End: 2024-05-03

## 2024-05-03 RX ORDER — OXYTOCIN/RINGER'S LACTATE 30/500 ML
PLASTIC BAG, INJECTION (ML) INTRAVENOUS CONTINUOUS PRN
Status: DISCONTINUED | OUTPATIENT
Start: 2024-05-03 | End: 2024-05-03

## 2024-05-03 RX ORDER — OXYCODONE HYDROCHLORIDE 10 MG/1
10 TABLET ORAL EVERY 4 HOURS PRN
Status: DISCONTINUED | OUTPATIENT
Start: 2024-05-04 | End: 2024-05-06 | Stop reason: HOSPADM

## 2024-05-03 RX ORDER — ACETAMINOPHEN 325 MG/1
650 TABLET ORAL EVERY 6 HOURS SCHEDULED
Status: DISCONTINUED | OUTPATIENT
Start: 2024-05-03 | End: 2024-05-06 | Stop reason: HOSPADM

## 2024-05-03 RX ORDER — ONDANSETRON 2 MG/ML
4 INJECTION INTRAMUSCULAR; INTRAVENOUS EVERY 8 HOURS PRN
Status: DISCONTINUED | OUTPATIENT
Start: 2024-05-03 | End: 2024-05-06 | Stop reason: HOSPADM

## 2024-05-03 RX ORDER — CALCIUM CARBONATE 500 MG/1
1000 TABLET, CHEWABLE ORAL DAILY PRN
Status: DISCONTINUED | OUTPATIENT
Start: 2024-05-03 | End: 2024-05-06 | Stop reason: HOSPADM

## 2024-05-03 RX ORDER — DIPHENHYDRAMINE HCL 25 MG
25 TABLET ORAL EVERY 6 HOURS PRN
Status: DISCONTINUED | OUTPATIENT
Start: 2024-05-03 | End: 2024-05-06 | Stop reason: HOSPADM

## 2024-05-03 RX ORDER — HYDROMORPHONE HCL/PF 1 MG/ML
0.5 SYRINGE (ML) INJECTION EVERY 2 HOUR PRN
Status: DISPENSED | OUTPATIENT
Start: 2024-05-03 | End: 2024-05-04

## 2024-05-03 RX ORDER — ACETAMINOPHEN 325 MG/1
650 TABLET ORAL EVERY 6 HOURS PRN
Status: DISPENSED | OUTPATIENT
Start: 2024-05-03 | End: 2024-05-04

## 2024-05-03 RX ORDER — SODIUM CHLORIDE, SODIUM LACTATE, POTASSIUM CHLORIDE, CALCIUM CHLORIDE 600; 310; 30; 20 MG/100ML; MG/100ML; MG/100ML; MG/100ML
125 INJECTION, SOLUTION INTRAVENOUS CONTINUOUS
Status: DISCONTINUED | OUTPATIENT
Start: 2024-05-03 | End: 2024-05-03

## 2024-05-03 RX ORDER — BENZOCAINE/MENTHOL 6 MG-10 MG
1 LOZENGE MUCOUS MEMBRANE DAILY PRN
Status: DISCONTINUED | OUTPATIENT
Start: 2024-05-03 | End: 2024-05-06 | Stop reason: HOSPADM

## 2024-05-03 RX ORDER — ONDANSETRON 2 MG/ML
4 INJECTION INTRAMUSCULAR; INTRAVENOUS EVERY 4 HOURS PRN
Status: DISCONTINUED | OUTPATIENT
Start: 2024-05-03 | End: 2024-05-06 | Stop reason: HOSPADM

## 2024-05-03 RX ORDER — MORPHINE SULFATE 0.5 MG/ML
INJECTION, SOLUTION EPIDURAL; INTRATHECAL; INTRAVENOUS AS NEEDED
Status: DISCONTINUED | OUTPATIENT
Start: 2024-05-03 | End: 2024-05-03

## 2024-05-03 RX ORDER — OXYCODONE HYDROCHLORIDE 5 MG/1
5 TABLET ORAL EVERY 4 HOURS PRN
Status: DISCONTINUED | OUTPATIENT
Start: 2024-05-04 | End: 2024-05-06 | Stop reason: HOSPADM

## 2024-05-03 RX ORDER — BUPIVACAINE HYDROCHLORIDE 7.5 MG/ML
INJECTION, SOLUTION INTRASPINAL AS NEEDED
Status: DISCONTINUED | OUTPATIENT
Start: 2024-05-03 | End: 2024-05-03

## 2024-05-03 RX ORDER — FAMOTIDINE 10 MG/ML
20 INJECTION, SOLUTION INTRAVENOUS ONCE
Status: COMPLETED | OUTPATIENT
Start: 2024-05-03 | End: 2024-05-03

## 2024-05-03 RX ORDER — HYDROMORPHONE HCL/PF 1 MG/ML
0.5 SYRINGE (ML) INJECTION
Status: DISCONTINUED | OUTPATIENT
Start: 2024-05-03 | End: 2024-05-06 | Stop reason: HOSPADM

## 2024-05-03 RX ADMIN — NALBUPHINE HYDROCHLORIDE 5 MG: 10 INJECTION, SOLUTION INTRAMUSCULAR; INTRAVENOUS; SUBCUTANEOUS at 03:03

## 2024-05-03 RX ADMIN — SODIUM CHLORIDE, SODIUM LACTATE, POTASSIUM CHLORIDE, AND CALCIUM CHLORIDE 125 ML/HR: .6; .31; .03; .02 INJECTION, SOLUTION INTRAVENOUS at 05:48

## 2024-05-03 RX ADMIN — DOCUSATE SODIUM 100 MG: 100 CAPSULE, LIQUID FILLED ORAL at 08:33

## 2024-05-03 RX ADMIN — SODIUM CHLORIDE, SODIUM LACTATE, POTASSIUM CHLORIDE, AND CALCIUM CHLORIDE 125 ML/HR: .6; .31; .03; .02 INJECTION, SOLUTION INTRAVENOUS at 14:15

## 2024-05-03 RX ADMIN — WITCH HAZEL 1 PAD: 500 SOLUTION RECTAL; TOPICAL at 05:33

## 2024-05-03 RX ADMIN — HYDROMORPHONE HYDROCHLORIDE 0.5 MG: 1 INJECTION, SOLUTION INTRAMUSCULAR; INTRAVENOUS; SUBCUTANEOUS at 21:51

## 2024-05-03 RX ADMIN — EPHEDRINE SULFATE 15 MG: 50 INJECTION INTRAVENOUS at 01:43

## 2024-05-03 RX ADMIN — BENZOCAINE AND LEVOMENTHOL 1 APPLICATION: 200; 5 SPRAY TOPICAL at 05:32

## 2024-05-03 RX ADMIN — SODIUM CHLORIDE, SODIUM LACTATE, POTASSIUM CHLORIDE, AND CALCIUM CHLORIDE: .6; .31; .03; .02 INJECTION, SOLUTION INTRAVENOUS at 01:26

## 2024-05-03 RX ADMIN — Medication 250 MILLI-UNITS/MIN: at 01:57

## 2024-05-03 RX ADMIN — ENOXAPARIN SODIUM 40 MG: 40 INJECTION SUBCUTANEOUS at 21:06

## 2024-05-03 RX ADMIN — FAMOTIDINE 20 MG: 20 TABLET ORAL at 08:33

## 2024-05-03 RX ADMIN — KETOROLAC TROMETHAMINE 30 MG: 30 INJECTION, SOLUTION INTRAMUSCULAR; INTRAVENOUS at 02:49

## 2024-05-03 RX ADMIN — CEFAZOLIN SODIUM 2000 MG: 2 SOLUTION INTRAVENOUS at 01:36

## 2024-05-03 RX ADMIN — MORPHINE SULFATE 0.15 MG: 0.5 INJECTION, SOLUTION EPIDURAL; INTRATHECAL; INTRAVENOUS at 01:39

## 2024-05-03 RX ADMIN — ONDANSETRON 4 MG: 2 INJECTION INTRAMUSCULAR; INTRAVENOUS at 01:41

## 2024-05-03 RX ADMIN — ONDANSETRON 4 MG: 2 INJECTION INTRAMUSCULAR; INTRAVENOUS at 11:34

## 2024-05-03 RX ADMIN — SODIUM CHLORIDE, SODIUM LACTATE, POTASSIUM CHLORIDE, AND CALCIUM CHLORIDE 125 ML/HR: .6; .31; .03; .02 INJECTION, SOLUTION INTRAVENOUS at 01:10

## 2024-05-03 RX ADMIN — SODIUM CITRATE AND CITRIC ACID MONOHYDRATE 30 ML: 500; 334 SOLUTION ORAL at 00:22

## 2024-05-03 RX ADMIN — SIMETHICONE 80 MG: 80 TABLET, CHEWABLE ORAL at 21:15

## 2024-05-03 RX ADMIN — Medication 500 MG: at 01:46

## 2024-05-03 RX ADMIN — Medication 62.5 MILLI-UNITS/MIN: at 05:42

## 2024-05-03 RX ADMIN — SODIUM CHLORIDE, SODIUM LACTATE, POTASSIUM CHLORIDE, AND CALCIUM CHLORIDE 1000 ML: .6; .31; .03; .02 INJECTION, SOLUTION INTRAVENOUS at 12:08

## 2024-05-03 RX ADMIN — FENTANYL CITRATE 15 MCG: 50 INJECTION INTRAMUSCULAR; INTRAVENOUS at 01:39

## 2024-05-03 RX ADMIN — FAMOTIDINE 20 MG: 20 TABLET ORAL at 17:36

## 2024-05-03 RX ADMIN — HYDROCORTISONE 1 APPLICATION: 1 CREAM TOPICAL at 05:33

## 2024-05-03 RX ADMIN — ACETAMINOPHEN 650 MG: 325 TABLET, FILM COATED ORAL at 17:36

## 2024-05-03 RX ADMIN — BUPIVACAINE HYDROCHLORIDE IN DEXTROSE 1.6 ML: 7.5 INJECTION, SOLUTION SUBARACHNOID at 01:39

## 2024-05-03 RX ADMIN — METOCLOPRAMIDE 5 MG: 5 INJECTION, SOLUTION INTRAMUSCULAR; INTRAVENOUS at 00:22

## 2024-05-03 RX ADMIN — FAMOTIDINE 20 MG: 10 INJECTION INTRAVENOUS at 00:23

## 2024-05-03 RX ADMIN — NALBUPHINE HYDROCHLORIDE 5 MG: 10 INJECTION, SOLUTION INTRAMUSCULAR; INTRAVENOUS; SUBCUTANEOUS at 21:45

## 2024-05-03 RX ADMIN — NALBUPHINE HYDROCHLORIDE 5 MG: 10 INJECTION, SOLUTION INTRAMUSCULAR; INTRAVENOUS; SUBCUTANEOUS at 05:36

## 2024-05-03 RX ADMIN — DOCUSATE SODIUM 100 MG: 100 CAPSULE, LIQUID FILLED ORAL at 17:36

## 2024-05-03 RX ADMIN — ACETAMINOPHEN 650 MG: 325 TABLET, FILM COATED ORAL at 11:17

## 2024-05-03 RX ADMIN — ACETAMINOPHEN 650 MG: 325 TABLET, FILM COATED ORAL at 23:10

## 2024-05-03 RX ADMIN — SODIUM CHLORIDE, SODIUM LACTATE, POTASSIUM CHLORIDE, AND CALCIUM CHLORIDE 1000 ML: .6; .31; .03; .02 INJECTION, SOLUTION INTRAVENOUS at 00:20

## 2024-05-03 RX ADMIN — PHENYLEPHRINE HYDROCHLORIDE 50 MCG/MIN: 50 INJECTION INTRAVENOUS at 01:39

## 2024-05-03 RX ADMIN — DEXAMETHASONE SODIUM PHOSPHATE 10 MG: 10 INJECTION INTRAMUSCULAR; INTRAVENOUS at 01:41

## 2024-05-03 RX ADMIN — NALBUPHINE HYDROCHLORIDE 5 MG: 10 INJECTION, SOLUTION INTRAMUSCULAR; INTRAVENOUS; SUBCUTANEOUS at 08:36

## 2024-05-03 RX ADMIN — ACETAMINOPHEN 650 MG: 325 TABLET, FILM COATED ORAL at 05:35

## 2024-05-03 NOTE — DISCHARGE SUMMARY
Discharge Summary - Tamika Phelan 33 y.o. female MRN: [id number]    Unit/Bed#: -01 Encounter: [id number]    Admission Date: 2024     Discharge Date: 2024    Patient Active Problem List   Diagnosis    Eczema, dyshidrotic    Acne    Irregular menstrual cycle    Overweight    PCOS (polycystic ovarian syndrome)    Vitamin D deficiency    Excess skin of abdominal wall    Postsurgical malabsorption    Iron deficiency anemia, unspecified    Previous gastric bypass affecting pregnancy in third trimester, antepartum    Umbilical hernia    Multigravida of advanced maternal age in second trimester    Maternal care due to low transverse uterine scar from previous  delivery    Previous  delivery, antepartum    Epigastric pain    Status post repeat low transverse  section    Elevated blood pressure reading    Status post bilateral salpingectomy       OBGYN Practice: [facility] Course:   Tamika Phelan is a 33 y.o.  who was admitted at 36w1d for RLTCS and BS in the setting of  labor.    Delivery Findings:  Tamika delivered a viable female  on 5/3/2024  1:56 AM  via RLTCS with BS for sterilization. The delivery was uncomplicated.    Baby's Weight: 3100 g (6 lb 13.4 oz) ; 109.35     Apgar scores: 7  and 8  at 1 and 5 minutes, respectively  Anesthesia: Spinal   QBL: 687cc     was transferred to NICU. Patient tolerated the procedure well and was transferred to recovery in stable condition.     Her post-partum course was uncomplicated.  Her post-partum pain was well controlled with oral analgesics. On day of discharge she was ambulating, voiding spontaneously, tolerating oral intake and hemodynamically stable. Mom's blood type is O positive and  Rhogam was not given.    She was discharged home on postpartum day #3 without complications. Patient was instructed to follow up with her OB as an outpatient and was given  appropriate warnings to call doctor or provider if she develops signs of infection or uncontrolled pain.    Discharge Problem List by Issue:   * Status post repeat low transverse  section  Assessment & Plan       Continue routine post partum care       , HgB 10.0 -> 9.6, venofer ordered  Pain well controlled: tylenol/motrin scheduled, sherron prn  Lochia within normal limits: continue to monitor   OOB: as able, encourage ambulation  Passing flatus and has had a BM  Voiding spontaneously  DVT ppx: SCD, Lovenox 40mg  Encourage breastfeeding  Contraception: s/p salpingectomy  Baby in: room  Dispo: anticipate d/c home POD2-4      Status post bilateral salpingectomy  Assessment & Plan  Performed at the time of cs    Elevated blood pressure reading  Assessment & Plan  Pressure 111/90 in triage  Labs wnl  Normotensive throughout labor  Asymptomatic at this time  Continue to monitor  Systolic (12hrs), Av , Min:108 , Max:115   Diastolic (12hrs), Av, Min:71, Max:76    ]         Disposition: Home    Planned Readmission: No    Discharge Medications:   Please see AVS    Discharge instructions :   -Do not place anything (no partner, tampons or douche) in your vagina for 6 weeks.  -You may walk for exercise for the first 6 weeks then gradually return to your usual activities.   -Please do not drive for 1 week if you have no stitches and for 2 weeks if you have stitches or underwent a  delivery.    -You may take baths or shower per your preference.   -Please look at your bust (breasts) in the mirror daily and call your doctor for redness or tenderness or increased warmth.   - If you have had a  section please look at your incision daily as well and call provider for increasing redness or steady drainage from the incision.   -Please call your doctor's office if temperature > 100.4*F or 38* C, worsening pain or a foul discharge.    Follow Up:  - Follow up in 3 weeks for postpartum visit    Eleonora  MD Jyoti   PGY-I, OBN  5/6/2024  8:36 AM

## 2024-05-03 NOTE — PROGRESS NOTES
"Progress Note - OB/GYN  Tamika Phelan 33 y.o. female MRN: [id number]  Unit/Bed#: -01 Encounter: [id number]      Subjective:  32 yo  s/p RLTCS and bilateral salpingectomy, recovering well.     Pain: well controlled  Tolerating PO: yes  Voiding: Cruz in place  Flatus: yes  BM: no  Ambulating: not yet  Chest pain: denies  Shortness of breath: denies  Leg pain: no  Lochia: normal     Vitals:   /66 (BP Location: Left arm)   Pulse 80   Temp 97.7 °F (36.5 °C) (Oral)   Resp 18   Ht 5' 1\" (1.549 m)   Wt 91.6 kg (202 lb)   LMP 2023   SpO2 97%   Breastfeeding Unknown   BMI 38.17 kg/m²       Intake/Output Summary (Last 24 hours) at 5/3/2024 0614  Last data filed at 5/3/2024 0245  Gross per 24 hour   Intake 700 ml   Output 687 ml   Net 13 ml       Invasive Devices       Peripheral Intravenous Line  Duration             Peripheral IV 24 Right;Dorsal (posterior) Hand <1 day              Drain  Duration             Urethral Catheter Non-latex 16 Fr. <1 day                    Physical Exam:   GEN: Tamika Phelan appears well, alert and oriented x 3, pleasant and cooperative  CARDIOPULMONARY: CTA b/l; no murmurs, rubs, or gallops; no rhonchi, rales, or wheezing  ABDOMEN: soft, no tenderness, no distention, fundus firm under umbilicus, Incision C/D/I  EXTREMITIES: SCDs on, b/l Laurent's sign negative      Labs:   Admission on 2024   Component Date Value    ABO Grouping 2024 O     Rh Factor 2024 Positive     Antibody Screen 2024 Negative     Specimen Expiration Date 202440506     WBC 2024 11.64 (H)     RBC 2024 3.51 (L)     Hemoglobin 2024 10.0 (L)     Hematocrit 2024 30.5 (L)     MCV 2024 87     MCH 2024 28.5     MCHC 2024 32.8     RDW 2024 12.8     Platelets 2024 213     MPV 2024 10.6     Sodium 2024 138     Potassium 2024 3.5     Chloride 2024 109 (H)     CO2 " 05/03/2024 20 (L)     ANION GAP 05/03/2024 9     BUN 05/03/2024 10     Creatinine 05/03/2024 0.56 (L)     Glucose 05/03/2024 97     Calcium 05/03/2024 8.3 (L)     AST 05/03/2024 11 (L)     ALT 05/03/2024 6 (L)     Alkaline Phosphatase 05/03/2024 112 (H)     Total Protein 05/03/2024 6.3 (L)     Albumin 05/03/2024 3.6     Total Bilirubin 05/03/2024 0.31     eGFR 05/03/2024 121     pH, Cord Layo 05/03/2024 7.386     pCO2, Cord Layo 05/03/2024 35.6     pO2, Cord Layo 05/03/2024 29.3     HCO3, Cord Layo 05/03/2024 20.9     Base Exc, Cord Layo 05/03/2024 -3.5 (L)     O2 Cont, Cord Layo 05/03/2024 13.3     O2 HGB,VENOUS CORD 05/03/2024 73.4     pH, Cord Art 05/03/2024 7.282     pCO2, Cord Art 05/03/2024 48.6     pO2, Cord Art 05/03/2024 30.0 (H)     HCO3, Cord Art 05/03/2024 22.4     Base Exc, Cord Art 05/03/2024 -4.5 (L)     O2 Content, Cord Art 05/03/2024 12.4     O2 Hgb, Arterial Cord 05/03/2024 68.5          Assessment:  Postop Day #0 s/p RLTCS + BS, stable  UOP 0.48 mL/kg/hr    Plan:  - Routine postoperative care  - QBL 687cc, Hgb 10.0g/dL --> Noon CBC ordered  - Cruz in place, monitor UOP,plan for TOV in am  - Encourage ambulation  - Encourage breastfeeding  - DVT ppx: SCDs and Lovenox 40 mg qD  - Anticipate discharge POD2/3    Kristy Fenton MD  5/3/2024  6:14 AM

## 2024-05-03 NOTE — ASSESSMENT & PLAN NOTE
Pressure 111/90 in triage  Labs wnl  Normotensive throughout labor  Asymptomatic at this time  Continue to monitor  Systolic (12hrs), Av , Min:108 , Max:115   Diastolic (12hrs), Av, Min:71, Max:76    ]

## 2024-05-03 NOTE — H&P
H & P- Obstetrics   Tamika Phelan 33 y.o. female MRN: [id number]  Unit/Bed#: LD TRIAGE  Encounter: [id number]      Assessment/Plan:    Tamika is a 33 y.o.  at 36w1d admitted for repeat  and tubal in the setting of  labor    SVE: Cervical Dilation: 4-5  Cervical Effacement: 50  Cervical Consistency: Medium  Fetal Station: -3  Presentation: Vertex  Position: Unknown  Method: Manual  OB Examiner: Dereje    Request for sterilization  Assessment & Plan  MA-31 not needed  Will proceed with BTL at time of RLTCS    Elevated blood pressure reading  Assessment & Plan  Pressure 111/90 in triage  F/U CBC CMP, collect P/C off guaman      Uterine contractions  Assessment & Plan  Admit to OBGYN  CBC, RPR, Blood Type & Screen  3.  Anesthesia consult for spinal, GI cocktail given (last ate at )  4.  Ancef 2 gm for ppx  5.  NPO   6.  To OR for  delivery                Patient of: [facility]  This patient will be an INPATIENT  and I certify the anticipated length of stay is >2 Midnights  Discussed with Dr. Tsang    SUBJECTIVE:    Chief Complaint: contractions    HPI: Tamika Phelan is a 33 y.o.  with an MCKAYLA of 2024, by Ultrasound at 36w1d who is being admitted for labor . She complains of uterine contractions, occurring every 2-5 minutes, has no LOF, and reports no VB. She states she has felt good FM.. This pregnancy is complicated by the issues above. All other review of systems is negative.       Pregnancy Plan:  Pregnancy: Ricks  Fetal sex: Female      Patient Active Problem List   Diagnosis    Eczema, dyshidrotic    Acne    Irregular menstrual cycle    Overweight    PCOS (polycystic ovarian syndrome)    Vitamin D deficiency    Excess skin of abdominal wall    Postsurgical malabsorption    Iron deficiency anemia, unspecified    Previous gastric bypass affecting pregnancy in third trimester, antepartum    Umbilical hernia    Multigravida  of advanced maternal age in second trimester    Maternal care due to low transverse uterine scar from previous  delivery    Previous  delivery, antepartum    Epigastric pain    31 weeks gestation of pregnancy    Uterine contractions    Elevated blood pressure reading    Request for sterilization       OB History    Para Term  AB Living   7 4 3 1 2 4   SAB IAB Ectopic Multiple Live Births   2 0 0 0 4      # Outcome Date GA Lbr Deni/2nd Weight Sex Delivery Anes PTL Lv   7 Current            6 SAB 2022           5 Term 20 38w0d / 00:08 3714 g (8 lb 3 oz) M Vag-Spont EPI N SUE   4 SAB 2019 6w0d    SAB      3  10/23/12 35w6d  2807 g (6 lb 3 oz) M CS-LTranv EPI Y SUE      Birth Comments: prolapsed cord, emerg c/s      Complications:  delivery, Delivery by emergency    2 Term 08 38w0d  3657 g (8 lb 1 oz) M Vag-Spont EPI N SUE   1 Term 06 39w0d  3657 g (8 lb 1 oz) F Vag-Spont EPI N SUE      Obstetric Comments   Vaginal birth x 2    x 1       Past Medical History:   Diagnosis Date    Asthma     Female infertility     PCOS related    History of COVID-2021- not hospitalized    Obesity, morbid, BMI 40.0-49.9 (HCC)     Polycystic ovary syndrome     Varicella        Past Surgical History:   Procedure Laterality Date     SECTION      x1    EGD      CA LAPS GSTR RSTCV PX W/BYP MONIQUE-EN-Y LIMB <150 CM N/A 2021    Procedure: BYPASS GASTRIC MONIQUE-EN-Y LAPAROSCOPIC W ROBOTICS W/ INTRAOP EGD;  Surgeon: Jim Mederos MD;  Location: [facility] OR;  Service: Bariatrics       Social History     Tobacco Use    Smoking status: Never    Smokeless tobacco: Never    Tobacco comments:     no passive smoke exposure   Substance Use Topics    Alcohol use: No       No Known Allergies    Medications Prior to Admission   Medication    famotidine (PEPCID) 20 mg tablet    Prenatal Vit-Fe Fumarate-FA (PRENATAL PO)    hydrocortisone 1 % cream  "          OBJECTIVE:  Vitals:  Temp:  [98.3 °F (36.8 °C)] 98.3 °F (36.8 °C)  HR:  [105] 105  Resp:  [18] 18  BP: (111)/(90) 111/90  Body mass index is 38.17 kg/m².     Physical Exam:  General: Well appearing, no distress  Respiratory: Unlabored breathing  Cardiovascular: Regular rate.  Abdomen: Soft, gravid, nontender  Fundal Height: Appropriate for gestational age.  Extremities: Warm and well perfused.  Non tender.  Psychiatric: Behavioral normal        FHT:  Reactive NST    TOCO:   Roxy every 2-5 minutes      Prenatal Labs: Blood Type:   Lab Results   Component Value Date/Time    ABO Grouping O 11/07/2023 09:40 AM     , D (Rh type):   Lab Results   Component Value Date/Time    Rh Factor Positive 11/07/2023 09:40 AM     , Antibody Screen: No results found for: \"ANTIBODYSCR\" , HCT/HGB:   Lab Results   Component Value Date/Time    Hematocrit 32.0 (L) 02/29/2024 10:28 PM    Hemoglobin 10.9 (L) 02/29/2024 10:28 PM      , Platelets:   Lab Results   Component Value Date/Time    Platelets 209 02/29/2024 10:28 PM      , Varicella:   Lab Results   Component Value Date/Time    Varicella IgG NON-IMMUNE (A) 11/07/2023 09:40 AM       , Rubella:   Lab Results   Component Value Date/Time    Rubella IgG Quant 16.4 11/07/2023 09:40 AM        , VDRL/RPR:   Lab Results   Component Value Date/Time    RPR Non-Reactive 07/18/2020 10:02 PM      , Urine Culture/Screen:   Lab Results   Component Value Date/Time    Urine Culture 20,000-29,000 cfu/ml 11/07/2023 09:40 AM       , Hep B:   Lab Results   Component Value Date/Time    Hepatitis B Surface Ag Non-reactive 11/07/2023 09:40 AM     , Hep C: No components found for: \"HEPCSAG\", \"EXTHEPCSAG\"   , HIV:   Lab Results   Component Value Date/Time    HIV-1/HIV-2 Ab Non-Reactive 03/02/2020 01:43 PM     , Chlamydia:   Lab Results   Component Value Date/Time    External Chlamydia Screen negative 01/15/2020 12:00 AM     , Gonorrhea:   Lab Results   Component Value Date/Time    N gonorrhoeae, " "DNA Probe Negative 05/01/2024 03:05 PM     , Group B Strep:    Lab Results   Component Value Date/Time    Strep Grp B PCR Positive for Beta Hemolytic Strep Grp B by PCR (A) 07/01/2020 05:04 PM            Monse DO Dereje  5/3/2024  12:22 AM        Portions of the record may have been created with voice recognition software.  Occasional wrong word or \"sound a like\" substitutions may have occurred due to the inherent limitations of voice recognition software.  Read the chart carefully and recognize, using context, where substitutions have occurred    "

## 2024-05-03 NOTE — ANESTHESIA PREPROCEDURE EVALUATION
Procedure:   SECTION () REPEAT (Uterus)  LIGATION/COAGULATION TUBAL (Bilateral: Abdomen)    Relevant Problems   GYN   (+) 31 weeks gestation of pregnancy   (+) Multigravida of advanced maternal age in second trimester      HEMATOLOGY   (+) Iron deficiency anemia, unspecified        Physical Exam    Airway    Mallampati score: II  TM Distance: >3 FB  Neck ROM: full     Dental   No notable dental hx     Cardiovascular  Rhythm: regular, Rate: normal    Pulmonary   Breath sounds clear to auscultation    Other Findings  Intercisor Distance > 3cm    post-pubertal.      Anesthesia Plan  ASA Score- 2     Anesthesia Type- spinal with ASA Monitors.         Additional Monitors:     Airway Plan:     Comment: Discussed benefits/risks of neuraxial anesthesia including possibility of discomfort at site of injection, post-dural puncture headache, block failure, and more rare complications such as bleeding, infection, and permanent nerve damage. If block fails or there is difficulty placing neuraxial block, general anesthesia was discussed as back-up plan. Patient understands and wishes to proceed. All questions answered.   .       Plan Factors-Exercise tolerance (METS): >4 METS.    Chart reviewed. EKG reviewed.  Existing labs reviewed.                   Induction-     Postoperative Plan- Plan for postoperative opioid use.     Informed Consent- Anesthetic plan and risks discussed with patient.  I personally reviewed this patient with the CRNA. Discussed and agreed on the Anesthesia Plan with the CRNA..

## 2024-05-03 NOTE — PLAN OF CARE
Problem: PAIN - ADULT  Goal: Verbalizes/displays adequate comfort level or baseline comfort level  Description: Interventions:  - Encourage patient to monitor pain and request assistance  - Assess pain using appropriate pain scale  - Administer analgesics based on type and severity of pain and evaluate response  - Implement non-pharmacological measures as appropriate and evaluate response  - Consider cultural and social influences on pain and pain management  - Notify physician/advanced practitioner if interventions unsuccessful or patient reports new pain  Outcome: Progressing     Problem: INFECTION - ADULT  Goal: Absence or prevention of progression during hospitalization  Description: INTERVENTIONS:  - Assess and monitor for signs and symptoms of infection  - Monitor lab/diagnostic results  - Monitor all insertion sites, i.e. indwelling lines, tubes, and drains  - Monitor endotracheal if appropriate and nasal secretions for changes in amount and color  - Maybee appropriate cooling/warming therapies per order  - Administer medications as ordered  - Instruct and encourage patient and family to use good hand hygiene technique  - Identify and instruct in appropriate isolation precautions for identified infection/condition  Outcome: Progressing  Goal: Absence of fever/infection during neutropenic period  Description: INTERVENTIONS:  - Monitor WBC    Outcome: Progressing     Problem: SAFETY ADULT  Goal: Patient will remain free of falls  Description: INTERVENTIONS:  - Educate patient/family on patient safety including physical limitations  - Instruct patient to call for assistance with activity   - Consult OT/PT to assist with strengthening/mobility   - Keep Call bell within reach  - Keep bed low and locked with side rails adjusted as appropriate  - Keep care items and personal belongings within reach  - Initiate and maintain comfort rounds  - Make Fall Risk Sign visible to staff  - Apply yellow socks and bracelet  for high fall risk patients  - Consider moving patient to room near nurses station  Outcome: Progressing  Goal: Maintain or return to baseline ADL function  Description: INTERVENTIONS:  -  Assess patient's ability to carry out ADLs; assess patient's baseline for ADL function and identify physical deficits which impact ability to perform ADLs (bathing, care of mouth/teeth, toileting, grooming, dressing, etc.)  - Assess/evaluate cause of self-care deficits   - Assess range of motion  - Assess patient's mobility; develop plan if impaired  - Assess patient's need for assistive devices and provide as appropriate  - Encourage maximum independence but intervene and supervise when necessary  - Involve family in performance of ADLs  - Assess for home care needs following discharge   - Consider OT consult to assist with ADL evaluation and planning for discharge  - Provide patient education as appropriate  Outcome: Progressing  Goal: Maintains/Returns to pre admission functional level  Description: INTERVENTIONS:  - Perform AM-PAC 6 Click Basic Mobility/ Daily Activity assessment daily.  - Set and communicate daily mobility goal to care team and patient/family/caregiver.   - Collaborate with rehabilitation services on mobility goals if consulted  - Out of bed for toileting  - Record patient progress and toleration of activity level   Outcome: Progressing     Problem: Knowledge Deficit  Goal: Patient/family/caregiver demonstrates understanding of disease process, treatment plan, medications, and discharge instructions  Description: Complete learning assessment and assess knowledge base.  Interventions:  - Provide teaching at level of understanding  - Provide teaching via preferred learning methods  Outcome: Progressing     Problem: DISCHARGE PLANNING  Goal: Discharge to home or other facility with appropriate resources  Description: INTERVENTIONS:  - Identify barriers to discharge w/patient and caregiver  - Arrange for needed  discharge resources and transportation as appropriate  - Identify discharge learning needs (meds, wound care, etc.)  - Arrange for interpretive services to assist at discharge as needed  - Refer to Case Management Department for coordinating discharge planning if the patient needs post-hospital services based on physician/advanced practitioner order or complex needs related to functional status, cognitive ability, or social support system  Outcome: Progressing     Problem: POSTPARTUM  Goal: Experiences normal postpartum course  Description: INTERVENTIONS:  - Monitor maternal vital signs  - Assess uterine involution and lochia  Outcome: Progressing  Goal: Appropriate maternal -  bonding  Description: INTERVENTIONS:  - Identify family support  - Assess for appropriate maternal/infant bonding   -Encourage maternal/infant bonding opportunities  - Referral to  or  as needed  Outcome: Progressing  Goal: Establishment of infant feeding pattern  Description: INTERVENTIONS:  - Assess breast/bottle feeding  - Refer to lactation as needed  Outcome: Progressing  Goal: Incision(s), wounds(s) or drain site(s) healing without S/S of infection  Description: INTERVENTIONS  - Assess and document dressing, incision, wound bed, drain sites and surrounding tissue  - Provide patient and family education  Outcome: Progressing

## 2024-05-03 NOTE — ANESTHESIA PROCEDURE NOTES
Spinal Block    Patient location during procedure: OR  Start time: 5/3/2024 1:39 AM  Reason for block: procedure for pain, at surgeon's request and primary anesthetic  Staffing  Performed by: Delia Sousa CRNA  Authorized by: Antoine Beltre MD    Preanesthetic Checklist  Completed: patient identified, IV checked, site marked, risks and benefits discussed, surgical consent, monitors and equipment checked, pre-op evaluation and timeout performed  Spinal Block  Patient position: sitting  Prep: ChloraPrep and site prepped and draped  Patient monitoring: frequent blood pressure checks, continuous pulse ox and heart rate  Approach: midline  Location: L3-4  Needle  Needle type: Pencan   Needle gauge: 24 G  Needle length: 4 in  Assessment  Sensory level: T4  Injection Assessment:  negative aspiration for heme, no paresthesia on injection and positive aspiration for clear CSF.  Post-procedure:  site cleaned  Additional Notes  Needle stick x 1, no issues

## 2024-05-03 NOTE — PLAN OF CARE
Problem: PAIN - ADULT  Goal: Verbalizes/displays adequate comfort level or baseline comfort level  Description: Interventions:  - Encourage patient to monitor pain and request assistance  - Assess pain using appropriate pain scale  - Administer analgesics based on type and severity of pain and evaluate response  - Implement non-pharmacological measures as appropriate and evaluate response  - Consider cultural and social influences on pain and pain management  - Notify physician/advanced practitioner if interventions unsuccessful or patient reports new pain  Outcome: Progressing     Problem: INFECTION - ADULT  Goal: Absence or prevention of progression during hospitalization  Description: INTERVENTIONS:  - Assess and monitor for signs and symptoms of infection  - Monitor lab/diagnostic results  - Monitor all insertion sites, i.e. indwelling lines, tubes, and drains  - Monitor endotracheal if appropriate and nasal secretions for changes in amount and color  - [place] appropriate cooling/warming therapies per order  - Administer medications as ordered  - Instruct and encourage patient and family to use good hand hygiene technique  - Identify and instruct in appropriate isolation precautions for identified infection/condition  Outcome: Progressing  Goal: Absence of fever/infection during neutropenic period  Description: INTERVENTIONS:  - Monitor WBC    Outcome: Progressing     Problem: SAFETY ADULT  Goal: Patient will remain free of falls  Description: INTERVENTIONS:  - Educate patient/family on patient safety including physical limitations  - Instruct patient to call for assistance with activity   - Consult OT/PT to assist with strengthening/mobility   - Keep Call bell within reach  - Keep bed low and locked with side rails adjusted as appropriate  - Keep care items and personal belongings within reach  - Initiate and maintain comfort rounds  - Make Fall Risk Sign visible to staff  - Offer Toileting every  Hours,  in advance of need  - Initiate/Maintain alarm  - Obtain necessary fall risk management equipment:  - Apply yellow socks and bracelet for high fall risk patients  - Consider moving patient to room near nurses station  Outcome: Progressing  Goal: Maintain or return to baseline ADL function  Description: INTERVENTIONS:  -  Assess patient's ability to carry out ADLs; assess patient's baseline for ADL function and identify physical deficits which impact ability to perform ADLs (bathing, care of mouth/teeth, toileting, grooming, dressing, etc.)  - Assess/evaluate cause of self-care deficits   - Assess range of motion  - Assess patient's mobility; develop plan if impaired  - Assess patient's need for assistive devices and provide as appropriate  - Encourage maximum independence but intervene and supervise when necessary  - Involve family in performance of ADLs  - Assess for home care needs following discharge   - Consider OT consult to assist with ADL evaluation and planning for discharge  - Provide patient education as appropriate  Outcome: Progressing  Goal: Maintains/Returns to pre admission functional level  Description: INTERVENTIONS:  - Perform AM-PAC 6 Click Basic Mobility/ Daily Activity assessment daily.  - Set and communicate daily mobility goal to care team and patient/family/caregiver.   - Collaborate with rehabilitation services on mobility goals if consulted  - Perform Range of Motion  times a day.  - Reposition patient every  hours.  - Dangle patient  times a day  - Stand patient  times a day  - Ambulate patient  times a day  - Out of bed to chair  times a day   - Out of bed for meals times a day  - Out of bed for toileting  - Record patient progress and toleration of activity level   Outcome: Progressing     Problem: Knowledge Deficit  Goal: Patient/family/caregiver demonstrates understanding of disease process, treatment plan, medications, and discharge instructions  Description: Complete learning  assessment and assess knowledge base.  Interventions:  - Provide teaching at level of understanding  - Provide teaching via preferred learning methods  Outcome: Progressing     Problem: DISCHARGE PLANNING  Goal: Discharge to home or other facility with appropriate resources  Description: INTERVENTIONS:  - Identify barriers to discharge w/patient and caregiver  - Arrange for needed discharge resources and transportation as appropriate  - Identify discharge learning needs (meds, wound care, etc.)  - Arrange for interpretive services to assist at discharge as needed  - Refer to Case Management Department for coordinating discharge planning if the patient needs post-hospital services based on physician/advanced practitioner order or complex needs related to functional status, cognitive ability, or social support system  Outcome: Progressing     Problem: POSTPARTUM  Goal: Experiences normal postpartum course  Description: INTERVENTIONS:  - Monitor maternal vital signs  - Assess uterine involution and lochia  Outcome: Progressing  Goal: Appropriate maternal -  bonding  Description: INTERVENTIONS:  - Identify family support  - Assess for appropriate maternal/infant bonding   -Encourage maternal/infant bonding opportunities  - Referral to  or  as needed  Outcome: Progressing  Goal: Establishment of infant feeding pattern  Description: INTERVENTIONS:  - Assess breast/bottle feeding  - Refer to lactation as needed  Outcome: Progressing  Goal: Incision(s), wounds(s) or drain site(s) healing without S/S of infection  Description: INTERVENTIONS  - Assess and document dressing, incision, wound bed, drain sites and surrounding tissue  - Provide patient and family education  - Perform skin care/dressing changes every   Outcome: Progressing

## 2024-05-03 NOTE — LACTATION NOTE
CONSULT - LACTATION  Tamika Phelan 33 y.o. female MRN: [id number]    [facility] AN L&D Room / Bed: / 325-01 Encounter: [id number]    Maternal Information     MOTHER:  N/A  Maternal Age: This patient's mother is not on file.  OB History: This patient's mother is not on file.  Previouse breast reduction surgery? No    Lactation history:   Has patient previously breast fed: Yes   How long had patient previously breast fed: 4 months to 1 year with 4 other children   Previous breast feeding complications:     This patient's mother is not on file.    Birth information:  YOB: 1990   Time of birth:     Sex: female   Delivery type:     Birth Weight: No birth weight on file.   Percent of Weight Change: Birth weight not on file     Gestational Age: <None>   [unfilled]    Assessment        05/03/24 1200   Lactation Consultation   Reason for Consult 20;5 min   Lactation Consultant Total Time 25   Maternal Information   Has mother  before? Yes   How long did the the mother previously breastfeed? 4 months to 1 year with 4 other children   Breasts/Nipples   Date Pumping Initiated 05/03/24   Left Breast Soft   Right Breast Soft   Left Nipple Everted   Right Nipple Everted   Intervention Breast pump   Breastfeeding Status Yes   Breastfeeding Progress Pumping only   Reasons for not Breastfeeding Infant medical condition   Other OB Lactation Tools   Feeding Devices Syringe   Breast Pump   Pump 3  (has pump from insurance)   Pump Review/Education Setup, frequency, and cleaning;Milk storage   Patient Follow-Up   Lactation Consult Status 2   Follow-Up Type Inpatient;Call as needed   Other OB Lactation Documentation    Additional Problem Noted Mom is pumping for baby in NICU. mom is collecting 12 mLs each pumping session. reviewed breast milk storage guidelines. reviewed cycling through a pumping session. Enc mom to call with further assistance.  (RSB and D/C  booklets reviewed)       Feeding recommendations: pump every 2-3 hours     Mom provided with and discussed RBS, Hand expression/2nd night handout and increase supply for NICU baby. Reviewed pumping log and expectations for pumping output in the first week. Reviewed cycle pumping and appropriate pump settings, as well as pumping for 10-15 min 8-12 times per day.       Enc Mom to discussed putting baby to the breast with the NICU team when baby is medically stable to do so. Enc her to call for lactation support as needed throughout her stay.     Met with mother. Provided mother with Ready, Set, Baby booklet which contained information on:  Hand expression with access to QR codes to review hand expression.  Skin to Skin contact an benefits to mom and baby  Gave information on common concerns, what to expect the first few weeks after delivery, preparing for other caregivers, and how partners can help. Resources for support also provided.    Met with mother to go over discharge breastfeeding booklet including the feeding log. Emphasized 8 or more (12) feedings in a 24 hour period, what to expect for the number of diapers per day of life and the progression of properties of the  stooling pattern.    List of reasons to call a lactation consultant.  Hand expression  Baby's Second day (cluster feeding)  Breastfeeding and Your Lifestyle (Medications, Alcohol, Caffeine, Smoking, Street Drugs, Methadone)  First Two Weeks Survival Guide for Breastfeeding  Breast Changes  Storage and Handling of Breast milk  How to Keep Your Breast Pump Kit Clean  Mixed feeding  Bottle feeding like breastfeeding (paced bottle feeding)  astfeeding and your lifestyle, storage and preparation of breast milk, how to keep you breast pump clean, the employed breastfeeding mother and paced bottle feeding handouts.     Booklet included Breastfeeding Resources for after discharge including access to the number for the Baby & Me Support  [place].      Odalis Shoemaker 5/3/2024 12:23 PM

## 2024-05-03 NOTE — OP NOTE
OPERATIVE REPORT  PATIENT NAME: Tamika Phelan    :  1990  MRN: [id number]  Pt Location: AN L&D OR ROOM 02    SURGERY DATE: 5/3/2024    Surgeons and Role:     * Jessica Tsang MD - Primary     * Estephanie Atkins MD - Assisting    Preop Diagnosis:  Previous  section [Z98.891]    Post-Op Diagnosis Codes:     * Previous  section [Z98.891]    Procedure(s) (LRB):   SECTION () REPEAT (N/A)  LIGATION/COAGULATION TUBAL (Bilateral)    Specimen(s):  ID Type Source Tests Collected by Time Destination   1 : PRN Tissue Fallopian Tube, Right TISSUE EXAM Jessica Tsang MD 5/3/2024 0209    2 : PRN Tissue Fallopian Tube, Left TISSUE EXAM Jessica Tsang MD 5/3/2024 0217    A :  Tissue (Placenta on Hold) OB Only Placenta PLACENTA IN STORAGE Jessica Tsang MD 5/3/2024 0159    B :  Blood, Arterial Cord  Pamela Andrew, DO 5/3/2024 0056    C :   Cord  Pamela Andrew, DO 5/3/2024 0056    D :  Cord Blood Cord BLOOD GAS, VENOUS, CORD, BLOOD GAS, ARTERIAL, CORD Jessica Tsang MD 5/3/2024 0158        Surgical QBL:  Surgical QBL (mL): 687 mL    Jeison Group Classification System:  No Multiple pregnancy, No Transverse or oblique lie, No Breech lie, Gestational age is < 37 weeks +  is JEISON GROUP 10    Brief History:  Tamika Phelan is a 33 y.o.  who was admitted at 36w1d for RLTCS and BS in the setting of  labor.    Operative Findings:  1. Viable female  at 36w1d with APGARs of 7 and 8 at 1 and 5 minutes. Fetus weighted 6lb 13.3oz.  2. Normal intact placenta with centrally inserted 3VC.  Clear amniotic fluid.  3. Large, globular uterus consistent with adenomyosis.  Adhesions present between fimbriated end of fallopian tubes and adjacent tissue.  4. Normal ovaries bilaterally.    Umbilical Cord Venous Blood Gas:  Results from last 7 days   Lab Units 24  0158   PH COV  7.386   PCO2 COV mm HG  35.6   HCO3 COV mmol/L 20.9   BASE EXC COV mmol/L -3.5*   O2 CT CD VB mL/dL 13.3   O2 HGB, VENOUS CORD % 73.4     Umbilical Cord Arterial Blood Gas:  Results from last 7 days   Lab Units 05/03/24  0158   PH COA  7.282   PCO2 COA  48.6   PO2 COA mm HG 30.0*   HCO3 COA mmol/L 22.4   BASE EXC COA mmol/L -4.5*   O2 CONTENT CORD ART ml/dl 12.4   O2 HGB, ARTERIAL CORD % 68.5       Procedure:   The patient was taken to the operating room.  Spinal anesthesia was adequately established and Ancef/azithromycin were given for preoperative prophylaxis. The patient was then placed in a supine position with a left lateral tilt.  Chlorhexidine was used for vaginal prep and a guaman catheter was placed in sterile fashion. Fetal heart tones were noted to be category 1. The patient' abdomen was then prepped with chloraprep and allowed to dry prior to draping in the usual sterile fashion for a Pfannenstiel skin incision.      A time out was performed to confirm correct patient and correct procedure.     An incision was made in the skin with a surgical scalpel and sharp dissection was carried out over subsequent layers of tissue including the fascia, followed by the Bovie electrocautery for hemostasis. The fascia was incised at the midline and the fascial incision was extended bilaterally using the curved Zarate scissors.      The superior edge of the fascial incision was grasped with Kocher clamps, tented up and the underlying rectus muscles were dissected off bluntly and sharply using the scalpel. The rectus muscles were then divided at midline and the peritoneum was identified, tented up at its upper margin with hemostats taking care to avoid the bladder, and then entered sharply using Metzenbaum scissors. The peritoneal incision was then extended with blunt traction.    The bladder blade was inserted and a transverse incision was made in the lower uterine segment using a new surgical blade. The uterine incision was extended cephalad  with blunt dissection. The amniotic sac was entered and the amniotic fluid was noted to be clear.    The surgeon's hand was placed into the uterine cavity. The fetus was noted to be in vertex (occiput posterior) presentation and the presenting part was grasped and delivered through the uterine incision with the assistance of fundal pressure. After delivery the cord was doubly clamped and cut after delayed cord clamping. The infant was then passed off the table to the awaiting  staff.  Venous and arterial blood gas, cord blood, and portion of cord was obtained for analysis and routine blood testing. The placenta then delivered spontaneously and was noted to be intact with a normally inserted three-vessel cord. Oxytocin was administered by IV infusion to enhance uterine contraction. The uterus was exteriorized and cleared of all clots and debris by blunt curretage with a moist lap sponge. The uterine incision was reapproximated using a 0 Monocryl in a running locked fashion. A horizontal imbricating stitch with the same suture was applied. The uterine incision was examined and noted to be hemostatic.      The right fallopian tube was grasped at its fimbriated end with a Randolph and elevated to visualize the mesosalpinx.  Adhesions between the fimbria and ovary were taken down with electrocautery.  Windows were then created through the avascular portions of the mesosalpinx, working proximally and taking care to avoid ovarian vasculature. Plain gut suture was used to secure the pedicles prior to amputating the fallopian tube with electrocautery. Attention was then turned to the contralateral tube, which was amputated in similar fashion. Both fallopian tubes were removed and sent to pathology.  Excellent hemostasis was confirmed following salpingectomy.    The uterus was replaced into the abdomen and the paracolic gutters were cleared of all clots.  Nu-Knit was placed over an area of denuded peritoneal tissue  along the left pelvic sidewall. The uterine incision and bilateral salpingectomy sites were once again reexamined and noted to be hemostatic. The fascia was then reapproximated using 0 Vicryl in a running nonlocked fashion. The subcutaneous tissue was irrigated and cleared of all clots and debris. Good hemostasis was achieved with Bovie electrocautery. The subcutaneous tissue was re-approximated with plain gut suture. The skin incision was closed with 4-0 Monocryl STRATAFIX. Good hemostasis was noted.  Exofin was placed on top of the skin incision in a sterile fashion as a surgical dressing. All needle, sponge, and instrument counts were noted to be correct x 2 at the end of the procedure.    The patient was then cleansed and transferred to the recovery room. Dr. Tsang was present for the entire procedure.           SIGNATURE: Estephanie Atkins MD  DATE: May 3, 2024  TIME: 3:33 AM

## 2024-05-03 NOTE — ASSESSMENT & PLAN NOTE
Continue routine post partum care       , HgB 10.0 -> 9.6, venofer ordered  Pain well controlled: tylenol/motrin scheduled, sherron prn  Lochia within normal limits: continue to monitor   OOB: as able, encourage ambulation  Passing flatus and has had a BM  Voiding spontaneously  DVT ppx: SCD, Lovenox 40mg  Encourage breastfeeding  Contraception: s/p salpingectomy  Baby in: room  Dispo: anticipate d/c home POD2-4

## 2024-05-03 NOTE — PLAN OF CARE
Problem: PAIN - ADULT  Goal: Verbalizes/displays adequate comfort level or baseline comfort level  Description: Interventions:  - Encourage patient to monitor pain and request assistance  - Assess pain using appropriate pain scale  - Administer analgesics based on type and severity of pain and evaluate response  - Implement non-pharmacological measures as appropriate and evaluate response  - Consider cultural and social influences on pain and pain management  - Notify physician/advanced practitioner if interventions unsuccessful or patient reports new pain  Outcome: Progressing     Problem: INFECTION - ADULT  Goal: Absence or prevention of progression during hospitalization  Description: INTERVENTIONS:  - Assess and monitor for signs and symptoms of infection  - Monitor lab/diagnostic results  - Monitor all insertion sites, i.e. indwelling lines, tubes, and drains  - Monitor endotracheal if appropriate and nasal secretions for changes in amount and color  - Mobile appropriate cooling/warming therapies per order  - Administer medications as ordered  - Instruct and encourage patient and family to use good hand hygiene technique  - Identify and instruct in appropriate isolation precautions for identified infection/condition  Outcome: Progressing  Goal: Absence of fever/infection during neutropenic period  Description: INTERVENTIONS:  - Monitor WBC    Outcome: Progressing     Problem: SAFETY ADULT  Goal: Patient will remain free of falls  Description: INTERVENTIONS:  - Educate patient/family on patient safety including physical limitations  - Instruct patient to call for assistance with activity   - Consult OT/PT to assist with strengthening/mobility   - Keep Call bell within reach  - Keep bed low and locked with side rails adjusted as appropriate  - Keep care items and personal belongings within reach  - Initiate and maintain comfort rounds  Outcome: Progressing  Goal: Maintain or return to baseline ADL  function  Description: INTERVENTIONS:  -  Assess patient's ability to carry out ADLs; assess patient's baseline for ADL function and identify physical deficits which impact ability to perform ADLs (bathing, care of mouth/teeth, toileting, grooming, dressing, etc.)  - Assess/evaluate cause of self-care deficits   - Assess range of motion  - Assess patient's mobility; develop plan if impaired  - Assess patient's need for assistive devices and provide as appropriate  - Encourage maximum independence but intervene and supervise when necessary  - Involve family in performance of ADLs  - Assess for home care needs following discharge   - Consider OT consult to assist with ADL evaluation and planning for discharge  - Provide patient education as appropriate  Outcome: Progressing  Goal: Maintains/Returns to pre admission functional level  Description: INTERVENTIONS:  - Perform AM-PAC 6 Click Basic Mobility/ Daily Activity assessment daily.  - Set and communicate daily mobility goal to care team and patient/family/caregiver.   - Out of bed for toileting  - Record patient progress and toleration of activity level   Outcome: Progressing     Problem: Knowledge Deficit  Goal: Patient/family/caregiver demonstrates understanding of disease process, treatment plan, medications, and discharge instructions  Description: Complete learning assessment and assess knowledge base.  Interventions:  - Provide teaching at level of understanding  - Provide teaching via preferred learning methods  Outcome: Progressing     Problem: DISCHARGE PLANNING  Goal: Discharge to home or other facility with appropriate resources  Description: INTERVENTIONS:  - Identify barriers to discharge w/patient and caregiver  - Arrange for needed discharge resources and transportation as appropriate  - Identify discharge learning needs (meds, wound care, etc.)  - Arrange for interpretive services to assist at discharge as needed  - Refer to Case Management  Department for coordinating discharge planning if the patient needs post-hospital services based on physician/advanced practitioner order or complex needs related to functional status, cognitive ability, or social support system  Outcome: Progressing     Problem: POSTPARTUM  Goal: Experiences normal postpartum course  Description: INTERVENTIONS:  - Monitor maternal vital signs  - Assess uterine involution and lochia  Outcome: Progressing  Goal: Appropriate maternal -  bonding  Description: INTERVENTIONS:  - Identify family support  - Assess for appropriate maternal/infant bonding   -Encourage maternal/infant bonding opportunities  - Referral to  or  as needed  Outcome: Progressing  Goal: Establishment of infant feeding pattern  Description: INTERVENTIONS:  - Assess breast pumping  - Refer to lactation as needed  Outcome: Progressing  Goal: Incision(s), wounds(s) or drain site(s) healing without S/S of infection  Description: INTERVENTIONS  - Assess and document dressing, incision, wound bed, drain sites and surrounding tissue  - Provide patient and family education    Outcome: Progressing

## 2024-05-04 PROCEDURE — 99024 POSTOP FOLLOW-UP VISIT: CPT | Performed by: OBSTETRICS & GYNECOLOGY

## 2024-05-04 RX ADMIN — ACETAMINOPHEN 650 MG: 325 TABLET, FILM COATED ORAL at 05:23

## 2024-05-04 RX ADMIN — OXYCODONE HYDROCHLORIDE 5 MG: 5 TABLET ORAL at 15:47

## 2024-05-04 RX ADMIN — SIMETHICONE 80 MG: 80 TABLET, CHEWABLE ORAL at 14:22

## 2024-05-04 RX ADMIN — DOCUSATE SODIUM 100 MG: 100 CAPSULE, LIQUID FILLED ORAL at 14:18

## 2024-05-04 RX ADMIN — FAMOTIDINE 20 MG: 20 TABLET ORAL at 14:18

## 2024-05-04 RX ADMIN — IRON SUCROSE 200 MG: 20 INJECTION, SOLUTION INTRAVENOUS at 15:47

## 2024-05-04 RX ADMIN — SIMETHICONE 80 MG: 80 TABLET, CHEWABLE ORAL at 20:30

## 2024-05-04 RX ADMIN — SIMETHICONE 80 MG: 80 TABLET, CHEWABLE ORAL at 05:28

## 2024-05-04 RX ADMIN — ACETAMINOPHEN 650 MG: 325 TABLET, FILM COATED ORAL at 20:24

## 2024-05-04 RX ADMIN — ACETAMINOPHEN 650 MG: 325 TABLET, FILM COATED ORAL at 14:18

## 2024-05-04 RX ADMIN — ENOXAPARIN SODIUM 40 MG: 40 INJECTION SUBCUTANEOUS at 20:30

## 2024-05-04 NOTE — PLAN OF CARE
Problem: PAIN - ADULT  Goal: Verbalizes/displays adequate comfort level or baseline comfort level  Description: Interventions:  - Encourage patient to monitor pain and request assistance  - Assess pain using appropriate pain scale  - Administer analgesics based on type and severity of pain and evaluate response  - Implement non-pharmacological measures as appropriate and evaluate response  - Consider cultural and social influences on pain and pain management  - Notify physician/advanced practitioner if interventions unsuccessful or patient reports new pain  Outcome: Progressing     Problem: INFECTION - ADULT  Goal: Absence or prevention of progression during hospitalization  Description: INTERVENTIONS:  - Assess and monitor for signs and symptoms of infection  - Monitor lab/diagnostic results  - Monitor all insertion sites, i.e. indwelling lines, tubes, and drains  - Monitor endotracheal if appropriate and nasal secretions for changes in amount and color  - [place] appropriate cooling/warming therapies per order  - Administer medications as ordered  - Instruct and encourage patient and family to use good hand hygiene technique  - Identify and instruct in appropriate isolation precautions for identified infection/condition  Outcome: Progressing  Goal: Absence of fever/infection during neutropenic period  Description: INTERVENTIONS:  - Monitor WBC    Outcome: Progressing     Problem: SAFETY ADULT  Goal: Patient will remain free of falls  Description: INTERVENTIONS:  - Educate patient/family on patient safety including physical limitations  - Instruct patient to call for assistance with activity   - Consult OT/PT to assist with strengthening/mobility   - Keep Call bell within reach  - Keep bed low and locked with side rails adjusted as appropriate  - Keep care items and personal belongings within reach  - Initiate and maintain comfort rounds  Outcome: Progressing  Goal: Maintain or return to baseline ADL  function  Description: INTERVENTIONS:  -  Assess patient's ability to carry out ADLs; assess patient's baseline for ADL function and identify physical deficits which impact ability to perform ADLs (bathing, care of mouth/teeth, toileting, grooming, dressing, etc.)  - Assess/evaluate cause of self-care deficits   - Assess range of motion  - Assess patient's mobility; develop plan if impaired  - Assess patient's need for assistive devices and provide as appropriate  - Encourage maximum independence but intervene and supervise when necessary  - Involve family in performance of ADLs  - Assess for home care needs following discharge   - Consider OT consult to assist with ADL evaluation and planning for discharge  - Provide patient education as appropriate  Outcome: Progressing  Goal: Maintains/Returns to pre admission functional level  Description: INTERVENTIONS:  - Perform AM-PAC 6 Click Basic Mobility/ Daily Activity assessment daily.  - Set and communicate daily mobility goal to care team and patient/family/caregiver.   - Out of bed for toileting  - Record patient progress and toleration of activity level   Outcome: Progressing     Problem: Knowledge Deficit  Goal: Patient/family/caregiver demonstrates understanding of disease process, treatment plan, medications, and discharge instructions  Description: Complete learning assessment and assess knowledge base.  Interventions:  - Provide teaching at level of understanding  - Provide teaching via preferred learning methods  Outcome: Progressing     Problem: DISCHARGE PLANNING  Goal: Discharge to home or other facility with appropriate resources  Description: INTERVENTIONS:  - Identify barriers to discharge w/patient and caregiver  - Arrange for needed discharge resources and transportation as appropriate  - Identify discharge learning needs (meds, wound care, etc.)  - Arrange for interpretive services to assist at discharge as needed  - Refer to Case Management  Department for coordinating discharge planning if the patient needs post-hospital services based on physician/advanced practitioner order or complex needs related to functional status, cognitive ability, or social support system  Outcome: Progressing     Problem: POSTPARTUM  Goal: Experiences normal postpartum course  Description: INTERVENTIONS:  - Monitor maternal vital signs  - Assess uterine involution and lochia  Outcome: Progressing  Goal: Appropriate maternal -  bonding  Description: INTERVENTIONS:  - Identify family support  - Assess for appropriate maternal/infant bonding   -Encourage maternal/infant bonding opportunities  - Referral to  or  as needed  Outcome: Progressing  Goal: Establishment of infant feeding pattern  Description: INTERVENTIONS:  - Assess breast pumping  - Refer to lactation as needed  Outcome: Progressing  Goal: Incision(s), wounds(s) or drain site(s) healing without S/S of infection  Description: INTERVENTIONS  - Assess and document dressing, incision, wound bed, drain sites and surrounding tissue  - Provide patient and family education  - Perform skin care/dressing changes every   Outcome: Progressing

## 2024-05-04 NOTE — PROGRESS NOTES
Obstetrics Progress Note  Tamika Phelan 33 y.o. female MRN: [id number]  Unit/Bed#: -01 Encounter: [id number]    Assessment/Plan:    Postpartum Day #1 s/p RLTCS at 36 weeks due to  labor + bilateral salpingectomy delivery, currently stable. Baby in room. By issue:    * Status post repeat low transverse  section  Assessment & Plan       Continue routine post partum care       , HgB 10.0 -> 9.6, venofer ordered  Pain well controlled: tylenol/motrin scheduled, sherron prn  Lochia within normal limits: continue to monitor   OOB: as able, encourage ambulation  Passing flatus  Voiding spontaneously  DVT ppx: SCD, Lovenox 40mg  Encourage breastfeeding  Contraception: s/p salpingectomy  Baby in: room  Dispo: anticipate d/c home POD2-4      Elevated blood pressure reading  Assessment & Plan  Pressure 111/90 in triage  Labs wnl  Normotensive throughout labor  Asymptomatic at this time  Continue to monitor      Status post bilateral salpingectomy  Assessment & Plan  MA-31 not needed  Will proceed with BTL at time of RLTCS        Subjective/Objective     Subjective:   No acute events overnight. Pain: controlled. Tolerating PO: yes. Voiding: yes. Flatus: passing. Ambulating: yes. Chest pain: no. Shortness of breath: no. Leg pain: no. Lochia: within normal limits. Baby in room, feeding via breast.    Objective:   Vitals:   Temp:  [97.5 °F (36.4 °C)-98.2 °F (36.8 °C)] 97.8 °F (36.6 °C)  HR:  [70-91] 89  Resp:  [17-20] 17  BP: ()/(55-67) 99/62       Intake/Output Summary (Last 24 hours) at 2024 0944  Last data filed at 5/3/2024 2114  Gross per 24 hour   Intake 2736.46 ml   Output 1325 ml   Net 1411.46 ml       Lab Results   Component Value Date    WBC 18.55 (H) 2024    HGB 9.6 (L) 2024    HCT 29.7 (L) 2024    MCV 88 2024     2024       Physical Exam:   General: alert and oriented x3, in no apparent distress  Cardiovascular: regular rate  Pulmonary:  normal effort  Abdomen: Soft, non-tender, non-distended, no rebound or guarding. Uterine fundus firm and non-tender, at the umbilicus  Incision: clean/dry/intact  Extremities: Non tender with no edema      Juju Galvez MD  PGY-I, OBGYN  5/4/2024, 9:44 AM

## 2024-05-04 NOTE — CASE MANAGEMENT
Case Management Progress Note    Patient name Tamika Phelan  Location /-01 N [id number]  : 1990 Date 2024       LOS (days): 1  Geometric Mean LOS (GMLOS) (days):   Days to GMLOS:        OBJECTIVE:        Current admission status: Inpatient  Preferred Pharmacy:   [id number] - [place] - [address]  [address] [place] [address]  Phone: [phone] Fax: [phone]    Primary Care Provider: Claire Daly MD    Primary Insurance: [employer]  Secondary Insurance:     PROGRESS NOTE:      CM met with MOB to introduce CM services, complete assessment, and provide CM contact info.      MOB reported the following:    Assessment:  Consult reason: NICU Admission  Gestational Age at Birth: 36 Weeks + 2 Days  MOB Name (& age if teen): Tamika Mcduffie    FOB Name (& age if teen MOB):   Dominick Hernandez   Other Legal Guardian(s) for Baby:      Other Children:   5 other children  Housing Plan/Lives with:   MOB and FOB  Insurance Coverage/Plan for Baby: MOB verbalizes that they will contact their insurance to add baby ASAP.   Support System: Family, Friends, and Spouse/Significant Other  Care Items: Car Seat, Crib/Bassinet (Safe Sleep Space), Diapers/Wipes, and Clothing  Method of Feeding: Breast Feeding  Breast Pump: Breast pump obtained prior to admission  Government Assistance Programs: None   Arrangements: MOB  Current Employment/Schooling: MOB employed Full Time and FOB employed Full Time  Mental Health History and/or Treatment:     Substance Use History and/or Treatment:     Urine Drug Screen Results: Negative  Children & Youth History: None  Current Legal Issues: N/A  Domestic/Intimate Partner Violence History: Denies History.  NICU Resources: NICU Packet Provided    Discharge Plan:  Pediatrician:  Dr. Rufus Bernal   Prenatal/ Care:  TBD  Follow-Up Appointments Needed/Scheduled:  TBD  Medications/DME/Other Referrals: TBD   Transportation Plan: Family has a vehicle    Follow-Up Needed from Care Management:       Cm met with MOB to review Cm role. MOB lives at home with spouse and 5 other children. She reports having everything needed for baby. CM provided NICU packet as well as [employer] flyer for NICU family meetings. MOB and baby cleared of any CM needs at this time.

## 2024-05-04 NOTE — PLAN OF CARE
Problem: PAIN - ADULT  Goal: Verbalizes/displays adequate comfort level or baseline comfort level  Description: Interventions:  - Encourage patient to monitor pain and request assistance  - Assess pain using appropriate pain scale  - Administer analgesics based on type and severity of pain and evaluate response  - Implement non-pharmacological measures as appropriate and evaluate response  - Consider cultural and social influences on pain and pain management  - Notify physician/advanced practitioner if interventions unsuccessful or patient reports new pain  Outcome: Progressing     Problem: INFECTION - ADULT  Goal: Absence or prevention of progression during hospitalization  Description: INTERVENTIONS:  - Assess and monitor for signs and symptoms of infection  - Monitor lab/diagnostic results  - Monitor all insertion sites, i.e. indwelling lines, tubes, and drains  - Monitor endotracheal if appropriate and nasal secretions for changes in amount and color  - [place] appropriate cooling/warming therapies per order  - Administer medications as ordered  - Instruct and encourage patient and family to use good hand hygiene technique  - Identify and instruct in appropriate isolation precautions for identified infection/condition  Outcome: Progressing  Goal: Absence of fever/infection during neutropenic period  Description: INTERVENTIONS:  - Monitor WBC    Outcome: Progressing     Problem: SAFETY ADULT  Goal: Patient will remain free of falls  Description: INTERVENTIONS:  - Educate patient/family on patient safety including physical limitations  - Instruct patient to call for assistance with activity   - Consult OT/PT to assist with strengthening/mobility   - Keep Call bell within reach  - Keep bed low and locked with side rails adjusted as appropriate  - Keep care items and personal belongings within reach  - Initiate and maintain comfort rounds  Outcome: Progressing  Goal: Maintain or return to baseline ADL  function  Description: INTERVENTIONS:  -  Assess patient's ability to carry out ADLs; assess patient's baseline for ADL function and identify physical deficits which impact ability to perform ADLs (bathing, care of mouth/teeth, toileting, grooming, dressing, etc.)  - Assess/evaluate cause of self-care deficits   - Assess range of motion  - Assess patient's mobility; develop plan if impaired  - Assess patient's need for assistive devices and provide as appropriate  - Encourage maximum independence but intervene and supervise when necessary  - Involve family in performance of ADLs  - Assess for home care needs following discharge   - Consider OT consult to assist with ADL evaluation and planning for discharge  - Provide patient education as appropriate  Outcome: Progressing  Goal: Maintains/Returns to pre admission functional level  Description: INTERVENTIONS:  - Perform AM-PAC 6 Click Basic Mobility/ Daily Activity assessment daily.  - Set and communicate daily mobility goal to care team and patient/family/caregiver.   - Out of bed for toileting  - Record patient progress and toleration of activity level   Outcome: Progressing     Problem: Knowledge Deficit  Goal: Patient/family/caregiver demonstrates understanding of disease process, treatment plan, medications, and discharge instructions  Description: Complete learning assessment and assess knowledge base.  Interventions:  - Provide teaching at level of understanding  - Provide teaching via preferred learning methods  Outcome: Progressing     Problem: DISCHARGE PLANNING  Goal: Discharge to home or other facility with appropriate resources  Description: INTERVENTIONS:  - Identify barriers to discharge w/patient and caregiver  - Arrange for needed discharge resources and transportation as appropriate  - Identify discharge learning needs (meds, wound care, etc.)  - Arrange for interpretive services to assist at discharge as needed  - Refer to Case Management  Department for coordinating discharge planning if the patient needs post-hospital services based on physician/advanced practitioner order or complex needs related to functional status, cognitive ability, or social support system  Outcome: Progressing     Problem: POSTPARTUM  Goal: Experiences normal postpartum course  Description: INTERVENTIONS:  - Monitor maternal vital signs  - Assess uterine involution and lochia  Outcome: Progressing  Goal: Appropriate maternal -  bonding  Description: INTERVENTIONS:  - Identify family support  - Assess for appropriate maternal/infant bonding   -Encourage maternal/infant bonding opportunities  - Referral to  or  as needed  Outcome: Progressing  Goal: Establishment of infant feeding pattern  Description: INTERVENTIONS:  - Assess breast pumping  - Refer to lactation as needed  Outcome: Progressing  Goal: Incision(s), wounds(s) or drain site(s) healing without S/S of infection  Description: INTERVENTIONS  - Assess and document dressing, incision, wound bed, drain sites and surrounding tissue  - Provide patient and family education  Outcome: Progressing

## 2024-05-04 NOTE — PLAN OF CARE
Problem: PAIN - ADULT  Goal: Verbalizes/displays adequate comfort level or baseline comfort level  Description: Interventions:  - Encourage patient to monitor pain and request assistance  - Assess pain using appropriate pain scale  - Administer analgesics based on type and severity of pain and evaluate response  - Implement non-pharmacological measures as appropriate and evaluate response  - Consider cultural and social influences on pain and pain management  - Notify physician/advanced practitioner if interventions unsuccessful or patient reports new pain  Outcome: Progressing     Problem: INFECTION - ADULT  Goal: Absence or prevention of progression during hospitalization  Description: INTERVENTIONS:  - Assess and monitor for signs and symptoms of infection  - Monitor lab/diagnostic results  - Monitor all insertion sites, i.e. indwelling lines, tubes, and drains  - Monitor endotracheal if appropriate and nasal secretions for changes in amount and color  - [place] appropriate cooling/warming therapies per order  - Administer medications as ordered  - Instruct and encourage patient and family to use good hand hygiene technique  - Identify and instruct in appropriate isolation precautions for identified infection/condition  Outcome: Progressing  Goal: Absence of fever/infection during neutropenic period  Description: INTERVENTIONS:  - Monitor WBC    Outcome: Progressing     Problem: SAFETY ADULT  Goal: Patient will remain free of falls  Description: INTERVENTIONS:  - Educate patient/family on patient safety including physical limitations  - Instruct patient to call for assistance with activity   - Consult OT/PT to assist with strengthening/mobility   - Keep Call bell within reach  - Keep bed low and locked with side rails adjusted as appropriate  - Keep care items and personal belongings within reach  - Initiate and maintain comfort rounds  Outcome: Progressing  Goal: Maintain or return to baseline ADL  function  Description: INTERVENTIONS:  -  Assess patient's ability to carry out ADLs; assess patient's baseline for ADL function and identify physical deficits which impact ability to perform ADLs (bathing, care of mouth/teeth, toileting, grooming, dressing, etc.)  - Assess/evaluate cause of self-care deficits   - Assess range of motion  - Assess patient's mobility; develop plan if impaired  - Assess patient's need for assistive devices and provide as appropriate  - Encourage maximum independence but intervene and supervise when necessary  - Involve family in performance of ADLs  - Assess for home care needs following discharge   - Consider OT consult to assist with ADL evaluation and planning for discharge  - Provide patient education as appropriate  Outcome: Progressing  Goal: Maintains/Returns to pre admission functional level  Description: INTERVENTIONS:  - Perform AM-PAC 6 Click Basic Mobility/ Daily Activity assessment daily.  - Set and communicate daily mobility goal to care team and patient/family/caregiver.   - Out of bed for toileting  - Record patient progress and toleration of activity level   Outcome: Progressing     Problem: Knowledge Deficit  Goal: Patient/family/caregiver demonstrates understanding of disease process, treatment plan, medications, and discharge instructions  Description: Complete learning assessment and assess knowledge base.  Interventions:  - Provide teaching at level of understanding  - Provide teaching via preferred learning methods  Outcome: Progressing     Problem: DISCHARGE PLANNING  Goal: Discharge to home or other facility with appropriate resources  Description: INTERVENTIONS:  - Identify barriers to discharge w/patient and caregiver  - Arrange for needed discharge resources and transportation as appropriate  - Identify discharge learning needs (meds, wound care, etc.)  - Arrange for interpretive services to assist at discharge as needed  - Refer to Case Management  Department for coordinating discharge planning if the patient needs post-hospital services based on physician/advanced practitioner order or complex needs related to functional status, cognitive ability, or social support system  Outcome: Progressing     Problem: POSTPARTUM  Goal: Experiences normal postpartum course  Description: INTERVENTIONS:  - Monitor maternal vital signs  - Assess uterine involution and lochia  Outcome: Progressing  Goal: Appropriate maternal -  bonding  Description: INTERVENTIONS:  - Identify family support  - Assess for appropriate maternal/infant bonding   -Encourage maternal/infant bonding opportunities  - Referral to  or  as needed  Outcome: Progressing  Goal: Establishment of infant feeding pattern  Description: INTERVENTIONS:  - Assess breast pumping  - Refer to lactation as needed  Outcome: Progressing  Goal: Incision(s), wounds(s) or drain site(s) healing without S/S of infection  Description: INTERVENTIONS  - Assess and document dressing, incision, wound bed, drain sites and surrounding tissue  - Provide patient and family education  - Perform skin care/dressing changes every   Outcome: Progressing

## 2024-05-05 PROCEDURE — 99024 POSTOP FOLLOW-UP VISIT: CPT | Performed by: OBSTETRICS & GYNECOLOGY

## 2024-05-05 RX ADMIN — DOCUSATE SODIUM 100 MG: 100 CAPSULE, LIQUID FILLED ORAL at 18:43

## 2024-05-05 RX ADMIN — ACETAMINOPHEN 650 MG: 325 TABLET, FILM COATED ORAL at 02:47

## 2024-05-05 RX ADMIN — ENOXAPARIN SODIUM 40 MG: 40 INJECTION SUBCUTANEOUS at 20:52

## 2024-05-05 RX ADMIN — FAMOTIDINE 20 MG: 20 TABLET ORAL at 18:43

## 2024-05-05 RX ADMIN — DOCUSATE SODIUM 100 MG: 100 CAPSULE, LIQUID FILLED ORAL at 09:40

## 2024-05-05 RX ADMIN — ACETAMINOPHEN 650 MG: 325 TABLET, FILM COATED ORAL at 22:48

## 2024-05-05 RX ADMIN — CALCIUM CARBONATE (ANTACID) CHEW TAB 500 MG 1000 MG: 500 CHEW TAB at 20:52

## 2024-05-05 RX ADMIN — FAMOTIDINE 20 MG: 20 TABLET ORAL at 09:40

## 2024-05-05 RX ADMIN — ACETAMINOPHEN 650 MG: 325 TABLET, FILM COATED ORAL at 09:40

## 2024-05-05 RX ADMIN — OXYCODONE HYDROCHLORIDE 5 MG: 5 TABLET ORAL at 18:45

## 2024-05-05 RX ADMIN — OXYCODONE HYDROCHLORIDE 5 MG: 5 TABLET ORAL at 00:28

## 2024-05-05 RX ADMIN — SIMETHICONE 80 MG: 80 TABLET, CHEWABLE ORAL at 20:52

## 2024-05-05 RX ADMIN — ACETAMINOPHEN 650 MG: 325 TABLET, FILM COATED ORAL at 16:13

## 2024-05-05 NOTE — PLAN OF CARE
Problem: PAIN - ADULT  Goal: Verbalizes/displays adequate comfort level or baseline comfort level  Description: Interventions:  - Encourage patient to monitor pain and request assistance  - Assess pain using appropriate pain scale  - Administer analgesics based on type and severity of pain and evaluate response  - Implement non-pharmacological measures as appropriate and evaluate response  - Consider cultural and social influences on pain and pain management  - Notify physician/advanced practitioner if interventions unsuccessful or patient reports new pain  Outcome: Progressing     Problem: INFECTION - ADULT  Goal: Absence or prevention of progression during hospitalization  Description: INTERVENTIONS:  - Assess and monitor for signs and symptoms of infection  - Monitor lab/diagnostic results  - Monitor all insertion sites, i.e. indwelling lines, tubes, and drains  - Monitor endotracheal if appropriate and nasal secretions for changes in amount and color  - [place] appropriate cooling/warming therapies per order  - Administer medications as ordered  - Instruct and encourage patient and family to use good hand hygiene technique  - Identify and instruct in appropriate isolation precautions for identified infection/condition  Outcome: Progressing  Goal: Absence of fever/infection during neutropenic period  Description: INTERVENTIONS:  - Monitor WBC    Outcome: Progressing     Problem: SAFETY ADULT  Goal: Patient will remain free of falls  Description: INTERVENTIONS:  - Educate patient/family on patient safety including physical limitations  - Instruct patient to call for assistance with activity   - Consult OT/PT to assist with strengthening/mobility   - Keep Call bell within reach  - Keep bed low and locked with side rails adjusted as appropriate  - Keep care items and personal belongings within reach  - Initiate and maintain comfort rounds  Outcome: Progressing  Goal: Maintain or return to baseline ADL  function  Description: INTERVENTIONS:  -  Assess patient's ability to carry out ADLs; assess patient's baseline for ADL function and identify physical deficits which impact ability to perform ADLs (bathing, care of mouth/teeth, toileting, grooming, dressing, etc.)  - Assess/evaluate cause of self-care deficits   - Assess range of motion  - Assess patient's mobility; develop plan if impaired  - Assess patient's need for assistive devices and provide as appropriate  - Encourage maximum independence but intervene and supervise when necessary  - Involve family in performance of ADLs  - Assess for home care needs following discharge   - Consider OT consult to assist with ADL evaluation and planning for discharge  - Provide patient education as appropriate  Outcome: Progressing  Goal: Maintains/Returns to pre admission functional level  Description: INTERVENTIONS:  - Perform AM-PAC 6 Click Basic Mobility/ Daily Activity assessment daily.  - Set and communicate daily mobility goal to care team and patient/family/caregiver.   - Out of bed for toileting  - Record patient progress and toleration of activity level   Outcome: Progressing     Problem: Knowledge Deficit  Goal: Patient/family/caregiver demonstrates understanding of disease process, treatment plan, medications, and discharge instructions  Description: Complete learning assessment and assess knowledge base.  Interventions:  - Provide teaching at level of understanding  - Provide teaching via preferred learning methods  Outcome: Progressing     Problem: DISCHARGE PLANNING  Goal: Discharge to home or other facility with appropriate resources  Description: INTERVENTIONS:  - Identify barriers to discharge w/patient and caregiver  - Arrange for needed discharge resources and transportation as appropriate  - Identify discharge learning needs (meds, wound care, etc.)  - Arrange for interpretive services to assist at discharge as needed  - Refer to Case Management  Department for coordinating discharge planning if the patient needs post-hospital services based on physician/advanced practitioner order or complex needs related to functional status, cognitive ability, or social support system  Outcome: Progressing     Problem: POSTPARTUM  Goal: Experiences normal postpartum course  Description: INTERVENTIONS:  - Monitor maternal vital signs  - Assess uterine involution and lochia  Outcome: Progressing  Goal: Appropriate maternal -  bonding  Description: INTERVENTIONS:  - Identify family support  - Assess for appropriate maternal/infant bonding   -Encourage maternal/infant bonding opportunities  - Referral to  or  as needed  Outcome: Progressing  Goal: Establishment of infant feeding pattern  Description: INTERVENTIONS:  - Assess breast pumping  - Refer to lactation as needed  Outcome: Progressing  Goal: Incision(s), wounds(s) or drain site(s) healing without S/S of infection  Description: INTERVENTIONS  - Assess and document dressing, incision, wound bed, drain sites and surrounding tissue  - Provide patient and family education  Outcome: Progressing

## 2024-05-05 NOTE — PROGRESS NOTES
Obstetrics Progress Note  Tamika Phelan 33 y.o. female MRN: [id number]  Unit/Bed#:  325-01 Encounter: [id number]    Assessment/Plan:    Postpartum Day #2 s/p RLTCS at 36 weeks due to  labor + bilateral salpingectomy delivery, currently stable. Baby in room. By issue:   * Status post repeat low transverse  section  Assessment & Plan       Continue routine post partum care       , HgB 10.0 -> 9.6, venofer ordered  Pain well controlled: tylenol/motrin scheduled, sherron prn  Lochia within normal limits: continue to monitor   OOB: as able, encourage ambulation  Passing flatus  Voiding spontaneously  DVT ppx: SCD, Lovenox 40mg  Encourage breastfeeding  Contraception: s/p salpingectomy  Baby in: room  Dispo: anticipate d/c home POD2-4      Elevated blood pressure reading  Assessment & Plan  Pressure 111/90 in triage  Labs wnl  Normotensive throughout labor  Asymptomatic at this time  Continue to monitor      Status post bilateral salpingectomy  Assessment & Plan  MA-31 not needed  Will proceed with BTL at time of RLTCS        Subjective/Objective     Subjective:   Patient complaining of R-sided rib pain with deep inspiration. Heating pad has helped, but she is very anxious because it affects her breathing. She denies any chest pain or pressure, palpitations. Pain: controlled. Tolerating PO: yes. Voiding: yes. Flatus: passing. Ambulating: yes. Chest pain: no. Shortness of breath: no. Leg pain: no. Lochia: within normal limits. Baby in room, feeding via breast.    Objective:   Vitals:   Temp:  [97.8 °F (36.6 °C)-98.5 °F (36.9 °C)] 98.2 °F (36.8 °C)  HR:  [83-89] 86  Resp:  [16-20] 16  BP: ()/(58-73) 108/58     No intake or output data in the 24 hours ending 24 0325    Lab Results   Component Value Date    WBC 18.55 (H) 2024    HGB 9.6 (L) 2024    HCT 29.7 (L) 2024    MCV 88 2024     2024       Physical Exam:   General: alert and oriented x3,  in no apparent distress  Cardiovascular: regular rate  Pulmonary: normal effort  Abdomen: Soft, non-tender, non-distended, no rebound or guarding. Uterine fundus firm and non-tender, at the umbilicus  Incision: clean/dry/intact  Extremities: Non tender with minimal edema      Juju Galvez MD  PGY-I, OBGYN  5/5/2024, 3:25 AM

## 2024-05-06 VITALS
BODY MASS INDEX: 38.14 KG/M2 | WEIGHT: 202 LBS | OXYGEN SATURATION: 97 % | HEART RATE: 87 BPM | RESPIRATION RATE: 18 BRPM | HEIGHT: 61 IN | DIASTOLIC BLOOD PRESSURE: 76 MMHG | SYSTOLIC BLOOD PRESSURE: 108 MMHG | TEMPERATURE: 98.1 F

## 2024-05-06 PROCEDURE — 99024 POSTOP FOLLOW-UP VISIT: CPT | Performed by: OBSTETRICS & GYNECOLOGY

## 2024-05-06 PROCEDURE — 88302 TISSUE EXAM BY PATHOLOGIST: CPT | Performed by: STUDENT IN AN ORGANIZED HEALTH CARE EDUCATION/TRAINING PROGRAM

## 2024-05-06 PROCEDURE — NC001 PR NO CHARGE: Performed by: OBSTETRICS & GYNECOLOGY

## 2024-05-06 RX ORDER — OXYCODONE HYDROCHLORIDE 5 MG/1
5 TABLET ORAL EVERY 4 HOURS PRN
Qty: 7 TABLET | Refills: 0 | Status: CANCELLED | OUTPATIENT
Start: 2024-05-06 | End: 2024-05-16

## 2024-05-06 RX ORDER — ACETAMINOPHEN 325 MG/1
650 TABLET ORAL EVERY 6 HOURS SCHEDULED
Start: 2024-05-06

## 2024-05-06 RX ADMIN — ACETAMINOPHEN 650 MG: 325 TABLET, FILM COATED ORAL at 04:26

## 2024-05-06 RX ADMIN — DOCUSATE SODIUM 100 MG: 100 CAPSULE, LIQUID FILLED ORAL at 08:36

## 2024-05-06 RX ADMIN — ACETAMINOPHEN 650 MG: 325 TABLET, FILM COATED ORAL at 10:47

## 2024-05-06 RX ADMIN — FAMOTIDINE 20 MG: 20 TABLET ORAL at 08:36

## 2024-05-06 NOTE — LACTATION NOTE
CONSULT - LACTATION  Tamika Phelan 33 y.o. female MRN: [id number]    [facility] L[facility] Room / Bed: / 325-01 Encounter: [id number]    Maternal Information     MOTHER:  N/A  Maternal Age: This patient's mother is not on file.  OB History: This patient's mother is not on file.  Previouse breast reduction surgery? No    Lactation history:   Has patient previously breast fed: Yes   How long had patient previously breast fed: 4 months to 1 year with 4 other children   Previous breast feeding complications:     This patient's mother is not on file.    Birth information:  YOB: 1990   Time of birth:     Sex: female   Delivery type:     Birth Weight: No birth weight on file.   Percent of Weight Change: Birth weight not on file     Gestational Age: <None>   [unfilled]    Assessment     Breast and nipple assessment: engorged breast     Assessment:  Baby in NICU       24 0900   Lactation Consultation   Reason for Consult 10 minutes;10 minute   Lactation Consultant Total Time 20   Breasts/Nipples   Left Breast Engorged;Filling   Right Breast Engorged;Filling   Left Nipple Everted   Right Nipple Everted   Intervention Breast pump   Breastfeeding Status Yes   Breastfeeding Progress Pumping only   Reasons for not Breastfeeding Infant medical condition   Other OB Lactation Tools   Feeding Devices Pump   Breast Pump   Pump 3;2;1   Patient Follow-Up   Lactation Consult Status 2   Follow-Up Type Inpatient;Call as needed   Other OB Lactation Documentation    Additional Problem Noted Mom is pumping for baby in NICU. Mom pumping every 3 hours. Mom collectings 6-10 oz each pumping session. Mom states her milk came in. Engorged breasts, heat packs to help break up clogged duct. Education given on engorgement. Enc to call for further assistance if needed.        Feeding recommendations:  pump every 2-3 hours  Mom is pumping for baby in NICU. Mom pumping every 3  hours. Mom collectings 6-10 oz each pumping session. Mom states her milk came in and started getting larger volumes. Mom is pumping for 15-20 minutes. Pumped about 25 minutes this am due to having engorged breasts. Education to mom on heat packs and massage to help break up clogged duct. Education given on engorgement. Enc to call for further assistance.     Discussed s/s engorgement, blocked milk ducts, and mastitis. Discussed how to remedy at home and when to contact physician. Reviewed engorgement and ways to reduce symptoms. Use a warmth on breasts with massage prior to feeding / pumping. Use massage during pumping over full ducts. Used cold, compression on breasts after feeding/pumping session. Ideas are rice in a sock. Place one in the freezer for cool and one in the microwave for warmth. Referred to discharge book / engorgement page.    Encouraged pumping every 2-3 hours with hands on expression for the first two weeks/until producing 1 liter of breast milk per day before slowing to 6-8 pumping sessions a day.      Met with mother to go over discharge breastfeeding booklet. Reviewed breastfeeding and your lifestyle, storage and preparation of breast milk, how to keep you breast pump clean, the employed breastfeeding mother and paced bottle feeding handouts. Booklet included Breastfeeding Resources for after discharge including access to the number for the [facility]. Discussed s/s engorgement, blocked milk ducts, and mastitis. Discussed how to remedy at home and when to contact physician.    Encouraged lactation follow up as needed. Resource list provided in [facility] Discharge Breastfeeding Booklet.     Odalis Shoemaker 5/6/2024 9:31 AM

## 2024-05-06 NOTE — PLAN OF CARE
Problem: PAIN - ADULT  Goal: Verbalizes/displays adequate comfort level or baseline comfort level  Description: Interventions:  - Encourage patient to monitor pain and request assistance  - Assess pain using appropriate pain scale  - Administer analgesics based on type and severity of pain and evaluate response  - Implement non-pharmacological measures as appropriate and evaluate response  - Consider cultural and social influences on pain and pain management  - Notify physician/advanced practitioner if interventions unsuccessful or patient reports new pain  Outcome: Progressing     Problem: INFECTION - ADULT  Goal: Absence or prevention of progression during hospitalization  Description: INTERVENTIONS:  - Assess and monitor for signs and symptoms of infection  - Monitor lab/diagnostic results  - Monitor all insertion sites, i.e. indwelling lines, tubes, and drains  - Monitor endotracheal if appropriate and nasal secretions for changes in amount and color  - [place] appropriate cooling/warming therapies per order  - Administer medications as ordered  - Instruct and encourage patient and family to use good hand hygiene technique  - Identify and instruct in appropriate isolation precautions for identified infection/condition  Outcome: Progressing  Goal: Absence of fever/infection during neutropenic period  Description: INTERVENTIONS:  - Monitor WBC    Outcome: Progressing     Problem: SAFETY ADULT  Goal: Patient will remain free of falls  Description: INTERVENTIONS:  - Educate patient/family on patient safety including physical limitations  - Instruct patient to call for assistance with activity   - Consult OT/PT to assist with strengthening/mobility   - Keep Call bell within reach  - Keep bed low and locked with side rails adjusted as appropriate  - Keep care items and personal belongings within reach  - Initiate and maintain comfort rounds  Outcome: Progressing  Goal: Maintain or return to baseline ADL  function  Description: INTERVENTIONS:  -  Assess patient's ability to carry out ADLs; assess patient's baseline for ADL function and identify physical deficits which impact ability to perform ADLs (bathing, care of mouth/teeth, toileting, grooming, dressing, etc.)  - Assess/evaluate cause of self-care deficits   - Assess range of motion  - Assess patient's mobility; develop plan if impaired  - Assess patient's need for assistive devices and provide as appropriate  - Encourage maximum independence but intervene and supervise when necessary  - Involve family in performance of ADLs  - Assess for home care needs following discharge   - Consider OT consult to assist with ADL evaluation and planning for discharge  - Provide patient education as appropriate  Outcome: Progressing  Goal: Maintains/Returns to pre admission functional level  Description: INTERVENTIONS:  - Perform AM-PAC 6 Click Basic Mobility/ Daily Activity assessment daily.  - Set and communicate daily mobility goal to care team and patient/family/caregiver.   - Out of bed for toileting  - Record patient progress and toleration of activity level   Outcome: Progressing     Problem: Knowledge Deficit  Goal: Patient/family/caregiver demonstrates understanding of disease process, treatment plan, medications, and discharge instructions  Description: Complete learning assessment and assess knowledge base.  Interventions:  - Provide teaching at level of understanding  - Provide teaching via preferred learning methods  Outcome: Progressing     Problem: DISCHARGE PLANNING  Goal: Discharge to home or other facility with appropriate resources  Description: INTERVENTIONS:  - Identify barriers to discharge w/patient and caregiver  - Arrange for needed discharge resources and transportation as appropriate  - Identify discharge learning needs (meds, wound care, etc.)  - Arrange for interpretive services to assist at discharge as needed  - Refer to Case Management  Department for coordinating discharge planning if the patient needs post-hospital services based on physician/advanced practitioner order or complex needs related to functional status, cognitive ability, or social support system  Outcome: Progressing     Problem: POSTPARTUM  Goal: Experiences normal postpartum course  Description: INTERVENTIONS:  - Monitor maternal vital signs  - Assess uterine involution and lochia  Outcome: Progressing  Goal: Appropriate maternal -  bonding  Description: INTERVENTIONS:  - Identify family support  - Assess for appropriate maternal/infant bonding   -Encourage maternal/infant bonding opportunities  - Referral to  or  as needed  Outcome: Progressing  Goal: Establishment of infant feeding pattern  Description: INTERVENTIONS:  - Assess breast pumping  - Refer to lactation as needed  Outcome: Progressing  Goal: Incision(s), wounds(s) or drain site(s) healing without S/S of infection  Description: INTERVENTIONS  - Assess and document dressing, incision, wound bed, drain sites and surrounding tissue  - Provide patient and family education  - Perform skin care/dressing changes every outcome: Progressing

## 2024-05-06 NOTE — PROGRESS NOTES
Obstetrics Progress Note  Tamika Phelan 33 y.o. female MRN: [id number]  Unit/Bed#:  325-01 Encounter: [id number]    Assessment/Plan:  Postoperative day #3 s/p repeat low transverse  delivery and bilateral salpingectomy. Stable.  Baby in NICU. By issue:  * Status post repeat low transverse  section  Assessment & Plan       Continue routine post partum care       , HgB 10.0 -> 9.6, venofer ordered  Pain well controlled: tylenol/motrin scheduled, sherron prn  Lochia within normal limits: continue to monitor   OOB: as able, encourage ambulation  Passing flatus and has had a BM  Voiding spontaneously  DVT ppx: SCD, Lovenox 40mg  Encourage breastfeeding  Contraception: s/p salpingectomy  Baby in: room  Dispo: anticipate d/c home POD2-4      Status post bilateral salpingectomy  Assessment & Plan  Performed at the time of cs    Elevated blood pressure reading  Assessment & Plan  Pressure 111/90 in triage  Labs wnl  Normotensive throughout labor  Asymptomatic at this time  Continue to monitor  Systolic (12hrs), Av , Min:108 , Max:115   Diastolic (12hrs), Av, Min:71, Max:76    ]      Anticipate discharge POD #3 vs #4.      Subjective/Objective   Chief Complaint:   Post delivery.     Subjective:   Pain: well controlled. Tolerating PO: yes. Voiding: yes. Flatus: yes. Bowel Movement: yes. Ambulating: yes. Chest pain: no. Shortness of breath: no. Leg pain: no. Lochia: within normal limits. Infant feeding: breast.     Objective:   Vitals:   Temp:  [97.7 °F (36.5 °C)-98.3 °F (36.8 °C)] 97.7 °F (36.5 °C)  HR:  [82-86] 82  Resp:  [17-18] 17  BP: (115-118)/(59-71) 115/71   No intake or output data in the 24 hours ending 24 0617    Physical Exam:   -General: alert and oriented x 3, in no apparent distress  -Cardiovascular: regular rate and rhythm  -Pulmonary: normal effort, clear to auscultation bilaterally  -Abdomen/Pelvis: soft, non-tender, non-distended, no rebound or guarding.  Uterine fundus firm and non-tender, at the umbilicus. Incision: CDI  -Extremities: Nontender    Lab Results   Component Value Date    WBC 18.55 (H) 05/03/2024    HGB 9.6 (L) 05/03/2024    HCT 29.7 (L) 05/03/2024    MCV 88 05/03/2024     05/03/2024         Eleonora Montes MD  PGY-I, OBGYN  5/6/2024, 6:17 AM

## 2024-05-06 NOTE — PLAN OF CARE
Problem: PAIN - ADULT  Goal: Verbalizes/displays adequate comfort level or baseline comfort level  Description: Interventions:  - Encourage patient to monitor pain and request assistance  - Assess pain using appropriate pain scale  - Administer analgesics based on type and severity of pain and evaluate response  - Implement non-pharmacological measures as appropriate and evaluate response  - Consider cultural and social influences on pain and pain management  - Notify physician/advanced practitioner if interventions unsuccessful or patient reports new pain  Outcome: Adequate for Discharge     Problem: INFECTION - ADULT  Goal: Absence or prevention of progression during hospitalization  Description: INTERVENTIONS:  - Assess and monitor for signs and symptoms of infection  - Monitor lab/diagnostic results  - Monitor all insertion sites, i.e. indwelling lines, tubes, and drains  - Monitor endotracheal if appropriate and nasal secretions for changes in amount and color  - [place] appropriate cooling/warming therapies per order  - Administer medications as ordered  - Instruct and encourage patient and family to use good hand hygiene technique  - Identify and instruct in appropriate isolation precautions for identified infection/condition  Outcome: Adequate for Discharge  Goal: Absence of fever/infection during neutropenic period  Description: INTERVENTIONS:  - Monitor WBC    Outcome: Adequate for Discharge     Problem: SAFETY ADULT  Goal: Patient will remain free of falls  Description: INTERVENTIONS:  - Educate patient/family on patient safety including physical limitations  - Instruct patient to call for assistance with activity   - Consult OT/PT to assist with strengthening/mobility   - Keep Call bell within reach  - Keep bed low and locked with side rails adjusted as appropriate  - Keep care items and personal belongings within reach  - Initiate and maintain comfort rounds  Outcome: Adequate for Discharge  Goal:  Maintain or return to baseline ADL function  Description: INTERVENTIONS:  -  Assess patient's ability to carry out ADLs; assess patient's baseline for ADL function and identify physical deficits which impact ability to perform ADLs (bathing, care of mouth/teeth, toileting, grooming, dressing, etc.)  - Assess/evaluate cause of self-care deficits   - Assess range of motion  - Assess patient's mobility; develop plan if impaired  - Assess patient's need for assistive devices and provide as appropriate  - Encourage maximum independence but intervene and supervise when necessary  - Involve family in performance of ADLs  - Assess for home care needs following discharge   - Consider OT consult to assist with ADL evaluation and planning for discharge  - Provide patient education as appropriate  Outcome: Adequate for Discharge  Goal: Maintains/Returns to pre admission functional level  Description: INTERVENTIONS:  - Perform AM-PAC 6 Click Basic Mobility/ Daily Activity assessment daily.  - Set and communicate daily mobility goal to care team and patient/family/caregiver.   - Out of bed for toileting  - Record patient progress and toleration of activity level   Outcome: Adequate for Discharge     Problem: Knowledge Deficit  Goal: Patient/family/caregiver demonstrates understanding of disease process, treatment plan, medications, and discharge instructions  Description: Complete learning assessment and assess knowledge base.  Interventions:  - Provide teaching at level of understanding  - Provide teaching via preferred learning methods  Outcome: Adequate for Discharge     Problem: DISCHARGE PLANNING  Goal: Discharge to home or other facility with appropriate resources  Description: INTERVENTIONS:  - Identify barriers to discharge w/patient and caregiver  - Arrange for needed discharge resources and transportation as appropriate  - Identify discharge learning needs (meds, wound care, etc.)  - Arrange for interpretive services to  assist at discharge as needed  - Refer to Case Management Department for coordinating discharge planning if the patient needs post-hospital services based on physician/advanced practitioner order or complex needs related to functional status, cognitive ability, or social support system  Outcome: Adequate for Discharge     Problem: POSTPARTUM  Goal: Experiences normal postpartum course  Description: INTERVENTIONS:  - Monitor maternal vital signs  - Assess uterine involution and lochia  Outcome: Adequate for Discharge  Goal: Appropriate maternal -  bonding  Description: INTERVENTIONS:  - Identify family support  - Assess for appropriate maternal/infant bonding   -Encourage maternal/infant bonding opportunities  - Referral to  or  as needed  Outcome: Adequate for Discharge  Goal: Establishment of infant feeding pattern  Description: INTERVENTIONS:  - Assess breast pumping  - Refer to lactation as needed  Outcome: Adequate for Discharge  Goal: Incision(s), wounds(s) or drain site(s) healing without S/S of infection  Description: INTERVENTIONS  - Assess and document dressing, incision, wound bed, drain sites and surrounding tissue  - Provide patient and family education  Outcome: Adequate for Discharge

## 2024-05-08 NOTE — ANESTHESIA POSTPROCEDURE EVALUATION
Post-Op Assessment Note    CV Status:  Stable    Pain management: adequate       Mental Status:  Awake   Hydration Status:  Euvolemic   PONV Controlled:  Controlled   Airway Patency:  Patent     Post Op Vitals Reviewed: Yes    No anethesia notable event occurred.    Staff: Anesthesiologist               BP      Temp      Pulse     Resp      SpO2

## 2024-05-09 ENCOUNTER — PATIENT MESSAGE (OUTPATIENT)
Dept: OBGYN CLINIC | Facility: CLINIC | Age: 34
End: 2024-05-09

## 2024-05-10 LAB — PLACENTA IN STORAGE: NORMAL

## 2024-05-17 ENCOUNTER — TELEPHONE (OUTPATIENT)
Age: 34
End: 2024-05-17

## 2024-05-17 NOTE — TELEPHONE ENCOUNTER
Patient had called to follow up on her [facility] paperwork that she had dropped off this week. Patient can be reached at [phone] or via my chart.  
 section

## 2024-05-24 ENCOUNTER — POSTPARTUM VISIT (OUTPATIENT)
Dept: OBGYN CLINIC | Facility: CLINIC | Age: 34
End: 2024-05-24
Payer: COMMERCIAL

## 2024-05-24 VITALS
BODY MASS INDEX: 33.99 KG/M2 | WEIGHT: 180 LBS | HEIGHT: 61 IN | DIASTOLIC BLOOD PRESSURE: 64 MMHG | SYSTOLIC BLOOD PRESSURE: 110 MMHG

## 2024-05-24 DIAGNOSIS — Z98.891 STATUS POST REPEAT LOW TRANSVERSE CESAREAN SECTION: ICD-10-CM

## 2024-05-24 PROBLEM — O09.219 PREVIOUS PRETERM DELIVERY, ANTEPARTUM: Status: RESOLVED | Noted: 2023-11-23 | Resolved: 2024-05-24

## 2024-05-24 PROBLEM — O09.522 MULTIGRAVIDA OF ADVANCED MATERNAL AGE IN SECOND TRIMESTER: Status: RESOLVED | Noted: 2023-11-21 | Resolved: 2024-05-24

## 2024-05-24 PROBLEM — O34.211 MATERNAL CARE DUE TO LOW TRANSVERSE UTERINE SCAR FROM PREVIOUS CESAREAN DELIVERY: Status: RESOLVED | Noted: 2023-11-21 | Resolved: 2024-05-24

## 2024-05-24 PROCEDURE — 99024 POSTOP FOLLOW-UP VISIT: CPT | Performed by: OBSTETRICS & GYNECOLOGY

## 2024-05-24 NOTE — PROGRESS NOTES
Assessment & Plan     Normal postpartum exam.     1. Contraception: tubal ligation  2. Annual exam due in September.   3. Increase activity as tolerated, may resume all normal activity.  4. Lactation consult needed: no; if yes, [facility] information discussed.         Subjective   Chief Complaint   Patient presents with    Postpartum Care     del 2024, RLTCS w/ BTL, baby Tonia, 6#13oz (was in NICU until 2024)       Tamika Phelan is a 34 y.o.  female who presents for a postpartum visit.     Delivery Method: , Low Transverse   Delivery Date and Time: 5/3/2024 1:56 AM  Delivery Attending: Jessica Tsang  Gestational Age at delivery: 36w1d   Route of Delivery: , Low Transverse  Reason for  delivery: Prior  1    Admitting Diagnoses:   Patient Active Problem List   Diagnosis    Eczema, dyshidrotic    Acne    Irregular menstrual cycle    Overweight    PCOS (polycystic ovarian syndrome)    Vitamin D deficiency    Excess skin of abdominal wall    Postsurgical malabsorption    Iron deficiency anemia, unspecified    Previous gastric bypass affecting pregnancy in third trimester, antepartum    Umbilical hernia    Multigravida of advanced maternal age in second trimester    Maternal care due to low transverse uterine scar from previous  delivery    Previous  delivery, antepartum    Epigastric pain    Status post repeat low transverse  section    Elevated blood pressure reading    Status post bilateral salpingectomy       Gestational Diabetes: no  Pregnancy Complications: none    Anesthesia: Spinal,     Delivery: , Low Transverse at 5/3/2024 1:56 AM        Baby's Name: Tonia  Baby's Weight: 3100 g (6 lb 13.4 oz); 109.35    Apgar scores: 7  and 8  at 1 and 5 minutes, respectively  Breast or formula: Breastfeeding    Bleeding no bleeding. Bowel function: normal. Bladder function: normal. The patient is not having any  "pain.     Patient has not been sexually active. Desired contraception method is tubal ligation.     Postpartum depression screening: negative. EPDS : 0. She denies depression/anxiety/trouble sleeping.     Last Pap : 2023 ; no abnormalities      The following portions of the patient's history were reviewed and updated as appropriate: allergies, current medications, past family history, past medical history, past social history, past surgical history, and problem list.      Current Outpatient Medications:     Prenatal Vit-Fe Fumarate-FA (PRENATAL PO), Take by mouth, Disp: , Rfl:     No Known Allergies    Review of Systems  Review of Systems   Constitutional: Negative.    HENT: Negative.     Respiratory: Negative.     Cardiovascular: Negative.    Genitourinary: Negative.    Neurological: Negative.          Objective      /64 (BP Location: Right arm, Patient Position: Sitting, Cuff Size: Standard)   Ht 5' 1\" (1.549 m)   Wt 81.6 kg (180 lb)   LMP 08/18/2023   Breastfeeding Yes   BMI 34.01 kg/m²     Physical Exam  Constitutional:       Appearance: Normal appearance.   HENT:      Head: Normocephalic and atraumatic.   Eyes:      Extraocular Movements: Extraocular movements intact.      Conjunctiva/sclera: Conjunctivae normal.      Pupils: Pupils are equal, round, and reactive to light.   Pulmonary:      Effort: Pulmonary effort is normal.   Abdominal:      General: Abdomen is flat.      Palpations: Abdomen is soft.   Neurological:      Mental Status: She is alert.   Vitals and nursing note reviewed.         Incision: clean, dry, and intact, healing well      "

## 2025-01-31 ENCOUNTER — TELEPHONE (OUTPATIENT)
Dept: BARIATRICS | Facility: CLINIC | Age: 35
End: 2025-01-31

## 2025-01-31 NOTE — TELEPHONE ENCOUNTER
----- Message from Christina WOODARD sent at 1/31/2025  8:11 AM EST -----  Regarding: 3 year f/u appointment  Please contact patient to schedule a 3 year follow up appointment and document the results of the call in EPIC.  Thank You

## 2025-02-24 NOTE — TELEPHONE ENCOUNTER
----- Message from [id number]  [facility] sent at 3/22/2023 10:45 AM EDT -----  Regarding: Lab work  Contact: [phone]  Good Morning,  Would you be able to put an order for lab work so I can get the hemoglobin checked again next week  They want me to be at 11 5 for surgery   4 = No assist / stand by assistance

## 2025-04-02 ENCOUNTER — RA CDI HCC (OUTPATIENT)
Dept: OTHER | Facility: HOSPITAL | Age: 35
End: 2025-04-02

## 2025-04-09 ENCOUNTER — OFFICE VISIT (OUTPATIENT)
Dept: FAMILY MEDICINE CLINIC | Facility: CLINIC | Age: 35
End: 2025-04-09
Payer: COMMERCIAL

## 2025-04-09 VITALS
WEIGHT: 175 LBS | OXYGEN SATURATION: 98 % | HEIGHT: 61 IN | RESPIRATION RATE: 16 BRPM | BODY MASS INDEX: 33.04 KG/M2 | HEART RATE: 77 BPM | DIASTOLIC BLOOD PRESSURE: 80 MMHG | SYSTOLIC BLOOD PRESSURE: 118 MMHG | TEMPERATURE: 98.3 F

## 2025-04-09 DIAGNOSIS — E53.8 VITAMIN B12 DEFICIENCY: ICD-10-CM

## 2025-04-09 DIAGNOSIS — F41.9 ANXIETY: ICD-10-CM

## 2025-04-09 DIAGNOSIS — L98.9 SKIN LESION: ICD-10-CM

## 2025-04-09 DIAGNOSIS — E55.9 VITAMIN D INSUFFICIENCY: ICD-10-CM

## 2025-04-09 DIAGNOSIS — Z00.00 ANNUAL PHYSICAL EXAM: ICD-10-CM

## 2025-04-09 DIAGNOSIS — D50.8 OTHER IRON DEFICIENCY ANEMIA: ICD-10-CM

## 2025-04-09 DIAGNOSIS — F90.9 ATTENTION DEFICIT HYPERACTIVITY DISORDER (ADHD), UNSPECIFIED ADHD TYPE: Primary | ICD-10-CM

## 2025-04-09 DIAGNOSIS — R60.0 BILATERAL LEG EDEMA: ICD-10-CM

## 2025-04-09 PROCEDURE — 99214 OFFICE O/P EST MOD 30 MIN: CPT | Performed by: FAMILY MEDICINE

## 2025-04-09 PROCEDURE — 99395 PREV VISIT EST AGE 18-39: CPT | Performed by: FAMILY MEDICINE

## 2025-04-09 RX ORDER — DEXTROAMPHETAMINE SACCHARATE, AMPHETAMINE ASPARTATE, DEXTROAMPHETAMINE SULFATE AND AMPHETAMINE SULFATE 2.5; 2.5; 2.5; 2.5 MG/1; MG/1; MG/1; MG/1
10 TABLET ORAL
Qty: 60 TABLET | Refills: 0 | Status: SHIPPED | OUTPATIENT
Start: 2025-04-09

## 2025-04-09 RX ORDER — FUROSEMIDE 20 MG/1
20 TABLET ORAL DAILY
Qty: 30 TABLET | Refills: 1 | Status: SHIPPED | OUTPATIENT
Start: 2025-04-09

## 2025-04-09 NOTE — PROGRESS NOTES
Name: Tamika Phelan      : 1990      MRN: [id number]  Encounter Provider: Claire Daly MD  Encounter Date: 2025   Encounter department: [facility]  :  Assessment & Plan  Attention deficit hyperactivity disorder (ADHD), unspecified ADHD type  Pt reports she thinks she has had this her entire life, but never been on medication for it.   Placed order to begin Aderrall XR 5 mg daily.   Discussed proper use and potential side effects.   Follow-up in one month.   Orders:  •  amphetamine-dextroamphetamine (ADDERALL, 10MG,) 10 mg tablet; Take 1 tablet (10 mg total) by mouth 2 (two) times a day Max Daily Amount: 20 mg    Anxiety  She would like to begin medication.   Placed order for Lexapro 10 mg daily.   Discussed proper use and potential side effects.   Follow-up in one month.        Skin lesion  Noticed several growths on her face over the past year.   They are not painful.   Placed referral to dermatology.   Orders:  •  Ambulatory Referral to Dermatology; Future    Bilateral leg edema  Present for the past few years despite losing about 100 lbs.   Will continue to monitor.   Orders:  •  furosemide (LASIX) 20 mg tablet; Take 1 tablet (20 mg total) by mouth daily    Vitamin D insufficiency    Orders:  •  Vitamin D 25 hydroxy; Future    Other iron deficiency anemia  :[id number]      Orders:  •  CBC and differential; Future  •  Iron Panel (Includes Ferritin, Iron Sat%, Iron, and TIBC); Future    Vitamin B12 deficiency    Orders:  •  Vitamin B12; Future    Annual physical exam  Discussed healthy diet, regular exercise, safety, and immunizations.   Blood work including CBC, CMP, Thyroid, Lipid Panel, A1c, and iron panel ordered.   Orders:  •  Comprehensive metabolic panel; Future  •  Lipid Panel with Direct LDL reflex; Future  •  TSH, 3rd generation with Free T4 reflex; Future           History of Present Illness   Pt is a 33 y/o female w PMH of PCOS, iron deficiency anemia,  "and vitamin D deficiency here today for follow-up. She reports she is doing well overall. She would like to discuss treatment options for ADHD and anxiety as well as for growths she has noticed on her face which have been present for the past year.She reports taking all medications as directed and denies any side effects. Blood pressure in the office today is (118/80). No recent labs to review.         Review of Systems   Constitutional:  Negative for chills and fever.   HENT:  Negative for ear pain and sore throat.    Eyes:  Negative for pain and visual disturbance.   Respiratory:  Negative for cough and shortness of breath.    Cardiovascular:  Negative for chest pain and palpitations.   Gastrointestinal:  Negative for abdominal pain and vomiting.   Genitourinary:  Negative for dysuria and hematuria.   Musculoskeletal:  Negative for arthralgias and back pain.   Skin:  Negative for color change and rash.   Neurological:  Negative for seizures and syncope.   All other systems reviewed and are negative.      Objective   /80 (BP Location: Left arm, Patient Position: Sitting, Cuff Size: Adult)   Pulse 77   Temp 98.3 °F (36.8 °C) (Tympanic)   Resp 16   Ht 5' 1\" (1.549 m)   Wt 79.4 kg (175 lb)   SpO2 98%   BMI 33.07 kg/m²      Physical Exam  Vitals and nursing note reviewed.   Constitutional:       General: She is not in acute distress.     Appearance: She is well-developed.   HENT:      Head: Normocephalic and atraumatic.   Eyes:      Conjunctiva/sclera: Conjunctivae normal.   Cardiovascular:      Rate and Rhythm: Normal rate and regular rhythm.      Heart sounds: No murmur heard.  Pulmonary:      Effort: Pulmonary effort is normal. No respiratory distress.      Breath sounds: Normal breath sounds.   Abdominal:      Palpations: Abdomen is soft.      Tenderness: There is no abdominal tenderness.   Musculoskeletal:         General: No swelling.      Cervical back: Neck supple.   Skin:     General: Skin is warm " and dry.      Capillary Refill: Capillary refill takes less than 2 seconds.   Neurological:      Mental Status: She is alert.   Psychiatric:         Mood and Affect: Mood normal.

## 2025-04-10 ENCOUNTER — APPOINTMENT (OUTPATIENT)
Dept: LAB | Facility: CLINIC | Age: 35
End: 2025-04-10
Payer: COMMERCIAL

## 2025-04-10 DIAGNOSIS — E53.8 VITAMIN B12 DEFICIENCY: ICD-10-CM

## 2025-04-10 DIAGNOSIS — E55.9 VITAMIN D INSUFFICIENCY: ICD-10-CM

## 2025-04-10 DIAGNOSIS — Z00.00 ANNUAL PHYSICAL EXAM: ICD-10-CM

## 2025-04-10 DIAGNOSIS — D50.8 OTHER IRON DEFICIENCY ANEMIA: ICD-10-CM

## 2025-04-10 PROBLEM — R60.0 BILATERAL LEG EDEMA: Status: ACTIVE | Noted: 2025-04-10

## 2025-04-10 PROBLEM — F41.9 ANXIETY: Status: ACTIVE | Noted: 2025-04-10

## 2025-04-10 PROBLEM — F90.9 ATTENTION DEFICIT HYPERACTIVITY DISORDER (ADHD): Status: ACTIVE | Noted: 2025-04-10

## 2025-04-10 LAB
25(OH)D3 SERPL-MCNC: 22 NG/ML (ref 30–100)
ALBUMIN SERPL BCG-MCNC: 4.4 G/DL (ref 3.5–5)
ALP SERPL-CCNC: 62 U/L (ref 34–104)
ALT SERPL W P-5'-P-CCNC: 12 U/L (ref 7–52)
ANION GAP SERPL CALCULATED.3IONS-SCNC: 6 MMOL/L (ref 4–13)
AST SERPL W P-5'-P-CCNC: 14 U/L (ref 13–39)
BASOPHILS # BLD AUTO: 0.02 THOUSANDS/ÂΜL (ref 0–0.1)
BASOPHILS NFR BLD AUTO: 1 % (ref 0–1)
BILIRUB SERPL-MCNC: 0.43 MG/DL (ref 0.2–1)
BUN SERPL-MCNC: 11 MG/DL (ref 5–25)
CALCIUM SERPL-MCNC: 8.6 MG/DL (ref 8.4–10.2)
CHLORIDE SERPL-SCNC: 105 MMOL/L (ref 96–108)
CHOLEST SERPL-MCNC: 148 MG/DL (ref ?–200)
CO2 SERPL-SCNC: 28 MMOL/L (ref 21–32)
CREAT SERPL-MCNC: 0.72 MG/DL (ref 0.6–1.3)
EOSINOPHIL # BLD AUTO: 0.08 THOUSAND/ÂΜL (ref 0–0.61)
EOSINOPHIL NFR BLD AUTO: 2 % (ref 0–6)
ERYTHROCYTE [DISTWIDTH] IN BLOOD BY AUTOMATED COUNT: 13 % (ref 11.6–15.1)
FERRITIN SERPL-MCNC: 4 NG/ML (ref 30–307)
GFR SERPL CREATININE-BSD FRML MDRD: 108 ML/MIN/1.73SQ M
GLUCOSE P FAST SERPL-MCNC: 83 MG/DL (ref 65–99)
HCT VFR BLD AUTO: 36.9 % (ref 34.8–46.1)
HDLC SERPL-MCNC: 70 MG/DL
HGB BLD-MCNC: 11 G/DL (ref 11.5–15.4)
IMM GRANULOCYTES # BLD AUTO: 0.01 THOUSAND/UL (ref 0–0.2)
IMM GRANULOCYTES NFR BLD AUTO: 0 % (ref 0–2)
IRON SATN MFR SERPL: 9 % (ref 15–50)
IRON SERPL-MCNC: 40 UG/DL (ref 50–212)
LDLC SERPL CALC-MCNC: 64 MG/DL (ref 0–100)
LYMPHOCYTES # BLD AUTO: 1.2 THOUSANDS/ÂΜL (ref 0.6–4.47)
LYMPHOCYTES NFR BLD AUTO: 28 % (ref 14–44)
MCH RBC QN AUTO: 24.4 PG (ref 26.8–34.3)
MCHC RBC AUTO-ENTMCNC: 29.8 G/DL (ref 31.4–37.4)
MCV RBC AUTO: 82 FL (ref 82–98)
MONOCYTES # BLD AUTO: 0.31 THOUSAND/ÂΜL (ref 0.17–1.22)
MONOCYTES NFR BLD AUTO: 7 % (ref 4–12)
NEUTROPHILS # BLD AUTO: 2.67 THOUSANDS/ÂΜL (ref 1.85–7.62)
NEUTS SEG NFR BLD AUTO: 62 % (ref 43–75)
NRBC BLD AUTO-RTO: 0 /100 WBCS
PLATELET # BLD AUTO: 282 THOUSANDS/UL (ref 149–390)
PMV BLD AUTO: 12.1 FL (ref 8.9–12.7)
POTASSIUM SERPL-SCNC: 3.7 MMOL/L (ref 3.5–5.3)
PROT SERPL-MCNC: 6.6 G/DL (ref 6.4–8.4)
RBC # BLD AUTO: 4.5 MILLION/UL (ref 3.81–5.12)
SODIUM SERPL-SCNC: 139 MMOL/L (ref 135–147)
TIBC SERPL-MCNC: 428.4 UG/DL (ref 250–450)
TRANSFERRIN SERPL-MCNC: 306 MG/DL (ref 203–362)
TRIGL SERPL-MCNC: 71 MG/DL (ref ?–150)
TSH SERPL DL<=0.05 MIU/L-ACNC: 0.9 UIU/ML (ref 0.45–4.5)
UIBC SERPL-MCNC: 388 UG/DL (ref 155–355)
VIT B12 SERPL-MCNC: 437 PG/ML (ref 180–914)
WBC # BLD AUTO: 4.29 THOUSAND/UL (ref 4.31–10.16)

## 2025-04-10 PROCEDURE — 36415 COLL VENOUS BLD VENIPUNCTURE: CPT

## 2025-04-10 PROCEDURE — 82728 ASSAY OF FERRITIN: CPT

## 2025-04-10 PROCEDURE — 82306 VITAMIN D 25 HYDROXY: CPT

## 2025-04-10 PROCEDURE — 80061 LIPID PANEL: CPT

## 2025-04-10 PROCEDURE — 82607 VITAMIN B-12: CPT

## 2025-04-10 PROCEDURE — 83550 IRON BINDING TEST: CPT

## 2025-04-10 PROCEDURE — 85025 COMPLETE CBC W/AUTO DIFF WBC: CPT

## 2025-04-10 PROCEDURE — 83540 ASSAY OF IRON: CPT

## 2025-04-10 PROCEDURE — 84443 ASSAY THYROID STIM HORMONE: CPT

## 2025-04-10 PROCEDURE — 80053 COMPREHEN METABOLIC PANEL: CPT

## 2025-04-10 NOTE — ASSESSMENT & PLAN NOTE
Pt reports she thinks she has had this her entire life, but never been on medication for it.   Placed order to begin Aderrall XR 5 mg daily.   Discussed proper use and potential side effects.   Follow-up in one month.   Orders:  •  amphetamine-dextroamphetamine (ADDERALL, 10MG,) 10 mg tablet; Take 1 tablet (10 mg total) by mouth 2 (two) times a day Max Daily Amount: 20 mg

## 2025-04-10 NOTE — ASSESSMENT & PLAN NOTE
:[id number]      Orders:  •  CBC and differential; Future  •  Iron Panel (Includes Ferritin, Iron Sat%, Iron, and TIBC); Future

## 2025-04-10 NOTE — ASSESSMENT & PLAN NOTE
She would like to begin medication.   Placed order for Lexapro 10 mg daily.   Discussed proper use and potential side effects.   Follow-up in one month.

## 2025-04-18 ENCOUNTER — RESULTS FOLLOW-UP (OUTPATIENT)
Dept: FAMILY MEDICINE CLINIC | Facility: CLINIC | Age: 35
End: 2025-04-18

## 2025-05-07 ENCOUNTER — OFFICE VISIT (OUTPATIENT)
Dept: FAMILY MEDICINE CLINIC | Facility: CLINIC | Age: 35
End: 2025-05-07
Payer: COMMERCIAL

## 2025-05-07 VITALS
OXYGEN SATURATION: 98 % | WEIGHT: 170.4 LBS | DIASTOLIC BLOOD PRESSURE: 70 MMHG | RESPIRATION RATE: 16 BRPM | SYSTOLIC BLOOD PRESSURE: 124 MMHG | HEART RATE: 100 BPM | TEMPERATURE: 98.3 F | HEIGHT: 61 IN | BODY MASS INDEX: 32.17 KG/M2

## 2025-05-07 DIAGNOSIS — E55.9 VITAMIN D INSUFFICIENCY: ICD-10-CM

## 2025-05-07 DIAGNOSIS — F90.9 ATTENTION DEFICIT HYPERACTIVITY DISORDER (ADHD), UNSPECIFIED ADHD TYPE: Primary | ICD-10-CM

## 2025-05-07 DIAGNOSIS — E53.8 VITAMIN B12 DEFICIENCY: ICD-10-CM

## 2025-05-07 DIAGNOSIS — F41.9 ANXIETY: ICD-10-CM

## 2025-05-07 DIAGNOSIS — D50.8 OTHER IRON DEFICIENCY ANEMIA: ICD-10-CM

## 2025-05-07 DIAGNOSIS — R60.0 BILATERAL LEG EDEMA: ICD-10-CM

## 2025-05-07 PROCEDURE — 99214 OFFICE O/P EST MOD 30 MIN: CPT | Performed by: FAMILY MEDICINE

## 2025-05-07 RX ORDER — DEXTROAMPHETAMINE SACCHARATE, AMPHETAMINE ASPARTATE, DEXTROAMPHETAMINE SULFATE AND AMPHETAMINE SULFATE 5; 5; 5; 5 MG/1; MG/1; MG/1; MG/1
20 TABLET ORAL
Qty: 60 TABLET | Refills: 0 | Status: SHIPPED | OUTPATIENT
Start: 2025-05-07

## 2025-05-07 RX ORDER — FERROUS SULFATE 325(65) MG
325 TABLET ORAL
Qty: 90 TABLET | Refills: 1 | Status: SHIPPED | OUTPATIENT
Start: 2025-05-07

## 2025-05-07 NOTE — PROGRESS NOTES
Name: Tamika Phelan      : 1990      MRN: [id number]  Encounter Provider: Claire Daly MD  Encounter Date: 2025   Encounter department: [facility]  :  Assessment & Plan  Attention deficit hyperactivity disorder (ADHD), unspecified ADHD type  - Improved. Continue to monitor.  - Increased Adderall from 10 mg BID to 20 mg QD-BID.   - Plan 3 month f/u.  Anxiety  - Associated with ADHD diagnosis.   - Improved with Adderall regimen.  - Continue to monitor.       Bilateral leg edema  - Well-controlled. Pt took 3-4 days of Lasix 20 mg and noticed improvement in edema.   - Continue lasix 20 mg PO daily PRN for swelling.   - Continue to monitor.       Other iron deficiency anemia    - Low iron and ferritin levels on recent labs. Hgb 11.0.   - Pt used to follow with hematology for iron infusions. Has not been following.   - Will try oral supplementation with ferrous sulfate 325 mg tab PO daily. Pt advised to take vitamin C concurrently to increase absorption.  - Plan 3 month f/u with repeat labs.    - Plan referral to hematology if oral supplementation is insufficient.    Orders:  •  ferrous sulfate (FeroSul) 325 (65 Fe) mg tablet; Take 1 tablet (325 mg total) by mouth daily with breakfast  •  CBC and differential; Future  •  Iron Panel (Includes Ferritin, Iron Sat%, Iron, and TIBC); Future    Vitamin B12 deficiency  - Well-controlled. Pt advised to continue daily multivitamin.   - Plan 3 month f/u with repeat labs.       Vitamin D insufficiency  - Vit D levels low on recent labs.   - Initiate Vit D tablet supplementation. Advised pt to take 2,000 units daily on weekdays and 4,000-5,0000 units daily on weekends.    - Pt not good candidate for weekly 50,000 unit capsule supplementation due to h/o gastric bypass surgery.  - Plan 3 month f/u with repeat labs.  Orders:  •  Vitamin D 25 hydroxy; Future           History of Present Illness   Najma Mcduffie has a PMH of  "PCOS, ADHD, Vit D and Vit B12 deficiencies, iron deficiency anemia and anxiety presenting for 1 month follow up. She was recently started on Adderall 10 mg BID for ADHD/anxiety and lasix 20 mg daily prn for BL lower leg edema. She has been tolerating all medications well and denies any adverse side effects. She has lost 5 lbs since her last visit. She states that she would like to discuss increasing dosage of Adderall to further relieve symptoms of ADHD and anxiety. Blood pressure in the office today was 124/70. Recent labs reveal decreased iron and ferritin levels, mild anemia and Vitamin D insufficiency. No additional complaints.         Review of Systems   Constitutional:  Negative for chills and fever.   Respiratory:  Negative for cough and shortness of breath.    Cardiovascular:  Negative for chest pain, palpitations and leg swelling.   Gastrointestinal:  Negative for constipation, diarrhea, nausea and vomiting.   Skin:  Negative for rash.   Neurological:  Negative for dizziness, light-headedness and headaches.   All other systems reviewed and are negative.      Objective   /70 (BP Location: Left arm, Patient Position: Sitting, Cuff Size: Standard)   Pulse 100   Temp 98.3 °F (36.8 °C) (Tympanic)   Resp 16   Ht 5' 1\" (1.549 m)   Wt 77.3 kg (170 lb 6.4 oz)   SpO2 98%   Breastfeeding No   BMI 32.20 kg/m²      Physical Exam  Vitals reviewed.   Constitutional:       General: She is not in acute distress.     Appearance: She is well-developed.   HENT:      Head: Normocephalic and atraumatic.      Right Ear: Tympanic membrane, ear canal and external ear normal.      Left Ear: Tympanic membrane, ear canal and external ear normal.      Nose: Nose normal.      Mouth/Throat:      Mouth: Mucous membranes are moist.      Pharynx: No oropharyngeal exudate or posterior oropharyngeal erythema.   Eyes:      Extraocular Movements: Extraocular movements intact.      Conjunctiva/sclera: Conjunctivae normal. "   Cardiovascular:      Rate and Rhythm: Normal rate and regular rhythm.      Heart sounds: No murmur heard.  Pulmonary:      Effort: Pulmonary effort is normal. No respiratory distress.      Breath sounds: Normal breath sounds.   Musculoskeletal:      Cervical back: Normal range of motion.      Right lower leg: No edema.      Left lower leg: No edema.   Skin:     General: Skin is warm and dry.   Neurological:      Mental Status: She is alert.   Psychiatric:         Mood and Affect: Mood normal.

## 2025-05-07 NOTE — ASSESSMENT & PLAN NOTE
- Low iron and ferritin levels on recent labs. Hgb 11.0.   - Pt used to follow with hematology for iron infusions. Has not been following.   - Will try oral supplementation with ferrous sulfate 325 mg tab PO daily. Pt advised to take vitamin C concurrently to increase absorption.  - Plan 3 month f/u with repeat labs.    - Plan referral to hematology if oral supplementation is insufficient.    Orders:  •  ferrous sulfate (FeroSul) 325 (65 Fe) mg tablet; Take 1 tablet (325 mg total) by mouth daily with breakfast  •  CBC and differential; Future  •  Iron Panel (Includes Ferritin, Iron Sat%, Iron, and TIBC); Future

## 2025-05-07 NOTE — ASSESSMENT & PLAN NOTE
- Well-controlled. Pt took 3-4 days of Lasix 20 mg and noticed improvement in edema.   - Continue lasix 20 mg PO daily PRN for swelling.   - Continue to monitor.

## 2025-05-07 NOTE — ASSESSMENT & PLAN NOTE
- Improved. Continue to monitor.  - Increased Adderall from 10 mg BID to 20 mg QD-BID.   - Plan 3 month f/u.

## 2025-06-11 DIAGNOSIS — F90.9 ATTENTION DEFICIT HYPERACTIVITY DISORDER (ADHD), UNSPECIFIED ADHD TYPE: ICD-10-CM

## 2025-06-12 RX ORDER — DEXTROAMPHETAMINE SACCHARATE, AMPHETAMINE ASPARTATE, DEXTROAMPHETAMINE SULFATE AND AMPHETAMINE SULFATE 5; 5; 5; 5 MG/1; MG/1; MG/1; MG/1
20 TABLET ORAL
Qty: 60 TABLET | Refills: 0 | Status: SHIPPED | OUTPATIENT
Start: 2025-06-12

## 2025-06-12 NOTE — TELEPHONE ENCOUNTER
Refills have been requested for the following medications:         amphetamine-dextroamphetamine (ADDERALL, 20MG,) 20 mg tablet [Arsen Daly MD]     Preferred pharmacy: [id number] - [place], [place] - [address]    Last seen 5/7/25  Has appt 8/13/25     1 RODRICK BAKER 1990 F [id number] [place]   2 RODRICK VIAL SEGUNDO 1990 F [id number] [place]      Search Criteria  Name Date of Birth Date Range  Rodrick Baker 1990 06- To 06-  Requester Name Requested Date  ARSEN DALY 06- 12:10:36 ([facility])  Summary  Prescriptions  2  Prescribers  1  Pharmacies  1  Drug Classes  Benzodiazepines  0  Stimulants  2  Opioids  0  Muscle Relaxants  0  Opioid Dosage  Total MME for Active Prescriptions  0    Average MME  0.00         Prescriptions  Notifications    Prescribers  Pharmacies  MME Graph    Show All     [place]   Drug Categories:      Stimulants     Show  10  entries  Search:  Patient Id Prescription # Sold Filled Written Drug Label Qty Days Strength MME* Prescriber Pharmacy Payment REFILL #/Auth State Detail  1 [id number] 05/07/2025 05/07/2025 05/07/2025 Amphetamine Salt Combo (Tablet) 60.0 30 20 MG NA WALLY) [facility] [place] [facility] 0 / 0 [place]   2 [id number] 04/09/2025 04/09/2025 04/09/2025 Amphetamine Salt Combo (Tablet) 60.0 30 10 MG NA WALLY) [facility]

## 2025-06-26 ENCOUNTER — TELEMEDICINE (OUTPATIENT)
Dept: FAMILY MEDICINE CLINIC | Facility: CLINIC | Age: 35
End: 2025-06-26
Payer: COMMERCIAL

## 2025-06-26 VITALS — WEIGHT: 165 LBS | BODY MASS INDEX: 31.15 KG/M2 | HEIGHT: 61 IN

## 2025-06-26 DIAGNOSIS — T14.8XXA BLISTER: Primary | ICD-10-CM

## 2025-06-26 PROCEDURE — 99213 OFFICE O/P EST LOW 20 MIN: CPT

## 2025-06-26 RX ORDER — MUPIROCIN 2 %
OINTMENT (GRAM) TOPICAL 3 TIMES DAILY
Qty: 22 G | Refills: 0 | Status: SHIPPED | OUTPATIENT
Start: 2025-06-26

## 2025-06-26 NOTE — PROGRESS NOTES
"Virtual Regular Visit  Name: Tamika Phelan      : 1990      MRN: [id number]  Encounter Provider: Yazmin Barnes PA-C  Encounter Date: 2025   Encounter department: [facility]  :  Assessment & Plan  Blister  - Blister/erythema above upper lip with yellow crusting present per patient.  - Cold sore medication did not improve symptoms.  - Prescription sent for mupirocin ointment. Discussed potential side effects.  - Follow up if not improving.   Orders:    mupirocin (BACTROBAN) 2 % ointment; Apply topically 3 (three) times a day                   History of Present Illness     Patient presenting for virtual visit due to blister/lesion above lip. She notes that it appeared on Tuesday and she thinks she had a bug bite of some sort. She states that she woke up the next day and there were 4 little blisters. She was using cold sore treatment with no relief. She notes that there was yellow crusting/scabbing present and she feels it could be impetigo. She denies any other symptoms or concerns.       Review of Systems   Constitutional:  Negative for chills, fatigue and fever.   HENT:  Negative for congestion, ear pain and sore throat.    Eyes:  Negative for pain.   Respiratory:  Negative for cough and shortness of breath.    Cardiovascular:  Negative for chest pain and palpitations.   Gastrointestinal:  Negative for abdominal pain, nausea and vomiting.   Genitourinary:  Negative for dysuria.   Musculoskeletal:  Negative for arthralgias and back pain.   Skin:  Negative for rash.        + lesion on upper lip   Neurological:  Negative for dizziness and headaches.       Objective   Ht 5' 1\" (1.549 m)   Wt 74.8 kg (165 lb)   BMI 31.18 kg/m²     Physical Exam  Constitutional:       Appearance: Normal appearance.   HENT:      Head: Normocephalic.      Mouth/Throat:      Comments: Red erythema above upper lip  Difficult to visualize any yellow crusting appearance that patient " PAM JIMENEZ:[id number],   58yMale followed for:  No Known Allergies    PAST MEDICAL & SURGICAL HISTORY:  CVA (cerebral vascular accident)  S/P percutaneous endoscopic gastrostomy (PEG) tube placement    FAMILY HISTORY:  No pertinent family history in first degree relatives    MEDICATIONS  (STANDING):  aspirin  chewable 81 milliGRAM(s) Oral daily  atorvastatin 80 milliGRAM(s) Oral at bedtime  chlorhexidine 2% Cloths 1 Application(s) Topical daily  clopidogrel Tablet 75 milliGRAM(s) Oral daily  lactated ringers. 1000 milliLiter(s) (100 mL/Hr) IV Continuous <Continuous>  senna Syrup 5 milliLiter(s) Oral at bedtime  valproic  acid Syrup 125 milliGRAM(s) Oral daily    MEDICATIONS  (PRN):  polyethylene glycol 3350 17 Gram(s) Oral daily PRN Constipation  valproic  acid Syrup 125 milliGRAM(s) Oral every 6 hours PRN agitation      Vital Signs Last 24 Hrs  T(C): 36.6 (15 Mar 2020 23:15), Max: 36.7 (15 Mar 2020 16:35)  T(F): 97.8 (15 Mar 2020 23:15), Max: 98.1 (15 Mar 2020 16:35)  HR: 50 (15 Mar 2020 23:15) (50 - 55)  BP: 110/67 (15 Mar 2020 23:15) (110/67 - 116/68)  BP(mean): --  RR: 18 (15 Mar 2020 23:15) (18 - 18)  SpO2: 94% (15 Mar 2020 23:15) (94% - 97%)  nc/at  s1s2  cta  soft, nt, nd no guarding or rebound  no c/c/e notes was present    Neurological:      Mental Status: She is alert.     Psychiatric:         Mood and Affect: Mood normal.         Administrative Statements   Encounter provider Yazmin Barnes PA-C    The Patient is located at Home and in the following state in which I hold an active license [place].    The patient was identified by name and date of birth. Tamika WRAY DavidFroy was informed that this is a telemedicine visit and that the visit is being conducted through the Epic Embedded platform. She agrees to proceed..  My office door was closed. No one else was in the room.  She acknowledged consent and understanding of privacy and security of the video platform. The patient has agreed to participate and understands they can discontinue the visit at any time.

## 2025-07-09 NOTE — PATIENT COMMUNICATION
Placed call to the patient to review her Post partum assessment~  POSTPARTUM PHONE CALL ASSESSMENT    Date of Delivery: 2024  Delivering Provider:   Mode:  with BTL  Delivery Notes/Complications: baby went NICU   Do you still have bleeding/pain? If so, how much/how severe? Minimal bleeding, some pain on her Right side   Regular BMs/Urination? yes  Breastfeeding/Formula/Both? Breast / pumping  How are you doing emotionally? Okay, sometimes gets upset since Tonia is still in the hospital   EPDS Score: 6  Do you have any other questions or concerns for us or your provider? none  Have you scheduled the pediatrician appointment with pediatrician? Not yet- is waiting until baby gets released from the hospital (supposed to be 2024)  Do you have a postpartum visit scheduled? yes   Date scheduled: 2024 Provider:     Patient is agreeable to contact the office with any questions or concerns        Provided sling to pt and placed

## 2025-07-21 DIAGNOSIS — F90.9 ATTENTION DEFICIT HYPERACTIVITY DISORDER (ADHD), UNSPECIFIED ADHD TYPE: ICD-10-CM

## 2025-07-22 RX ORDER — DEXTROAMPHETAMINE SACCHARATE, AMPHETAMINE ASPARTATE, DEXTROAMPHETAMINE SULFATE AND AMPHETAMINE SULFATE 5; 5; 5; 5 MG/1; MG/1; MG/1; MG/1
20 TABLET ORAL
Qty: 60 TABLET | Refills: 0 | Status: SHIPPED | OUTPATIENT
Start: 2025-07-22

## 2025-07-22 NOTE — TELEPHONE ENCOUNTER
Refills have been requested for the following medications:         amphetamine-dextroamphetamine (ADDERALL, 20MG,) 20 mg tablet [Arsen Daly MD]     Preferred pharmacy: [facility] #[id number] - [place], [place] - [address]    Last seen 5/7/25  Has appt 8/13/25     1 RODRICK SAMUEL 1990 F [id number] [place]   2 RODRICK VIAL SEGUNDO 1990 F [id number] [place]      Search Criteria  Name Date of Birth Date Range  Rodrick Vial 1990 07- To 07-  Requester Name Requested Date  ARSEN DALY 07- 11:51:50 ([facility])  Summary  Prescriptions  3  Prescribers  1  Pharmacies  1  Drug Classes  Benzodiazepines  0  Stimulants  3  Opioids  0  Muscle Relaxants  0  Opioid Dosage  Total MME for Active Prescriptions  0    Average MME  0.00         Prescriptions  Notifications    Prescribers  Pharmacies  MME Graph    Show All     [place]   Drug Categories:      Stimulants     Show  10  entries  Search:  Patient Id Prescription # Sold Filled Written Drug Label Qty Days Strength MME* Prescriber Pharmacy Payment REFILL #/Auth State Detail  1 [id number] 06/20/2025 06/12/2025 06/12/2025 Amphetamine Salt Combo (Tablet) 60.0 30 20 MG NA WALLY) [facility]. [place] 0 / 0 [place]   1 [id number] 05/07/2025 05/07/2025 05/07/2025 Amphetamine Salt Combo (Tablet) 60.0 30 20 MG NA WALLY) [facility] [employer] [place] / 0 [place]   2 [id number] 04/09/2025 04/09/2025 04/09/2025 Amphetamine Salt Combo (Tablet) 60.0 30 10 MG NA WALLY) [facility] [employer] [employer] [place] / 0 [place]

## 2025-08-05 ENCOUNTER — APPOINTMENT (OUTPATIENT)
Dept: LAB | Facility: CLINIC | Age: 35
End: 2025-08-05
Payer: COMMERCIAL

## 2025-08-05 DIAGNOSIS — D50.9 IRON DEFICIENCY ANEMIA, UNSPECIFIED IRON DEFICIENCY ANEMIA TYPE: Primary | ICD-10-CM

## 2025-08-05 DIAGNOSIS — D50.9 IRON DEFICIENCY ANEMIA, UNSPECIFIED IRON DEFICIENCY ANEMIA TYPE: ICD-10-CM

## 2025-08-05 DIAGNOSIS — E55.9 VITAMIN D INSUFFICIENCY: ICD-10-CM

## 2025-08-05 DIAGNOSIS — D50.8 OTHER IRON DEFICIENCY ANEMIA: ICD-10-CM

## 2025-08-05 LAB
25(OH)D3 SERPL-MCNC: 31.1 NG/ML (ref 30–100)
BASOPHILS # BLD AUTO: 0.06 THOUSANDS/ÂΜL (ref 0–0.1)
BASOPHILS NFR BLD AUTO: 1 % (ref 0–1)
EOSINOPHIL # BLD AUTO: 0.1 THOUSAND/ÂΜL (ref 0–0.61)
EOSINOPHIL NFR BLD AUTO: 2 % (ref 0–6)
ERYTHROCYTE [DISTWIDTH] IN BLOOD BY AUTOMATED COUNT: 14.7 % (ref 11.6–15.1)
FERRITIN SERPL-MCNC: 6 NG/ML (ref 30–307)
FOLATE SERPL-MCNC: 12.7 NG/ML
HCT VFR BLD AUTO: 34 % (ref 34.8–46.1)
HGB BLD-MCNC: 10.8 G/DL (ref 11.5–15.4)
IMM GRANULOCYTES # BLD AUTO: 0.01 THOUSAND/UL (ref 0–0.2)
IMM GRANULOCYTES NFR BLD AUTO: 0 % (ref 0–2)
IRON SATN MFR SERPL: 9 % (ref 15–50)
IRON SERPL-MCNC: 39 UG/DL (ref 50–212)
LYMPHOCYTES # BLD AUTO: 1.79 THOUSANDS/ÂΜL (ref 0.6–4.47)
LYMPHOCYTES NFR BLD AUTO: 38 % (ref 14–44)
MCH RBC QN AUTO: 25.3 PG (ref 26.8–34.3)
MCHC RBC AUTO-ENTMCNC: 31.8 G/DL (ref 31.4–37.4)
MCV RBC AUTO: 80 FL (ref 82–98)
MONOCYTES # BLD AUTO: 0.46 THOUSAND/ÂΜL (ref 0.17–1.22)
MONOCYTES NFR BLD AUTO: 10 % (ref 4–12)
NEUTROPHILS # BLD AUTO: 2.28 THOUSANDS/ÂΜL (ref 1.85–7.62)
NEUTS SEG NFR BLD AUTO: 49 % (ref 43–75)
NRBC BLD AUTO-RTO: 0 /100 WBCS
PLATELET # BLD AUTO: 279 THOUSANDS/UL (ref 149–390)
PMV BLD AUTO: 12.3 FL (ref 8.9–12.7)
RBC # BLD AUTO: 4.27 MILLION/UL (ref 3.81–5.12)
TIBC SERPL-MCNC: 455 UG/DL (ref 250–450)
TRANSFERRIN SERPL-MCNC: 325 MG/DL (ref 203–362)
UIBC SERPL-MCNC: 416 UG/DL (ref 155–355)
VIT B12 SERPL-MCNC: 470 PG/ML (ref 180–914)
WBC # BLD AUTO: 4.7 THOUSAND/UL (ref 4.31–10.16)

## 2025-08-05 PROCEDURE — 85025 COMPLETE CBC W/AUTO DIFF WBC: CPT

## 2025-08-05 PROCEDURE — 83550 IRON BINDING TEST: CPT

## 2025-08-05 PROCEDURE — 82306 VITAMIN D 25 HYDROXY: CPT

## 2025-08-05 PROCEDURE — 36415 COLL VENOUS BLD VENIPUNCTURE: CPT

## 2025-08-05 PROCEDURE — 82607 VITAMIN B-12: CPT

## 2025-08-05 PROCEDURE — 82728 ASSAY OF FERRITIN: CPT

## 2025-08-05 PROCEDURE — 82746 ASSAY OF FOLIC ACID SERUM: CPT

## 2025-08-05 PROCEDURE — 83540 ASSAY OF IRON: CPT

## 2025-08-08 ENCOUNTER — TELEPHONE (OUTPATIENT)
Dept: HEMATOLOGY ONCOLOGY | Facility: CLINIC | Age: 35
End: 2025-08-08

## 2025-08-08 ENCOUNTER — OFFICE VISIT (OUTPATIENT)
Dept: HEMATOLOGY ONCOLOGY | Facility: CLINIC | Age: 35
End: 2025-08-08
Payer: COMMERCIAL

## 2025-08-08 VITALS
RESPIRATION RATE: 17 BRPM | OXYGEN SATURATION: 99 % | HEIGHT: 61 IN | WEIGHT: 169 LBS | DIASTOLIC BLOOD PRESSURE: 80 MMHG | TEMPERATURE: 98 F | HEART RATE: 97 BPM | BODY MASS INDEX: 31.91 KG/M2 | SYSTOLIC BLOOD PRESSURE: 110 MMHG

## 2025-08-08 DIAGNOSIS — K91.2 POSTSURGICAL MALABSORPTION: ICD-10-CM

## 2025-08-08 DIAGNOSIS — Z98.84 HISTORY OF GASTRIC BYPASS: ICD-10-CM

## 2025-08-08 DIAGNOSIS — D50.9 IRON DEFICIENCY ANEMIA, UNSPECIFIED IRON DEFICIENCY ANEMIA TYPE: Primary | ICD-10-CM

## 2025-08-08 PROCEDURE — 99214 OFFICE O/P EST MOD 30 MIN: CPT

## 2025-08-08 RX ORDER — SODIUM CHLORIDE 9 MG/ML
20 INJECTION, SOLUTION INTRAVENOUS ONCE
OUTPATIENT
Start: 2025-08-22

## 2025-08-13 ENCOUNTER — OFFICE VISIT (OUTPATIENT)
Dept: FAMILY MEDICINE CLINIC | Facility: CLINIC | Age: 35
End: 2025-08-13
Payer: COMMERCIAL

## 2025-08-13 PROBLEM — E53.8 VITAMIN B12 DEFICIENCY: Status: ACTIVE | Noted: 2025-08-13

## 2025-08-13 PROBLEM — E55.9 VITAMIN D INSUFFICIENCY: Status: ACTIVE | Noted: 2025-08-13

## (undated) DEVICE — ASTOUND STANDARD SURGICAL GOWN, XL: Brand: CONVERTORS

## (undated) DEVICE — DRAPE EQUIPMENT RF WAND

## (undated) DEVICE — 10 MM BABCOCKS WITH RATCHET HANDLES: Brand: ENDOPATH

## (undated) DEVICE — GLOVE SRG BIOGEL 7.5

## (undated) DEVICE — SUT MONOCRYL 0 CTX 36 IN Y398H

## (undated) DEVICE — PACK C-SECTION PBDS

## (undated) DEVICE — 3000CC GUARDIAN II: Brand: GUARDIAN

## (undated) DEVICE — SUT MONOCRYL 4-0 PS-2 27 IN Y426H

## (undated) DEVICE — TROCAR: Brand: KII FIOS FIRST ENTRY

## (undated) DEVICE — BAG DECANTER

## (undated) DEVICE — COLUMN DRAPE

## (undated) DEVICE — SUT STRATAFIX SPIRAL PDS PLUS 1 CTX 18 IN SXPP1A400

## (undated) DEVICE — URETERAL CATHETER ADAPTOR TIP

## (undated) DEVICE — SURGICEL NU-KNIT 3 X 4

## (undated) DEVICE — SUT STRATAFIX SPIRAL 4-0 PGA/PCL 30 X 30 CM SXMD2B409

## (undated) DEVICE — ADHESIVE SKIN HIGH VISCOSITY EXOFIN 1ML

## (undated) DEVICE — SEAL

## (undated) DEVICE — STAPLER 60 RELOAD BLUE: Brand: SUREFORM

## (undated) DEVICE — BLUE HEAT SCOPE WARMER

## (undated) DEVICE — VIOLET BRAIDED (POLYGLACTIN 910), SYNTHETIC ABSORBABLE SUTURE: Brand: COATED VICRYL

## (undated) DEVICE — TIP COVER ACCESSORY

## (undated) DEVICE — SUT ETHIBOND 0 CT-1 30 IN X424H

## (undated) DEVICE — STAPLER CANNULA SEAL: Brand: ENDOWRIST

## (undated) DEVICE — MONOPOLAR CURVED SCISSORS: Brand: ENDOWRIST

## (undated) DEVICE — GLOVE INDICATOR PI UNDERGLOVE SZ 7 BLUE

## (undated) DEVICE — ENDOPATH XCEL BLADELESS TROCARS WITH STABILITY SLEEVES: Brand: ENDOPATH XCEL

## (undated) DEVICE — PBT NON ABSORBABLE WOUND CLOSURE DEVICE: Brand: V-LOC

## (undated) DEVICE — ABSORBABLE WOUND CLOSURE DEVICE: Brand: V-LOC 90

## (undated) DEVICE — STAPLER 60 RELOAD WHITE: Brand: SUREFORM

## (undated) DEVICE — [HIGH FLOW INSUFFLATOR,  DO NOT USE IF PACKAGE IS DAMAGED,  KEEP DRY,  KEEP AWAY FROM SUNLIGHT,  PROTECT FROM HEAT AND RADIOACTIVE SOURCES.]: Brand: PNEUMOSURE

## (undated) DEVICE — KIT, ROBOTIC BARIATRIC: Brand: CARDINAL HEALTH

## (undated) DEVICE — VESSEL SEALER EXTEND: Brand: ENDOWRIST

## (undated) DEVICE — BLADELESS OBTURATOR: Brand: WECK VISTA

## (undated) DEVICE — VISIGI 3D®  CALIBRATION SYSTEM  SIZE 36FR BYPASS/SHORT: Brand: BOEHRINGER® VISIGI 3D®  CALIBRATION SYSTEM  SIZE 36FR BYPASS/SHORT

## (undated) DEVICE — SCD SEQUENTIAL COMPRESSION COMFORT SLEEVE MEDIUM KNEE LENGTH: Brand: KENDALL SCD

## (undated) DEVICE — Device

## (undated) DEVICE — 2000CC GUARDIAN II: Brand: GUARDIAN

## (undated) DEVICE — CANNULA SEAL

## (undated) DEVICE — ARM DRAPE

## (undated) DEVICE — ADHESIVE SKIN CLSR DERMABOND NX

## (undated) DEVICE — Device: Brand: DEFENDO AIR/WATER/SUCTION AND BIOPSY VALVE

## (undated) DEVICE — WEBRIL 6 IN UNSTERILE

## (undated) DEVICE — CHLORAPREP HI-LITE 26ML ORANGE

## (undated) DEVICE — SKIN MARKER DUAL TIP WITH RULER CAP, FLEXIBLE RULER AND LABELS: Brand: DEVON

## (undated) DEVICE — STAPLER 60 RELOAD GREEN: Brand: SUREFORM

## (undated) DEVICE — REDUCER: Brand: ENDOWRIST

## (undated) DEVICE — PLUMEPEN PRO 10FT

## (undated) DEVICE — TRAVELKIT CONTAINS FIRST STEP KIT (200ML EP-4 KIT) AND SOILED SCOPE BAG - 1 KIT: Brand: TRAVELKIT CONTAINS FIRST STEP KIT AND SOILED SCOPE BAG